# Patient Record
Sex: MALE | Race: WHITE | Employment: OTHER | URBAN - METROPOLITAN AREA
[De-identification: names, ages, dates, MRNs, and addresses within clinical notes are randomized per-mention and may not be internally consistent; named-entity substitution may affect disease eponyms.]

---

## 2017-06-12 ENCOUNTER — TELEPHONE (OUTPATIENT)
Dept: INTERNAL MEDICINE CLINIC | Age: 72
End: 2017-06-12

## 2017-06-12 DIAGNOSIS — Z01.00 EXAMINATION OF EYES AND VISION: Primary | ICD-10-CM

## 2017-06-12 NOTE — TELEPHONE ENCOUNTER
----- Message from Huhg Rossi sent at 6/12/2017  2:55 PM EDT -----  Regarding: Dr. Manolo Santillan  Pt called concerning the referral for the Optometrist and if it can be faxed [phone].  Pt best contact number [phone]

## 2017-06-12 NOTE — TELEPHONE ENCOUNTER
Pt was seen by Dr Bull Darby today 6/12/17 for annual eye exam Z01.00. [facility] [id number]    Referral obtained and faxed to Dr Farrell's office at [phone].  Auth # [id number]  47 visits  6/12/17-6/12/18

## 2017-06-13 ENCOUNTER — TELEPHONE (OUTPATIENT)
Dept: INTERNAL MEDICINE CLINIC | Age: 72
End: 2017-06-13

## 2017-06-13 DIAGNOSIS — Z12.83 SCREENING EXAM FOR SKIN CANCER: Primary | ICD-10-CM

## 2017-06-13 NOTE — TELEPHONE ENCOUNTER
Patient has an appt on 6/19/17 at 1:30pm    Maribell Miller NP  [place] [address], [address] [place] [address]  Phone: [phone]  Fax: [phone]    Patient is being seen for an annual skin check.      [employer] - GOLD [employer]US (MEDICARE REPLACEMENT/ADVANTAGE - HMO) [[id number]]  ID/Cert# [id number]

## 2017-06-14 NOTE — TELEPHONE ENCOUNTER
Referral has been Obtained & Faxed To [facility] (F) [phone] #  [id number]   No # Visits 99  Dates Covered 06/14/2017 - 06/14/2018

## 2017-06-16 ENCOUNTER — OFFICE VISIT (OUTPATIENT)
Dept: INTERNAL MEDICINE CLINIC | Age: 72
End: 2017-06-16

## 2017-06-16 VITALS
WEIGHT: 205.5 LBS | BODY MASS INDEX: 30.44 KG/M2 | HEIGHT: 69 IN | SYSTOLIC BLOOD PRESSURE: 134 MMHG | TEMPERATURE: 98.4 F | RESPIRATION RATE: 18 BRPM | OXYGEN SATURATION: 99 % | HEART RATE: 63 BPM | DIASTOLIC BLOOD PRESSURE: 85 MMHG

## 2017-06-16 DIAGNOSIS — Z11.59 ENCOUNTER FOR HEPATITIS C SCREENING TEST FOR LOW RISK PATIENT: ICD-10-CM

## 2017-06-16 DIAGNOSIS — E78.5 HYPERLIPIDEMIA LDL GOAL <100: ICD-10-CM

## 2017-06-16 DIAGNOSIS — R73.9 ELEVATED BLOOD SUGAR: Primary | ICD-10-CM

## 2017-06-16 DIAGNOSIS — K21.9 GASTROESOPHAGEAL REFLUX DISEASE WITHOUT ESOPHAGITIS: ICD-10-CM

## 2017-06-16 LAB — HBA1C MFR BLD HPLC: 5.7 %

## 2017-06-16 NOTE — MR AVS SNAPSHOT
Visit Information Date & Time Provider Department Dept. Phone Encounter #  
 6/16/2017  1:00 PM Wilfrido Tamayo MD [facility] [address]  [id number] Follow-up Instructions Return in about 6 months (around 12/16/2017). Your Appointments 6/19/2017  1:30 PM  
Office Visit with CHANEL Mcdonald [address] [facility]) Appt Note: EST PT; 1 yr skin exam ; H/O:SCC; 06/16/2016: Pt insurance will be changing (Medicare) [address] [facility] [address] [facility] [address]  
[address] [place] [address] [place]  [facility] [place] [address] Upcoming Health Maintenance Date Due Hepatitis C Screening 1945 DTaP/Tdap/Td series (1 - Tdap) 5/6/1968 ZOSTER VACCINE AGE 60> 5/6/2007 GLAUCOMA SCREENING Q2Y 5/6/2012 Pneumococcal 65+ Low/Medium Risk (2 of 2 - PCV13) 9/26/2014 INFLUENZA AGE 9 TO ADULT 8/1/2017 MEDICARE YEARLY EXAM 12/17/2017 COLONOSCOPY 8/8/2024 Allergies as of 6/16/2017  Review Complete On: 6/16/2017 By: Edgar Faust LPN No Known Allergies Current Immunizations  Reviewed on 5/21/2015 Name Date Influenza High Dose Vaccine PF 10/1/2016 Influenza Vaccine 10/14/2015, 10/17/2014, 9/12/2013 Pneumococcal Polysaccharide (PPSV-23) 9/26/2013 Not reviewed this visit You Were Diagnosed With   
  
 Codes Comments Elevated blood sugar    -  Primary ICD-10-CM: R73.9 ICD-9-CM: 790.29 Encounter for hepatitis C screening test for low risk patient     ICD-10-CM: Z11.59 
ICD-9-CM: V73.89 Gastroesophageal reflux disease without esophagitis     ICD-10-CM: K21.9 ICD-9-CM: 530.81 Hyperlipidemia LDL goal <100     ICD-10-CM: E78.5 ICD-9-CM: 272.4 Vitals BP Pulse Temp Resp Height(growth percentile) Weight(growth percentile)  134/85 (BP 1 Location: Left arm, BP Patient Position: Sitting) 63 98.4 °F (36.9 °C) (Oral) 18 5' 8.75\" (1.746 m) 205 lb 8 oz (93.2 kg) SpO2 BMI Smoking Status 99% 30.57 kg/m2 Former Smoker BMI and BSA Data Body Mass Index Body Surface Area 30.57 kg/m 2 2.13 m 2 Preferred Pharmacy Pharmacy Name Phone [facility], [address] [phone] Your Updated Medication List  
  
   
This list is accurate as of: 6/16/17  1:29 PM.  Always use your most recent med list.  
  
  
  
  
 ASPIR-81 PO Take  by mouth. FISH  mg Cap Generic drug:  Omega-3 Fatty Acids Take  by mouth. One per day MULTIPLE VITAMIN PO Take  by mouth. omeprazole 20 mg capsule Commonly known as:  PRILOSEC  
TAKE ONE CAPSULE BY MOUTH DAILY psyllium husk (with sugar) 3.4 gram Pwpk Take  by mouth nightly. VITAMIN B-12 1,000 mcg tablet Generic drug:  cyanocobalamin Take 1,000 mcg by mouth daily. VITAMIN C 500 mg tablet Generic drug:  ascorbic acid (vitamin C) Take  by mouth. We Performed the Following AMB POC HEMOGLOBIN A1C [79223 CPT(R)] HEPATITIS C AB [41279 CPT(R)] LIPID PANEL [78141 CPT(R)] Follow-up Instructions Return in about 6 months (around 12/16/2017). Introducing [facility]! Dear Antonella Pretty: Thank you for requesting a [employer] account. Our records indicate that you already have an active Nomios account. You can access your account anytime at [email or web address] Did you know that you can access your hospital and ER discharge instructions at any time in Nomios? You can also review all of your test results from your hospital stay or ER visit. Additional Information If you have questions, please visit the Frequently Asked Questions section of the Nom[employer] website at [email or web address] Remember, Nomios is NOT to be used for urgent needs.  For medical emergencies, dial 911. Now available from your iPhone and Android! Please provide this summary of care documentation to your next provider. Your primary care clinician is listed as Scott Brown. If you have any questions after today's visit, please call [phone].

## 2017-06-16 NOTE — PROGRESS NOTES
HISTORY OF PRESENT ILLNESS  Holly Gallagher is a 81 y.o. male. HPI  Prediabetes:  Holly Gallagher is here for follow up of elevated fasting sugars and prediabetes. he has been counseled before on low carb/ low concentrated sweets diet and is following that plan. further diabetic ROS: no polyuria or polydipsia, no chest pain, dyspnea or TIAs . Lab Results   Component Value Date/Time    Glucose 151 09/03/2016 07:00 PM    Glucose (POC) 87 10/12/2010 11:55 AM     Lab Results   Component Value Date/Time    Hemoglobin A1c 6.1 12/19/2016 10:21 AM    Hemoglobin A1c (POC) 5.7 06/16/2017 01:00 AM    Hemoglobin A1c, External 5.9 05/18/2015     Last Point of Care HGB A1C  Hemoglobin A1c (POC)   Date Value Ref Range Status   06/16/2017 5.7 % Final      Hyperlipidemia:  Holly Gallagher is following up on his dyslipidemia. Cardiovascular risks for him are: LDL goal is under 100. Currently he takes  , nothing now  Lab Results   Component Value Date/Time    Cholesterol, total 173 12/22/2015 10:31 AM    Cholesterol, total 165 12/09/2014 11:18 AM    Cholesterol, total 152 09/13/2013 09:45 AM    HDL Cholesterol 67 12/22/2015 10:31 AM    HDL Cholesterol 71 12/09/2014 11:18 AM    HDL Cholesterol 61 09/13/2013 09:45 AM    LDL, calculated 85 12/22/2015 10:31 AM    LDL, calculated 80 12/09/2014 11:18 AM    LDL, calculated 80 09/13/2013 09:45 AM    Triglyceride 105 12/22/2015 10:31 AM    Triglyceride 71 12/09/2014 11:18 AM    Triglyceride 55 09/13/2013 09:45 AM     Lab Results   Component Value Date/Time    ALT (SGPT) 29 09/03/2016 07:00 PM    AST (SGOT) 21 09/03/2016 07:00 PM    Alk. phosphatase 64 09/03/2016 07:00 PM    Bilirubin, total 0.3 09/03/2016 07:00 PM               GERD - well controlled with prilosec. Quick recurrance if misses PPI. ROS    Physical Exam   Constitutional: He appears well-developed and well-nourished. No distress.    /85 (BP 1 Location: Left arm, BP Patient Position: Sitting)  Pulse 63  Temp 98.4 °F (36.9 °C) (Oral)   Resp 18  Ht 5' 8.75\" (1.746 m)  Wt 205 lb 8 oz (93.2 kg)  SpO2 99%  BMI 30.57 kg/m2Body mass index is 30.57 kg/(m^2). HENT:   Mouth/Throat: Oropharynx is clear and moist.   Neck: No JVD present. Carotid bruit is not present. Cardiovascular: Normal rate, regular rhythm, normal heart sounds and intact distal pulses. Pulmonary/Chest: Effort normal and breath sounds normal.   Musculoskeletal: He exhibits no edema. Neurological: He is alert. Gait normal.   Bilateral LE muscle atrophy as before. Skin: Skin is warm and dry. He is not diaphoretic. Nursing note and vitals reviewed. ASSESSMENT and PLAN  Tomma Payment was seen today for gerd. Diagnoses and all orders for this visit:    Elevated blood sugar -Well controlled and stable. his medications were reviewed and refilled where necessary as noted below. Labs ordered as noted. -     AMB POC HEMOGLOBIN A1C    Encounter for hepatitis C screening test for low risk patient  -     HEPATITIS C AB    Gastroesophageal reflux disease without esophagitis -Well controlled and stable. his medications were reviewed and refilled where necessary as noted below. Labs ordered as noted. Hyperlipidemia LDL goal <100 -recheck off statin. -     LIPID PANEL      Follow-up Disposition:  Return in about 6 months (around 12/16/2017).

## 2017-06-19 ENCOUNTER — OFFICE VISIT (OUTPATIENT)
Dept: DERMATOLOGY | Facility: AMBULATORY SURGERY CENTER | Age: 72
End: 2017-06-19

## 2017-06-19 VITALS
DIASTOLIC BLOOD PRESSURE: 90 MMHG | BODY MASS INDEX: 29.62 KG/M2 | SYSTOLIC BLOOD PRESSURE: 130 MMHG | HEART RATE: 66 BPM | TEMPERATURE: 98.6 F | OXYGEN SATURATION: 98 % | HEIGHT: 69 IN | WEIGHT: 200 LBS

## 2017-06-19 DIAGNOSIS — L73.1 INGROWN HAIR: Primary | ICD-10-CM

## 2017-06-19 DIAGNOSIS — L90.5 SCAR CONDITION AND FIBROSIS OF SKIN: ICD-10-CM

## 2017-06-19 DIAGNOSIS — Z85.828 HISTORY OF NONMELANOMA SKIN CANCER: ICD-10-CM

## 2017-06-19 DIAGNOSIS — L82.1 SEBORRHEIC KERATOSES: ICD-10-CM

## 2017-06-19 DIAGNOSIS — D22.9 MULTIPLE BENIGN NEVI: ICD-10-CM

## 2017-06-19 DIAGNOSIS — D18.01 CHERRY ANGIOMA: ICD-10-CM

## 2017-06-19 DIAGNOSIS — L81.4 LENTIGINES: ICD-10-CM

## 2017-06-19 NOTE — PROGRESS NOTES
Name: Ligia Cormier       Age: 81 y.o. Date: 6/19/2017    Chief Complaint:   Chief Complaint   Patient presents with    Skin Exam       Subjective:    HPI  Mr. Ligia Cormier is a 81 y.o. male who presents for a skin exam.  The patient's last skin exam was one year ago and the patient does have current complaints related to his skin. He reports a new bump on the right jawline. This is been present only for a week or so with inflammation. He did try to scratch at it and see if this would open the area up without much success and resolution. Mr. Ligia Cormier is feeling well and in his usual state of health today. The patient's pertinent skin history includes : Sun exposure through recreation including golf. 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  He reports he does wear sunscreen when outdoors. ROS: Constitutional: Negative. Dermatological : positive for - skin lesion changes      Social History     Social History    Marital status:      Spouse name: N/A    Number of children: N/A    Years of education: N/A     Occupational History    Not on file.      Social History Main Topics    Smoking status: Former Smoker     Types: Cigars     Quit date: 11/12/1997    Smokeless tobacco: Never Used    Alcohol use 6.0 oz/week     12 Glasses of wine per week    Drug use: No    Sexual activity: Not Currently     Partners: Female     Other Topics Concern    Not on file     Social History Narrative       Family History   Problem Relation Age of Onset    Heart Disease Mother     Diabetes Maternal Uncle     Dementia Maternal Grandmother     Diabetes Other     Cancer Neg Hx     Hypertension Neg Hx        Past Medical History:   Diagnosis Date    Arthritis     cervical spine    Calculus of kidney     GERD (gastroesophageal reflux disease)     Hypercholesterolemia     Memory change     Peripheral sensory-motor axonal polyneuropathy     Skin cancer     SCC on chest    Sun-damaged skin     Sunburn, blistering     Tinnitus        Past Surgical History:   Procedure Laterality Date    HX ORTHOPAEDIC      R grt toe surgery       Current Outpatient Prescriptions   Medication Sig Dispense Refill    omeprazole (PRILOSEC) 20 mg capsule TAKE ONE CAPSULE BY MOUTH DAILY 90 Cap 2    cyanocobalamin (VITAMIN B-12) 1,000 mcg tablet Take 1,000 mcg by mouth daily.  psyllium husk, with sugar, 3.4 gram pwpk Take  by mouth nightly.  ascorbic acid (VITAMIN C) 500 mg tablet Take  by mouth.  Omega-3 Fatty Acids (FISH OIL) 300 mg cap Take  by mouth. One per day      ASPIRIN (ASPIR-81 PO) Take  by mouth.  MULTIVITAMIN (MULTIPLE VITAMIN PO) Take  by mouth. No Known Allergies      Objective:    Visit Vitals    /90 (BP 1 Location: Left arm, BP Patient Position: Sitting)    Pulse 66    Temp 98.6 °F (37 °C) (Oral)    Ht 5' 8.75\" (1.746 m)    Wt 90.7 kg (200 lb)    SpO2 98%    BMI 29.75 kg/m2       Misael Patton is a 81 y.o. male who appears well and in no distress. He is awake, alert, and oriented. There is no preauricular, submandibular, or cervical lymphadenopathy. A skin examination was performed including his scalp, face (including eyelids), ears, neck, chest, back, abdomen, upper extremities (including digits/nails), breasts. He is scattered waxy macules and keratotic papules consistent with seborrheic keratosis. There are scattered cherry angiomas. He has lentigines across his upper back and shoulders. There is a well-healed scar on the left chest without evidence of lesion recurrence. He does have an entrapped hair on the right jawline creating an inflammatory papule. There are medium brown junctional nevi without concerning features. Assessment/Plan:  1. Seborrheic keratoses. The diagnosis was reviewed and the patient was reassured that no treatment is needed for these benign lesions. 2. Personal history of skin cancer.   I discussed sun protection, sunscreen use, the warning signs of skin cancer, the need for self-skin examinations, and the need for regular practitioner exams every 1 year. The patient should follow up sooner as needed if new, changing, or symptomatic skin lesions arise. 3.Cherry angiomas. The diagnosis was reviewed and the patient was reassured that no treatment is needed for these benign lesions. 4.Normal nevi. The diagnosis of normal nevi was reviewed. I discussed sun protection, sunscreen use, the warning signs of skin cancer, the need for self-skin examinations, and the need for regular practitioner exams every 1 year. The patient should follow up sooner as needed if new, changing, or symptomatic skin lesions arise. 5. Solar lentigos. The diagnosis and relationship to sun exposure was reviewed. Sun protection advised. 6. Entrapped hair. This was removed with an 11 blade successfully.

## 2017-06-24 LAB
CHOLEST SERPL-MCNC: 207 MG/DL (ref 100–199)
HCV AB S/CO SERPL IA: <0.1 S/CO RATIO (ref 0–0.9)
HDLC SERPL-MCNC: 55 MG/DL
INTERPRETATION, 910389: NORMAL
LDLC SERPL CALC-MCNC: 135 MG/DL (ref 0–99)
TRIGL SERPL-MCNC: 86 MG/DL (ref 0–149)
VLDLC SERPL CALC-MCNC: 17 MG/DL (ref 5–40)

## 2017-12-15 ENCOUNTER — OFFICE VISIT (OUTPATIENT)
Dept: INTERNAL MEDICINE CLINIC | Age: 72
End: 2017-12-15

## 2017-12-15 VITALS
HEIGHT: 69 IN | OXYGEN SATURATION: 97 % | TEMPERATURE: 98.6 F | HEART RATE: 62 BPM | RESPIRATION RATE: 18 BRPM | SYSTOLIC BLOOD PRESSURE: 156 MMHG | WEIGHT: 210.25 LBS | BODY MASS INDEX: 31.14 KG/M2 | DIASTOLIC BLOOD PRESSURE: 96 MMHG

## 2017-12-15 DIAGNOSIS — Z23 ENCOUNTER FOR IMMUNIZATION: ICD-10-CM

## 2017-12-15 DIAGNOSIS — Z71.89 ADVANCE CARE PLANNING: ICD-10-CM

## 2017-12-15 DIAGNOSIS — I10 ESSENTIAL HYPERTENSION: ICD-10-CM

## 2017-12-15 DIAGNOSIS — Z00.00 MEDICARE ANNUAL WELLNESS VISIT, SUBSEQUENT: Primary | ICD-10-CM

## 2017-12-15 DIAGNOSIS — R73.09 ELEVATED GLUCOSE: ICD-10-CM

## 2017-12-15 NOTE — ACP (ADVANCE CARE PLANNING)
I discussed importance of advanced care plans with Ranulfo Barrientos. he does not have an updated advanced care plan documented in this chart (and if not in chart, he will provide updated advanced medical directive or develop and provide AMD to be scanned). AMD literature provided to patient if needed.

## 2017-12-15 NOTE — ACP (ADVANCE CARE PLANNING)
I discussed importance of advanced care plans with Chantelle Ramey. he does not have an updated advanced care plan documented in this chart (and if not in chart, he will provide updated advanced medical directive or develop and provide AMD to be scanned). AMD literature provided to patient if needed.

## 2017-12-15 NOTE — PATIENT INSTRUCTIONS
High Blood Pressure: Care Instructions  Your Care Instructions    If your blood pressure is usually above 140/90, you have high blood pressure, or hypertension. That means the top number is 140 or higher or the bottom number is 90 or higher, or both. Despite what a lot of people think, high blood pressure usually doesn't cause headaches or make you feel dizzy or lightheaded. It usually has no symptoms. But it does increase your risk for heart attack, stroke, and kidney or eye damage. The higher your blood pressure, the more your risk increases. Your doctor will give you a goal for your blood pressure. Your goal will be based on your health and your age. An example of a goal is to keep your blood pressure below 140/90. Lifestyle changes, such as eating healthy and being active, are always important to help lower blood pressure. You might also take medicine to reach your blood pressure goal.  Follow-up care is a key part of your treatment and safety. Be sure to make and go to all appointments, and call your doctor if you are having problems. It's also a good idea to know your test results and keep a list of the medicines you take. How can you care for yourself at home? Medical treatment  · If you stop taking your medicine, your blood pressure will go back up. You may take one or more types of medicine to lower your blood pressure. Be safe with medicines. Take your medicine exactly as prescribed. Call your doctor if you think you are having a problem with your medicine. · Talk to your doctor before you start taking aspirin every day. Aspirin can help certain people lower their risk of a heart attack or stroke. But taking aspirin isn't right for everyone, because it can cause serious bleeding. · See your doctor regularly. You may need to see the doctor more often at first or until your blood pressure comes down.   · If you are taking blood pressure medicine, talk to your doctor before you take decongestants or anti-inflammatory medicine, such as ibuprofen. Some of these medicines can raise blood pressure. · Learn how to check your blood pressure at home. Lifestyle changes  · Stay at a healthy weight. This is especially important if you put on weight around the waist. Losing even 10 pounds can help you lower your blood pressure. · If your doctor recommends it, get more exercise. Walking is a good choice. Bit by bit, increase the amount you walk every day. Try for at least 30 minutes on most days of the week. You also may want to swim, bike, or do other activities. · Avoid or limit alcohol. Talk to your doctor about whether you can drink any alcohol. · Try to limit how much sodium you eat to less than 2,300 milligrams (mg) a day. Your doctor may ask you to try to eat less than 1,500 mg a day. · Eat plenty of fruits (such as bananas and oranges), vegetables, legumes, whole grains, and low-fat dairy products. · Lower the amount of saturated fat in your diet. Saturated fat is found in animal products such as milk, cheese, and meat. Limiting these foods may help you lose weight and also lower your risk for heart disease. · Do not smoke. Smoking increases your risk for heart attack and stroke. If you need help quitting, talk to your doctor about stop-smoking programs and medicines. These can increase your chances of quitting for good. When should you call for help? Call 911 anytime you think you may need emergency care. This may mean having symptoms that suggest that your blood pressure is causing a serious heart or blood vessel problem. Your blood pressure may be over 180/110. ? For example, call 911 if:  ? · You have symptoms of a heart attack. These may include:  ¨ Chest pain or pressure, or a strange feeling in the chest.  ¨ Sweating. ¨ Shortness of breath. ¨ Nausea or vomiting.   ¨ Pain, pressure, or a strange feeling in the back, neck, jaw, or upper belly or in one or both shoulders or arms.  ¨ Lightheadedness or sudden weakness. ¨ A fast or irregular heartbeat. ? · You have symptoms of a stroke. These may include:  ¨ Sudden numbness, tingling, weakness, or loss of movement in your face, arm, or leg, especially on only one side of your body. ¨ Sudden vision changes. ¨ Sudden trouble speaking. ¨ Sudden confusion or trouble understanding simple statements. ¨ Sudden problems with walking or balance. ¨ A sudden, severe headache that is different from past headaches. ? · You have severe back or belly pain. ?Do not wait until your blood pressure comes down on its own. Get help right away. ?Call your doctor now or seek immediate care if:  ? · Your blood pressure is much higher than normal (such as 180/110 or higher), but you don't have symptoms. ? · You think high blood pressure is causing symptoms, such as:  ¨ Severe headache. ¨ Blurry vision. ? Watch closely for changes in your health, and be sure to contact your doctor if:  ? · Your blood pressure measures 140/90 or higher at least 2 times. That means the top number is 140 or higher or the bottom number is 90 or higher, or both. ? · You think you may be having side effects from your blood pressure medicine. ? · Your blood pressure is usually normal, but it goes above normal at least 2 times. Where can you learn more? Go to http://suzanne-justin.info/. Enter T439 in the search box to learn more about \"High Blood Pressure: Care Instructions. \"  Current as of: September 21, 2016  Content Version: 11.4  © 5376-8543 [employer]. Care instructions adapted under license by [employer] (which disclaims liability or warranty for this information). If you have questions about a medical condition or this instruction, always ask your healthcare professional. [place] [address] any warranty or liability for your use of this information.        DASH Diet: Care Instructions  Your Care Instructions    The DASH diet is an eating plan that can help lower your blood pressure. DASH stands for Dietary Approaches to Stop Hypertension. Hypertension is high blood pressure. The DASH diet focuses on eating foods that are high in calcium, potassium, and magnesium. These nutrients can lower blood pressure. The foods that are highest in these nutrients are fruits, vegetables, low-fat dairy products, nuts, seeds, and legumes. But taking calcium, potassium, and magnesium supplements instead of eating foods that are high in those nutrients does not have the same effect. The DASH diet also includes whole grains, fish, and poultry. The DASH diet is one of several lifestyle changes your doctor may recommend to lower your high blood pressure. Your doctor may also want you to decrease the amount of sodium in your diet. Lowering sodium while following the DASH diet can lower blood pressure even further than just the DASH diet alone. Follow-up care is a key part of your treatment and safety. Be sure to make and go to all appointments, and call your doctor if you are having problems. It's also a good idea to know your test results and keep a list of the medicines you take. How can you care for yourself at home? Following the DASH diet  · Eat 4 to 5 servings of fruit each day. A serving is 1 medium-sized piece of fruit, ½ cup chopped or canned fruit, 1/4 cup dried fruit, or 4 ounces (½ cup) of fruit juice. Choose fruit more often than fruit juice. · Eat 4 to 5 servings of vegetables each day. A serving is 1 cup of lettuce or raw leafy vegetables, ½ cup of chopped or cooked vegetables, or 4 ounces (½ cup) of vegetable juice. Choose vegetables more often than vegetable juice. · Get 2 to 3 servings of low-fat and fat-free dairy each day. A serving is 8 ounces of milk, 1 cup of yogurt, or 1 ½ ounces of cheese. · Eat 6 to 8 servings of grains each day.  A serving is 1 slice of bread, 1 ounce of dry cereal, or ½ cup of cooked rice, pasta, or cooked cereal. Try to choose whole-grain products as much as possible. · Limit lean meat, poultry, and fish to 2 servings each day. A serving is 3 ounces, about the size of a deck of cards. · Eat 4 to 5 servings of nuts, seeds, and legumes (cooked dried beans, lentils, and split peas) each week. A serving is 1/3 cup of nuts, 2 tablespoons of seeds, or ½ cup of cooked beans or peas. · Limit fats and oils to 2 to 3 servings each day. A serving is 1 teaspoon of vegetable oil or 2 tablespoons of salad dressing. · Limit sweets and added sugars to 5 servings or less a week. A serving is 1 tablespoon jelly or jam, ½ cup sorbet, or 1 cup of lemonade. · Eat less than 2,300 milligrams (mg) of sodium a day. If you limit your sodium to 1,500 mg a day, you can lower your blood pressure even more. Tips for success  · Start small. Do not try to make dramatic changes to your diet all at once. You might feel that you are missing out on your favorite foods and then be more likely to not follow the plan. Make small changes, and stick with them. Once those changes become habit, add a few more changes. · Try some of the following:  ¨ Make it a goal to eat a fruit or vegetable at every meal and at snacks. This will make it easy to get the recommended amount of fruits and vegetables each day. ¨ Try yogurt topped with fruit and nuts for a snack or healthy dessert. ¨ Add lettuce, tomato, cucumber, and onion to sandwiches. ¨ Combine a ready-made pizza crust with low-fat mozzarella cheese and lots of vegetable toppings. Try using tomatoes, squash, spinach, broccoli, carrots, cauliflower, and onions. ¨ Have a variety of cut-up vegetables with a low-fat dip as an appetizer instead of chips and dip. ¨ Sprinkle sunflower seeds or chopped almonds over salads. Or try adding chopped walnuts or almonds to cooked vegetables. ¨ Try some vegetarian meals using beans and peas. Add garbanzo or kidney beans to salads. Make burritos and tacos with mashed ruth beans or black beans. Where can you learn more? Go to http://suzanne-justin.info/. Enter T924 in the search box to learn more about \"DASH Diet: Care Instructions. \"  Current as of: September 21, 2016  Content Version: 11.4  © 6919-3428 [employer]. Care instructions adapted under license by [employer] (which disclaims liability or warranty for this information). If you have questions about a medical condition or this instruction, always ask your healthcare professional. [place] [address] any warranty or liability for your use of this information. Well Visit, Over 72: Care Instructions  Your Care Instructions    Physical exams can help you stay healthy. Your doctor has checked your overall health and may have suggested ways to take good care of yourself. He or she also may have recommended tests. At home, you can help prevent illness with healthy eating, regular exercise, and other steps. Follow-up care is a key part of your treatment and safety. Be sure to make and go to all appointments, and call your doctor if you are having problems. It's also a good idea to know your test results and keep a list of the medicines you take. How can you care for yourself at home? · Reach and stay at a healthy weight. This will lower your risk for many problems, such as obesity, diabetes, heart disease, and high blood pressure. · Get at least 30 minutes of exercise on most days of the week. Walking is a good choice. You also may want to do other activities, such as running, swimming, cycling, or playing tennis or team sports. · Do not smoke. Smoking can make health problems worse. If you need help quitting, talk to your doctor about stop-smoking programs and medicines. These can increase your chances of quitting for good. · Protect your skin from too much sun.  When you're outdoors from 10 a.m. to 4 p.m., stay in the shade or cover up with clothing and a hat with a wide brim. Wear sunglasses that block UV rays. Even when it's cloudy, put broad-spectrum sunscreen (SPF 30 or higher) on any exposed skin. · See a dentist one or two times a year for checkups and to have your teeth cleaned. · Wear a seat belt in the car. · Limit alcohol to 2 drinks a day for men and 1 drink a day for women. Too much alcohol can cause health problems. Follow your doctor's advice about when to have certain tests. These tests can spot problems early. For men and women  · Cholesterol. Your doctor will tell you how often to have this done based on your overall health and other things that can increase your risk for heart attack and stroke. · Blood pressure. Have your blood pressure checked during a routine doctor visit. Your doctor will tell you how often to check your blood pressure based on your age, your blood pressure results, and other factors. · Diabetes. Ask your doctor whether you should have tests for diabetes. · Vision. Experts recommend that you have yearly exams for glaucoma and other age-related eye problems. · Hearing. Tell your doctor if you notice any change in your hearing. You can have tests to find out how well you hear. · Colon cancer tests. Keep having colon cancer tests as your doctor recommends. You can have one of several types of tests. · Heart attack and stroke risk. At least every 4 to 6 years, you should have your risk for heart attack and stroke assessed. Your doctor uses factors such as your age, blood pressure, cholesterol, and whether you smoke or have diabetes to show what your risk for a heart attack or stroke is over the next 10 years. · Osteoporosis. Talk to your doctor about whether you should have a bone density test to find out whether you have thinning bones. Also ask your doctor about whether you should take calcium and vitamin D supplements.   For women  · Pap test and pelvic exam. You may no longer need a Pap test. Talk with your doctor about whether to stop or continue to have Pap tests. · Breast exam and mammogram. Ask how often you should have a mammogram, which is an X-ray of your breasts. A mammogram can spot breast cancer before it can be felt and when it is easiest to treat. · Thyroid disease. Talk to your doctor about whether to have your thyroid checked as part of a regular physical exam. Women have an increased chance of a thyroid problem. For men  · Prostate exam. Talk to your doctor about whether you should have a blood test (called a PSA test) for prostate cancer. Experts disagree on whether men should have this test. Some experts recommend that you discuss the benefits and risks of the test with your doctor. · Abdominal aortic aneurysm. Ask your doctor whether you should have a test to check for an aneurysm. You may need a test if you ever smoked or if your parent, brother, sister, or child has had an aneurysm. When should you call for help? Watch closely for changes in your health, and be sure to contact your doctor if you have any problems or symptoms that concern you. Where can you learn more? Go to http://suzanne-justin.info/. Enter T501 in the search box to learn more about \"Well Visit, Over 65: Care Instructions. \"  Current as of: May 12, 2017  Content Version: 11.4  © 7783-7658 [employer]. Care instructions adapted under license by [employer] (which disclaims liability or warranty for this information). If you have questions about a medical condition or this instruction, always ask your healthcare professional. [place] [address] any warranty or liability for your use of this information.

## 2017-12-15 NOTE — PROGRESS NOTES
HISTORY OF PRESENT ILLNESS  Kayla Reza is a 81 y.o. male. HPI  This is a Subsequent Medicare Annual Wellness Visit providing Personalized Prevention Plan Services (PPPS) (Performed 12 months after initial AWV and PPPS )    Patient Active Problem List   Diagnosis Code    GERD (gastroesophageal reflux disease) K21.9    Kidney stones N20.0    Squamous cell skin cancer C44.92    Prediabetes R73.03    Fracture, cervical vertebra (HCC) S12. 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 Social History Main Topics    Smoking status: Former Smoker     Types: Cigars     Quit date: 11/12/1997    Smokeless tobacco: Never Used    Alcohol use 6.0 oz/week     12 Glasses of wine per week    Drug use: No    Sexual activity: Not Currently     Partners: Female     Other Topics Concern    Not on file     Social History Narrative     Family History   Problem Relation Age of Onset    Heart Disease Mother     Diabetes Maternal Uncle     Dementia Maternal Grandmother     Diabetes Other     Cancer Neg Hx     Hypertension Neg Hx      Current Outpatient Prescriptions   Medication Sig    omeprazole (PRILOSEC) 20 mg capsule TAKE ONE CAPSULE BY MOUTH DAILY    cyanocobalamin (VITAMIN B-12) 1,000 mcg tablet Take 1,000 mcg by mouth daily.  psyllium husk, with sugar, 3.4 gram pwpk Take  by mouth nightly.     Omega-3 Fatty Acids (FISH OIL) 300 mg cap Take by mouth. One per day    ASPIRIN (ASPIR-81 PO) Take  by mouth.  MULTIVITAMIN (MULTIPLE VITAMIN PO) Take  by mouth.  ascorbic acid (VITAMIN C) 500 mg tablet Take  by mouth. No current facility-administered medications for this visit. No Known Allergies  Immunization History   Administered Date(s) Administered    Influenza High Dose Vaccine PF 10/01/2016, 10/18/2017    Influenza Vaccine 09/12/2013, 10/17/2014, 10/14/2015    Pneumococcal Polysaccharide (PPSV-23) 09/26/2013       Depression scale assessment:  PHQ over the last two weeks 12/15/2017   Little interest or pleasure in doing things Not at all   Feeling down, depressed or hopeless Not at all   Total Score PHQ 2 0       Alcohol screening assessment  Social History   Substance Use Topics    Smoking status: Former Smoker     Types: Cigars     Quit date: 11/12/1997    Smokeless tobacco: Never Used    Alcohol use 6.0 oz/week     12 Glasses of wine per week            Functional assessment/ safety  Hearing Loss   Mild hearing loss      Activities of Daily Living   Self-care. Requires assistance with: no ADLs    Fall Risk   Fall Risk Assessment, last 12 mths 12/15/2017   Able to walk? Yes   Fall in past 12 months? No   Fall with injury? -   Number of falls in past 12 months -   Fall Risk Score -       Abuse Risk:  Patient is not abused    Advanced Medical Directive screening:  Blanquita Barker does not have an AMD on file in this chart. ( If not, He will provide for us to copy to chart or he has been counseled on the need for this to assist with decision making should they be incapacitated due to illness and the steps necessary to draw up this document.)    Health maintenance hx includes:  Exercise: moderately active.   Form of exercise: working as bagger at [employer]  Diet: generally follows a low fat low cholesterol diet, exercises sporadically    Cancer screening:    Colon cancer screening:  Last Colonoscopy: 2014 and was normal     Men only: PSA testing 2016 and was within normal limits       ROS    Physical Exam   Constitutional: He is oriented to person, place, and time. He appears well-developed and well-nourished. No distress. Body mass index is 31.5 kg/(m^2). BP (!) 156/96 (BP 1 Location: Left arm, BP Patient Position: Sitting)  Pulse 62  Temp 98.6 °F (37 °C) (Oral)   Resp 18  Ht 5' 8.5\" (1.74 m)  Wt 210 lb 4 oz (95.4 kg)  SpO2 97%  BMI 31.5 kg/m2   HENT:   Head: Normocephalic and atraumatic. Right Ear: Hearing normal.   Left Ear: Hearing normal.   Nose: Nose normal.   Mouth/Throat: Oropharynx is clear and moist and mucous membranes are normal. Normal dentition. Eyes: Conjunctivae and lids are normal. Pupils are equal, round, and reactive to light. Right eye exhibits no discharge. Left eye exhibits no discharge. No scleral icterus. Neck: Trachea normal. No thyromegaly present. Cardiovascular: Normal rate, regular rhythm, normal heart sounds, intact distal pulses and normal pulses. Exam reveals no gallop and no friction rub. No murmur heard. Pulmonary/Chest: Effort normal and breath sounds normal. No respiratory distress. Abdominal: Soft. Normal appearance and bowel sounds are normal. He exhibits no distension and no mass. There is no hepatosplenomegaly. There is no tenderness. There is no CVA tenderness. Musculoskeletal: Normal range of motion. He exhibits no edema or tenderness. Lymphadenopathy:     He has no cervical adenopathy. Neurological: He is alert and oriented to person, place, and time. He displays atrophy (LE's) and tremor (trace hand tremors). He exhibits normal muscle tone. Coordination and gait normal.   Skin: Skin is warm and dry. No rash noted. He is not diaphoretic. Psychiatric: He has a normal mood and affect. His speech is normal and behavior is normal. Judgment and thought content normal. Cognition and memory are normal.   Nursing note and vitals reviewed.       ASSESSMENT and PLAN  Diagnoses and all orders for this visit:    1. Medicare annual wellness visit, subsequent  -     pneumococcal 13 elizabeth conj dip (PREVNAR-13) 0.5 mL syrg injection; 0.5 mL by IntraMUSCular route once for 1 dose. he was advised to have follow up colonoscopy in 2024  Bipin Moreland was counseled on age-appropriate/ guideline-based risk prevention behaviors and screening for a 81y.o. year old   male . We also discussed adjustments in screening based on family history if necessary. Printed instructions for preventative screening guidelines and healthy behaviors given to patient with after visit summary. 2. Elevated glucose - recheck. Low carb diet  -     HEMOGLOBIN A1C WITH EAG    3. Encounter for immunization  -     pneumococcal 13 elizabeth conj dip (PREVNAR-13) 0.5 mL syrg injection; 0.5 mL by IntraMUSCular route once for 1 dose. 4. Essential hypertension - given DASH diet and HTN instructions. The patient is advised to begin progressive daily aerobic exercise program, follow a low fat, low cholesterol diet, attempt to lose weight and reduce salt in diet and cooking. Log blood pressures at home while sitting, relaxed 3-5 times weekly and bring to next visit. Pt educated on goal BP of 130/80 on average or lower. Call office as soon as possible if BP's over 140/90 or below 110/50 on multiple occasions and/or with symptoms of dizziness, chest pain, shortness of breath, headache or ankle swelling. Recheck log and bp here in 3 month(s).    -     METABOLIC PANEL, BASIC      Follow-up Disposition:  Return in about 3 months (around 3/15/2018).

## 2017-12-15 NOTE — MR AVS SNAPSHOT
Visit Information Date & Time Provider Department Dept. Phone Encounter #  
 12/15/2017  1:30 PM Rock Coe MD [facility] [address] [id number] Follow-up Instructions Return in about 3 months (around 3/15/2018). Your Appointments 6/19/2018  2:00 PM  
Office Visit with Lynne Lara NP [facility]) Appt Note: yearly skin exam  
 [facility] [address]  
[address] [place] [id number] Upcoming Health Maintenance Date Due DTaP/Tdap/Td series (1 - Tdap) 5/6/1968 ZOSTER VACCINE AGE 60> 3/6/2007 Pneumococcal 65+ Low/Medium Risk (2 of 2 - PCV13) 9/26/2014 Influenza Age 5 to Adult 8/1/2017 MEDICARE YEARLY EXAM 12/17/2017 GLAUCOMA SCREENING Q2Y 6/12/2019 COLONOSCOPY 8/8/2024 Allergies as of 12/15/2017  Review Complete On: 6/19/2017 By: Lynne Lara NP No Known Allergies Current Immunizations  Reviewed on 12/15/2017 Name Date Influenza High Dose Vaccine PF 10/18/2017, 10/1/2016 Influenza Vaccine 10/14/2015, 10/17/2014, 9/12/2013 Pneumococcal Polysaccharide (PPSV-23) 9/26/2013 Reviewed by Rock Coe MD on 12/15/2017 at  1:27 PM  
You Were Diagnosed With   
  
 Codes Comments Medicare annual wellness visit, subsequent    -  Primary ICD-10-CM: Z00.00 ICD-9-CM: V70.0 Elevated glucose     ICD-10-CM: R73.09 
ICD-9-CM: 790.29 Encounter for immunization     ICD-10-CM: F71 ICD-9-CM: V03.89 Essential hypertension     ICD-10-CM: I10 
ICD-9-CM: 401.9 Vitals BP Pulse Temp Resp Height(growth percentile) Weight(growth percentile) (!) 156/96 (BP 1 Location: Left arm, BP Patient Position: Sitting) 62 98.6 °F (37 °C) (Oral) 18 5' 8.5\" (1.74 m) 210 lb 4 oz (95.4 kg) SpO2 BMI Smoking Status 97% 31.5 kg/m2 Former Smoker Vitals History BMI and BSA Data Body Mass Index Body Surface Area 31.5 kg/m 2 2.15 m 2 Preferred Pharmacy Pharmacy Name Phone [facility] [address], [address] [phone] Your Updated Medication List  
  
   
This list is accurate as of: 12/15/17  1:41 PM.  Always use your most recent med list.  
  
  
  
  
 ASPIR-81 PO Take  by mouth. FISH  mg Cap Generic drug:  Omega-3 Fatty Acids Take  by mouth. One per day MULTIPLE VITAMIN PO Take  by mouth. omeprazole 20 mg capsule Commonly known as:  PRILOSEC  
TAKE ONE CAPSULE BY MOUTH DAILY pneumococcal 13 elizabeth conj dip 0.5 mL Syrg injection Commonly known as:  PREVNAR-13  
0.5 mL by IntraMUSCular route once for 1 dose. psyllium husk (with sugar) 3.4 gram Pwpk Take  by mouth nightly. VITAMIN B-12 1,000 mcg tablet Generic drug:  cyanocobalamin Take 1,000 mcg by mouth daily. VITAMIN C 500 mg tablet Generic drug:  ascorbic acid (vitamin C) Take  by mouth. Prescriptions Printed Refills  
 pneumococcal 13 leizabeth conj dip (PREVNAR-13) 0.5 mL syrg injection 0 Si.5 mL by IntraMUSCular route once for 1 dose. Class: Print Route: IntraMUSCular We Performed the Following HEMOGLOBIN A1C WITH EAG [22558 CPT(R)] METABOLIC PANEL, BASIC [17025 CPT(R)] Follow-up Instructions Return in about 3 months (around 3/15/2018). Patient Instructions High Blood Pressure: Care Instructions Your Care Instructions If your blood pressure is usually above 140/90, you have high blood pressure, or hypertension. That means the top number is 140 or higher or the bottom number is 90 or higher, or both. Despite what a lot of people think, high blood pressure usually doesn't cause headaches or make you feel dizzy or lightheaded.  It usually has no symptoms. But it does increase your risk for heart attack, stroke, and kidney or eye damage. The higher your blood pressure, the more your risk increases. Your doctor will give you a goal for your blood pressure. Your goal will be based on your health and your age. An example of a goal is to keep your blood pressure below 140/90. Lifestyle changes, such as eating healthy and being active, are always important to help lower blood pressure. You might also take medicine to reach your blood pressure goal. 
Follow-up care is a key part of your treatment and safety. Be sure to make and go to all appointments, and call your doctor if you are having problems. It's also a good idea to know your test results and keep a list of the medicines you take. How can you care for yourself at home? Medical treatment · If you stop taking your medicine, your blood pressure will go back up. You may take one or more types of medicine to lower your blood pressure. Be safe with medicines. Take your medicine exactly as prescribed. Call your doctor if you think you are having a problem with your medicine. · Talk to your doctor before you start taking aspirin every day. Aspirin can help certain people lower their risk of a heart attack or stroke. But taking aspirin isn't right for everyone, because it can cause serious bleeding. · See your doctor regularly. You may need to see the doctor more often at first or until your blood pressure comes down. · If you are taking blood pressure medicine, talk to your doctor before you take decongestants or anti-inflammatory medicine, such as ibuprofen. Some of these medicines can raise blood pressure. · Learn how to check your blood pressure at home. Lifestyle changes · Stay at a healthy weight. This is especially important if you put on weight around the waist. Losing even 10 pounds can help you lower your blood pressure. · If your doctor recommends it, get more exercise.  Walking is a good choice. Bit by bit, increase the amount you walk every day. Try for at least 30 minutes on most days of the week. You also may want to swim, bike, or do other activities. · Avoid or limit alcohol. Talk to your doctor about whether you can drink any alcohol. · Try to limit how much sodium you eat to less than 2,300 milligrams (mg) a day. Your doctor may ask you to try to eat less than 1,500 mg a day. · Eat plenty of fruits (such as bananas and oranges), vegetables, legumes, whole grains, and low-fat dairy products. · Lower the amount of saturated fat in your diet. Saturated fat is found in animal products such as milk, cheese, and meat. Limiting these foods may help you lose weight and also lower your risk for heart disease. · Do not smoke. Smoking increases your risk for heart attack and stroke. If you need help quitting, talk to your doctor about stop-smoking programs and medicines. These can increase your chances of quitting for good. When should you call for help? Call 911 anytime you think you may need emergency care. This may mean having symptoms that suggest that your blood pressure is causing a serious heart or blood vessel problem. Your blood pressure may be over 180/110. ? For example, call 911 if: 
? · You have symptoms of a heart attack. These may include: ¨ Chest pain or pressure, or a strange feeling in the chest. 
¨ Sweating. ¨ Shortness of breath. ¨ Nausea or vomiting. ¨ Pain, pressure, or a strange feeling in the back, neck, jaw, or upper belly or in one or both shoulders or arms. ¨ Lightheadedness or sudden weakness. ¨ A fast or irregular heartbeat. ? · You have symptoms of a stroke. These may include: 
¨ Sudden numbness, tingling, weakness, or loss of movement in your face, arm, or leg, especially on only one side of your body. ¨ Sudden vision changes. ¨ Sudden trouble speaking. ¨ Sudden confusion or trouble understanding simple statements. ¨ Sudden problems with walking or balance. ¨ A sudden, severe headache that is different from past headaches. ? · You have severe back or belly pain. ?Do not wait until your blood pressure comes down on its own. Get help right away. ?Call your doctor now or seek immediate care if: 
? · Your blood pressure is much higher than normal (such as 180/110 or higher), but you don't have symptoms. ? · You think high blood pressure is causing symptoms, such as: ¨ Severe headache. ¨ Blurry vision. ? Watch closely for changes in your health, and be sure to contact your doctor if: 
? · Your blood pressure measures 140/90 or higher at least 2 times. That means the top number is 140 or higher or the bottom number is 90 or higher, or both. ? · You think you may be having side effects from your blood pressure medicine. ? · Your blood pressure is usually normal, but it goes above normal at least 2 times. Where can you learn more? Go to http://suzanne-justin.info/. Enter B779 in the search box to learn more about \"High Blood Pressure: Care Instructions. \" Current as of: September 21, 2016 Content Version: 11.4 © 7093-9534 Technit[employer]. Care instructions adapted under license by [employer] (which disclaims liability or warranty for this information). If you have questions about a medical condition or this instruction, always ask your healthcare professional. [address] any warranty or liability for your use of this information. DASH Diet: Care Instructions Your Care Instructions The DASH diet is an eating plan that can help lower your blood pressure. DASH stands for Dietary Approaches to Stop Hypertension. Hypertension is high blood pressure. The DASH diet focuses on eating foods that are high in calcium, potassium, and magnesium. These nutrients can lower blood pressure.  The foods that are highest in these nutrients are fruits, vegetables, low-fat dairy products, nuts, seeds, and legumes. But taking calcium, potassium, and magnesium supplements instead of eating foods that are high in those nutrients does not have the same effect. The DASH diet also includes whole grains, fish, and poultry. The DASH diet is one of several lifestyle changes your doctor may recommend to lower your high blood pressure. Your doctor may also want you to decrease the amount of sodium in your diet. Lowering sodium while following the DASH diet can lower blood pressure even further than just the DASH diet alone. Follow-up care is a key part of your treatment and safety. Be sure to make and go to all appointments, and call your doctor if you are having problems. It's also a good idea to know your test results and keep a list of the medicines you take. How can you care for yourself at home? Following the DASH diet · Eat 4 to 5 servings of fruit each day. A serving is 1 medium-sized piece of fruit, ½ cup chopped or canned fruit, 1/4 cup dried fruit, or 4 ounces (½ cup) of fruit juice. Choose fruit more often than fruit juice. · Eat 4 to 5 servings of vegetables each day. A serving is 1 cup of lettuce or raw leafy vegetables, ½ cup of chopped or cooked vegetables, or 4 ounces (½ cup) of vegetable juice. Choose vegetables more often than vegetable juice. · Get 2 to 3 servings of low-fat and fat-free dairy each day. A serving is 8 ounces of milk, 1 cup of yogurt, or 1 ½ ounces of cheese. · Eat 6 to 8 servings of grains each day. A serving is 1 slice of bread, 1 ounce of dry cereal, or ½ cup of cooked rice, pasta, or cooked cereal. Try to choose whole-grain products as much as possible. · Limit lean meat, poultry, and fish to 2 servings each day. A serving is 3 ounces, about the size of a deck of cards.  
· Eat 4 to 5 servings of nuts, seeds, and legumes (cooked dried beans, lentils, and split peas) each week. A serving is 1/3 cup of nuts, 2 tablespoons of seeds, or ½ cup of cooked beans or peas. · Limit fats and oils to 2 to 3 servings each day. A serving is 1 teaspoon of vegetable oil or 2 tablespoons of salad dressing. · Limit sweets and added sugars to 5 servings or less a week. A serving is 1 tablespoon jelly or jam, ½ cup sorbet, or 1 cup of lemonade. · Eat less than 2,300 milligrams (mg) of sodium a day. If you limit your sodium to 1,500 mg a day, you can lower your blood pressure even more. Tips for success · Start small. Do not try to make dramatic changes to your diet all at once. You might feel that you are missing out on your favorite foods and then be more likely to not follow the plan. Make small changes, and stick with them. Once those changes become habit, add a few more changes. · Try some of the following: ¨ Make it a goal to eat a fruit or vegetable at every meal and at snacks. This will make it easy to get the recommended amount of fruits and vegetables each day. ¨ Try yogurt topped with fruit and nuts for a snack or healthy dessert. ¨ Add lettuce, tomato, cucumber, and onion to sandwiches. ¨ Combine a ready-made pizza crust with low-fat mozzarella cheese and lots of vegetable toppings. Try using tomatoes, squash, spinach, broccoli, carrots, cauliflower, and onions. ¨ Have a variety of cut-up vegetables with a low-fat dip as an appetizer instead of chips and dip. ¨ Sprinkle sunflower seeds or chopped almonds over salads. Or try adding chopped walnuts or almonds to cooked vegetables. ¨ Try some vegetarian meals using beans and peas. Add garbanzo or kidney beans to salads. Make burritos and tacos with mashed ruth beans or black beans. Where can you learn more? Go to http://suzanne-justin.info/. Enter U320 in the search box to learn more about \"DASH Diet: Care Instructions. \" Current as of: September 21, 2016 Content Version: 11.4 © 6479-5234 Healthwise, Mantex. Care instructions adapted under license by [employer] (which disclaims liability or warranty for this information). If you have questions about a medical condition or this instruction, always ask your healthcare professional. [address] any warranty or liability for your use of this information. Well Visit, Over 72: Care Instructions Your Care Instructions Physical exams can help you stay healthy. Your doctor has checked your overall health and may have suggested ways to take good care of yourself. He or she also may have recommended tests. At home, you can help prevent illness with healthy eating, regular exercise, and other steps. Follow-up care is a key part of your treatment and safety. Be sure to make and go to all appointments, and call your doctor if you are having problems. It's also a good idea to know your test results and keep a list of the medicines you take. How can you care for yourself at home? · Reach and stay at a healthy weight. This will lower your risk for many problems, such as obesity, diabetes, heart disease, and high blood pressure. · Get at least 30 minutes of exercise on most days of the week. Walking is a good choice. You also may want to do other activities, such as running, swimming, cycling, or playing tennis or team sports. · Do not smoke. Smoking can make health problems worse. If you need help quitting, talk to your doctor about stop-smoking programs and medicines. These can increase your chances of quitting for good. · Protect your skin from too much sun. When you're outdoors from 10 a.m. to 4 p.m., stay in the shade or cover up with clothing and a hat with a wide brim. Wear sunglasses that block UV rays. Even when it's cloudy, put broad-spectrum sunscreen (SPF 30 or higher) on any exposed skin. · See a dentist one or two times a year for checkups and to have your teeth cleaned. · Wear a seat belt in the car. · Limit alcohol to 2 drinks a day for men and 1 drink a day for women. Too much alcohol can cause health problems. Follow your doctor's advice about when to have certain tests. These tests can spot problems early. For men and women · Cholesterol. Your doctor will tell you how often to have this done based on your overall health and other things that can increase your risk for heart attack and stroke. · Blood pressure. Have your blood pressure checked during a routine doctor visit. Your doctor will tell you how often to check your blood pressure based on your age, your blood pressure results, and other factors. · Diabetes. Ask your doctor whether you should have tests for diabetes. · Vision. Experts recommend that you have yearly exams for glaucoma and other age-related eye problems. · Hearing. Tell your doctor if you notice any change in your hearing. You can have tests to find out how well you hear. · Colon cancer tests. Keep having colon cancer tests as your doctor recommends. You can have one of several types of tests. · Heart attack and stroke risk. At least every 4 to 6 years, you should have your risk for heart attack and stroke assessed. Your doctor uses factors such as your age, blood pressure, cholesterol, and whether you smoke or have diabetes to show what your risk for a heart attack or stroke is over the next 10 years. · Osteoporosis. Talk to your doctor about whether you should have a bone density test to find out whether you have thinning bones. Also ask your doctor about whether you should take calcium and vitamin D supplements. For women · Pap test and pelvic exam. You may no longer need a Pap test. Talk with your doctor about whether to stop or continue to have Pap tests. · Breast exam and mammogram. Ask how often you should have a mammogram, which is an X-ray of your breasts.  A mammogram can spot breast cancer before it can be felt and when it is easiest to treat. · Thyroid disease. Talk to your doctor about whether to have your thyroid checked as part of a regular physical exam. Women have an increased chance of a thyroid problem. For men · Prostate exam. Talk to your doctor about whether you should have a blood test (called a PSA test) for prostate cancer. Experts disagree on whether men should have this test. Some experts recommend that you discuss the benefits and risks of the test with your doctor. · Abdominal aortic aneurysm. Ask your doctor whether you should have a test to check for an aneurysm. You may need a test if you ever smoked or if your parent, brother, sister, or child has had an aneurysm. When should you call for help? Watch closely for changes in your health, and be sure to contact your doctor if you have any problems or symptoms that concern you. Where can you learn more? Go to http://suzanne-justin.info/. Enter Y522 in the search box to learn more about \"Well Visit, Over 65: Care Instructions. \" Current as of: May 12, 2017 Content Version: 11.4 © 3870-8691 Milestone Scientific. Care instructions adapted under license by Over[employer] (which disclaims liability or warranty for this information). If you have questions about a medical condition or this instruction, always ask your healthcare professional. [address] any warranty or liability for your use of this information. Introducing [facility]! Dear Dottie Moses: Thank you for requesting a ShutterCal account. Our records indicate that you already have an active ShutterCal account. You can access your account anytime at [email or web address] [email or web address]. [email or web address] Innov[employer] The[employer]ies Did you know that you can access your hospital and ER discharge instructions at any time in ShutterCal? You can also review all of your test results from your hospital stay or ER visit. Additional Information If you have questions, please visit the Frequently Asked Questions section of the InboxFeverhart website at [email or web address] Remember, DYNAGENT SOFTWARE SL is NOT to be used for urgent needs. For medical emergencies, dial 911. Now available from your iPhone and Android! Please provide this summary of care documentation to your next provider. Your primary care clinician is listed as Karl Cash. If you have any questions after today's visit, please call [phone].

## 2017-12-16 LAB
BUN SERPL-MCNC: 11 MG/DL (ref 8–27)
BUN/CREAT SERPL: 14 (ref 10–24)
CALCIUM SERPL-MCNC: 9.6 MG/DL (ref 8.6–10.2)
CHLORIDE SERPL-SCNC: 98 MMOL/L (ref 96–106)
CO2 SERPL-SCNC: 25 MMOL/L (ref 18–29)
CREAT SERPL-MCNC: 0.8 MG/DL (ref 0.76–1.27)
EST. AVERAGE GLUCOSE BLD GHB EST-MCNC: 123 MG/DL
GFR SERPLBLD CREATININE-BSD FMLA CKD-EPI: 105 ML/MIN/1.73
GFR SERPLBLD CREATININE-BSD FMLA CKD-EPI: 91 ML/MIN/1.73
GLUCOSE SERPL-MCNC: 100 MG/DL (ref 65–99)
HBA1C MFR BLD: 5.9 % (ref 4.8–5.6)
POTASSIUM SERPL-SCNC: 4.4 MMOL/L (ref 3.5–5.2)
SODIUM SERPL-SCNC: 136 MMOL/L (ref 134–144)

## 2018-05-30 ENCOUNTER — TELEPHONE (OUTPATIENT)
Dept: INTERNAL MEDICINE CLINIC | Age: 73
End: 2018-05-30

## 2018-05-30 DIAGNOSIS — Z12.83 SCREENING FOR MALIGNANT NEOPLASM OF SKIN: Primary | ICD-10-CM

## 2018-05-30 NOTE — TELEPHONE ENCOUNTER
Dr Falguni Rodas  (Dermatology)    [address] Sharmaine Mccoy    Phone [phone]    Fax  [phone]    Follow up and Shots on Both Arms    07/10/2018  -   3:30 PM    [employer] - [address] (MEDICARE REPLACEMENT/ADVANTAGE - HMO)     [id number]

## 2018-06-07 NOTE — TELEPHONE ENCOUNTER
Referral has been Obtained & Faxed To Dr Mary Walker [phone]    [facility] #  [id number]   No # Visits  99  Dates Covered  06/07/2018 - 06/07/2019

## 2018-06-11 ENCOUNTER — TELEPHONE (OUTPATIENT)
Dept: INTERNAL MEDICINE CLINIC | Age: 73
End: 2018-06-11

## 2018-06-11 NOTE — TELEPHONE ENCOUNTER
----- Message from Haider Tejeda sent at 6/11/2018  1:58 PM EDT -----  Regarding: Dr. Jl Huerta  Pt is requesting a referral for an ophthalmologists (Dr. Peyton Diaz, fax, [phone]).  Pt best contact [phone]

## 2018-06-18 ENCOUNTER — TELEPHONE (OUTPATIENT)
Dept: INTERNAL MEDICINE CLINIC | Age: 73
End: 2018-06-18

## 2018-06-18 DIAGNOSIS — H25.813 COMBINED FORMS OF AGE-RELATED CATARACT OF BOTH EYES: Primary | ICD-10-CM

## 2018-06-18 NOTE — TELEPHONE ENCOUNTER
Dr Mary Jo Epps  [facility])    [address] [facility] [address]    Phone [phone]    Fax [phone]    06/21/2018 Now         [facility] (MEDICARE REPLACEMENT/ADVANTAGE - HMO    [id number]

## 2018-06-18 NOTE — TELEPHONE ENCOUNTER
The referral has been obtained and faxed to Dr Farrell's office at [phone]. Auth # [id number] 78 visits  6/18/18-6/18/19.

## 2018-07-10 ENCOUNTER — OFFICE VISIT (OUTPATIENT)
Dept: DERMATOLOGY | Facility: AMBULATORY SURGERY CENTER | Age: 73
End: 2018-07-10

## 2018-07-10 VITALS
RESPIRATION RATE: 14 BRPM | OXYGEN SATURATION: 98 % | BODY MASS INDEX: 32.31 KG/M2 | DIASTOLIC BLOOD PRESSURE: 82 MMHG | SYSTOLIC BLOOD PRESSURE: 132 MMHG | TEMPERATURE: 98.4 F | WEIGHT: 213.2 LBS | HEART RATE: 77 BPM | HEIGHT: 68 IN

## 2018-07-10 DIAGNOSIS — Z85.828 HISTORY OF NONMELANOMA SKIN CANCER: ICD-10-CM

## 2018-07-10 DIAGNOSIS — Z85.828 FOLLOW-UP SURVEILLANCE OF SKIN CANCER, ENCOUNTER FOR: ICD-10-CM

## 2018-07-10 DIAGNOSIS — Z08 FOLLOW-UP SURVEILLANCE OF SKIN CANCER, ENCOUNTER FOR: ICD-10-CM

## 2018-07-10 DIAGNOSIS — D22.9 MULTIPLE BENIGN NEVI: Primary | ICD-10-CM

## 2018-07-10 DIAGNOSIS — L82.1 SEBORRHEIC KERATOSES: ICD-10-CM

## 2018-07-10 DIAGNOSIS — B35.1 ONYCHOMYCOSIS: ICD-10-CM

## 2018-07-10 NOTE — PROGRESS NOTES
Name: Ranulfo Barrientos       Age: 72 y.o. Date: 7/10/2018    Chief Complaint:   Chief Complaint   Patient presents with    Skin Problem       Subjective:    HPI  Mr. Ranulfo Barrietnos is a 72 y.o. male who presents for a full skin exam.  The patient's last skin exam was on 6/19/17 and the patient does have current complaints related to his skin. He notes thickened toenails on the left foot. The patient's pertinent skin history includes : SCC left chest, AK    ROS: Constitutional: Negative. Dermatological : positive for - nail changes      Social History     Social History    Marital status:      Spouse name: N/A    Number of children: N/A    Years of education: N/A     Occupational History    Not on file.      Social History Main Topics    Smoking status: Former Smoker     Types: Cigars     Quit date: 11/12/1997    Smokeless tobacco: Never Used    Alcohol use 6.0 oz/week     12 Glasses of wine per week    Drug use: No    Sexual activity: Not Currently     Partners: Female     Other Topics Concern    Not on file     Social History Narrative       Family History   Problem Relation Age of Onset    Heart Disease Mother     Diabetes Maternal Uncle     Dementia Maternal Grandmother     Diabetes Other     Cancer Neg Hx     Hypertension Neg Hx        Past Medical History:   Diagnosis Date    Arthritis     cervical spine    Calculus of kidney     GERD (gastroesophageal reflux disease)     Hypercholesterolemia     Memory change     Peripheral sensory-motor axonal polyneuropathy     Skin cancer     SCC on chest    Sun-damaged skin     Sunburn, blistering     Tinnitus        Past Surgical History:   Procedure Laterality Date    HX ORTHOPAEDIC      R grt toe surgery       Current Outpatient Prescriptions   Medication Sig Dispense Refill    omeprazole (PRILOSEC) 20 mg capsule TAKE ONE CAPSULE BY MOUTH DAILY 90 Cap 2    cyanocobalamin (VITAMIN B-12) 1,000 mcg tablet Take 1,000 mcg by mouth daily.  MULTIVITAMIN (MULTIPLE VITAMIN PO) Take  by mouth.  psyllium husk, with sugar, 3.4 gram pwpk Take  by mouth nightly.  ascorbic acid (VITAMIN C) 500 mg tablet Take  by mouth.  Omega-3 Fatty Acids (FISH OIL) 300 mg cap Take  by mouth. One per day      ASPIRIN (ASPIR-81 PO) Take  by mouth. No Known Allergies      Objective:    Visit Vitals    /82 (BP 1 Location: Left arm, BP Patient Position: Sitting)    Pulse 77    Temp 98.4 °F (36.9 °C) (Oral)    Resp 14    Ht 5' 8\" (1.727 m)    Wt 96.7 kg (213 lb 3.2 oz)    SpO2 98%    BMI 32.42 kg/m2       Alin Valenzuela is a 72 y.o. male who appears well and in no distress. He is awake, alert, and oriented. There is no preauricular, submandibular, or cervical lymphadenopathy. A skin examination was performed including his scalp, face (including eyelids), ears, neck, chest, back, abdomen, upper extremities (including digits/nails), lower extremities, breasts, buttocks; genital skin was not examined. He has scattered stuck on waxy macules and keratotic papules consistent with seborrheic keratoses. There are scattered medium brown nevi without without concerning features. He is mildly diaphoretic. There are thickened and yellowed dystrophic toenails on the left foot (1-3) consistent with onychomycosis. No evidence of SCC recurrence on the left chest.     Assessment/Plan:  1. Personal history of skin cancer. I discussed sun protection, sunscreen use, the warning signs of skin cancer, the need for self-skin examinations, and the need for regular practitioner exams every 1 year. The patient should follow up sooner as needed if new, changing, or symptomatic skin lesions arise. 2.Normal nevi. The diagnosis of normal nevi was reviewed. I discussed sun protection, sunscreen use, the warning signs of skin cancer, the need for self-skin examinations, and the need for regular practitioner exams every 1 year.   The patient should follow up sooner as needed if new, changing, or symptomatic skin lesions arise. 3.Seborrheic keratoses. The diagnosis was reviewed and the patient was reassured that no treatment is needed for these benign lesions. 4. Onychomycosis. Discussed diagnosis and options for treatment. Given duration of topical and side effects of oral therapy - he elects no treatment at this time.

## 2018-07-10 NOTE — PROGRESS NOTES
Katherine Wilcox is a 72 y.o. male      Chief Complaint   Patient presents with    Skin Problem       1. Have you been to the ER, urgent care clinic since your last visit? Hospitalized since your last visit? No    2. Have you seen or consulted any other health care providers outside of the [address] [place] [address] since your last visit? Include any pap smears or colon screening.  No

## 2018-09-05 ENCOUNTER — HOSPITAL ENCOUNTER (EMERGENCY)
Age: 73
Discharge: HOME | End: 2018-09-05
Attending: EMERGENCY MEDICINE
Payer: MEDICARE

## 2018-09-05 ENCOUNTER — APPOINTMENT (OUTPATIENT)
Dept: CT IMAGING | Age: 73
End: 2018-09-05
Attending: EMERGENCY MEDICINE
Payer: MEDICARE

## 2018-09-05 VITALS
TEMPERATURE: 97.5 F | DIASTOLIC BLOOD PRESSURE: 85 MMHG | WEIGHT: 210 LBS | HEIGHT: 68 IN | RESPIRATION RATE: 18 BRPM | OXYGEN SATURATION: 98 % | BODY MASS INDEX: 31.83 KG/M2 | HEART RATE: 87 BPM | SYSTOLIC BLOOD PRESSURE: 140 MMHG

## 2018-09-05 DIAGNOSIS — N20.0 KIDNEY STONE: Primary | ICD-10-CM

## 2018-09-05 LAB
ALBUMIN SERPL-MCNC: 3.6 G/DL (ref 3.5–5)
ALBUMIN/GLOB SERPL: 1 {RATIO} (ref 1.1–2.2)
ALP SERPL-CCNC: 69 U/L (ref 45–117)
ALT SERPL-CCNC: 26 U/L (ref 12–78)
ANION GAP SERPL CALC-SCNC: 10 MMOL/L (ref 5–15)
APPEARANCE UR: CLEAR
AST SERPL-CCNC: 22 U/L (ref 15–37)
BACTERIA URNS QL MICRO: NEGATIVE /HPF
BASOPHILS # BLD: 0 K/UL (ref 0–0.1)
BASOPHILS NFR BLD: 1 % (ref 0–1)
BILIRUB SERPL-MCNC: 0.4 MG/DL (ref 0.2–1)
BILIRUB UR QL: NEGATIVE
BUN SERPL-MCNC: 9 MG/DL (ref 6–20)
BUN/CREAT SERPL: 10 (ref 12–20)
CALCIUM SERPL-MCNC: 9.1 MG/DL (ref 8.5–10.1)
CHLORIDE SERPL-SCNC: 104 MMOL/L (ref 97–108)
CO2 SERPL-SCNC: 23 MMOL/L (ref 21–32)
COLOR UR: ABNORMAL
COMMENT, HOLDF: NORMAL
CREAT SERPL-MCNC: 0.89 MG/DL (ref 0.7–1.3)
DIFFERENTIAL METHOD BLD: ABNORMAL
EOSINOPHIL # BLD: 0.1 K/UL (ref 0–0.4)
EOSINOPHIL NFR BLD: 2 % (ref 0–7)
EPITH CASTS URNS QL MICRO: ABNORMAL /LPF
ERYTHROCYTE [DISTWIDTH] IN BLOOD BY AUTOMATED COUNT: 12.2 % (ref 11.5–14.5)
GLOBULIN SER CALC-MCNC: 3.5 G/DL (ref 2–4)
GLUCOSE SERPL-MCNC: 98 MG/DL (ref 65–100)
GLUCOSE UR STRIP.AUTO-MCNC: NEGATIVE MG/DL
HCT VFR BLD AUTO: 43.6 % (ref 36.6–50.3)
HGB BLD-MCNC: 15.5 G/DL (ref 12.1–17)
HGB UR QL STRIP: ABNORMAL
HYALINE CASTS URNS QL MICRO: ABNORMAL /LPF (ref 0–5)
IMM GRANULOCYTES # BLD: 0.1 K/UL (ref 0–0.04)
IMM GRANULOCYTES NFR BLD AUTO: 1 % (ref 0–0.5)
KETONES UR QL STRIP.AUTO: NEGATIVE MG/DL
LEUKOCYTE ESTERASE UR QL STRIP.AUTO: NEGATIVE
LYMPHOCYTES # BLD: 2.5 K/UL (ref 0.8–3.5)
LYMPHOCYTES NFR BLD: 28 % (ref 12–49)
MCH RBC QN AUTO: 31.4 PG (ref 26–34)
MCHC RBC AUTO-ENTMCNC: 35.6 G/DL (ref 30–36.5)
MCV RBC AUTO: 88.4 FL (ref 80–99)
MONOCYTES # BLD: 0.7 K/UL (ref 0–1)
MONOCYTES NFR BLD: 8 % (ref 5–13)
NEUTS SEG # BLD: 5.4 K/UL (ref 1.8–8)
NEUTS SEG NFR BLD: 61 % (ref 32–75)
NITRITE UR QL STRIP.AUTO: NEGATIVE
NRBC # BLD: 0 K/UL (ref 0–0.01)
NRBC BLD-RTO: 0 PER 100 WBC
PH UR STRIP: 6 [PH] (ref 5–8)
PLATELET # BLD AUTO: 285 K/UL (ref 150–400)
PMV BLD AUTO: 10 FL (ref 8.9–12.9)
POTASSIUM SERPL-SCNC: 4.3 MMOL/L (ref 3.5–5.1)
PROT SERPL-MCNC: 7.1 G/DL (ref 6.4–8.2)
PROT UR STRIP-MCNC: 30 MG/DL
RBC # BLD AUTO: 4.93 M/UL (ref 4.1–5.7)
RBC #/AREA URNS HPF: >100 /HPF (ref 0–5)
SAMPLES BEING HELD,HOLD: NORMAL
SODIUM SERPL-SCNC: 137 MMOL/L (ref 136–145)
SP GR UR REFRACTOMETRY: 1.02 (ref 1–1.03)
UA: UC IF INDICATED,UAUC: ABNORMAL
UROBILINOGEN UR QL STRIP.AUTO: 0.2 EU/DL (ref 0.2–1)
WBC # BLD AUTO: 8.8 K/UL (ref 4.1–11.1)
WBC URNS QL MICRO: ABNORMAL /HPF (ref 0–4)

## 2018-09-05 PROCEDURE — 96361 HYDRATE IV INFUSION ADD-ON: CPT

## 2018-09-05 PROCEDURE — 74011250636 HC RX REV CODE- 250/636: Performed by: EMERGENCY MEDICINE

## 2018-09-05 PROCEDURE — 36415 COLL VENOUS BLD VENIPUNCTURE: CPT | Performed by: EMERGENCY MEDICINE

## 2018-09-05 PROCEDURE — 80053 COMPREHEN METABOLIC PANEL: CPT | Performed by: EMERGENCY MEDICINE

## 2018-09-05 PROCEDURE — 99283 EMERGENCY DEPT VISIT LOW MDM: CPT

## 2018-09-05 PROCEDURE — 85025 COMPLETE CBC W/AUTO DIFF WBC: CPT | Performed by: EMERGENCY MEDICINE

## 2018-09-05 PROCEDURE — 74176 CT ABD & PELVIS W/O CONTRAST: CPT

## 2018-09-05 PROCEDURE — 96374 THER/PROPH/DIAG INJ IV PUSH: CPT

## 2018-09-05 PROCEDURE — 81001 URINALYSIS AUTO W/SCOPE: CPT | Performed by: EMERGENCY MEDICINE

## 2018-09-05 RX ORDER — TAMSULOSIN HYDROCHLORIDE 0.4 MG/1
0.4 CAPSULE ORAL DAILY
Qty: 7 CAP | Refills: 0 | Status: SHIPPED | OUTPATIENT
Start: 2018-09-05 | End: 2018-09-12

## 2018-09-05 RX ORDER — ONDANSETRON 4 MG/1
4 TABLET, ORALLY DISINTEGRATING ORAL
Qty: 10 TAB | Refills: 0 | Status: SHIPPED | OUTPATIENT
Start: 2018-09-05 | End: 2019-01-29

## 2018-09-05 RX ORDER — KETOROLAC TROMETHAMINE 30 MG/ML
15 INJECTION, SOLUTION INTRAMUSCULAR; INTRAVENOUS
Status: COMPLETED | OUTPATIENT
Start: 2018-09-05 | End: 2018-09-05

## 2018-09-05 RX ORDER — HYDROCODONE BITARTRATE AND ACETAMINOPHEN 5; 325 MG/1; MG/1
1 TABLET ORAL
Qty: 10 TAB | Refills: 0 | Status: SHIPPED | OUTPATIENT
Start: 2018-09-05 | End: 2019-01-29

## 2018-09-05 RX ADMIN — KETOROLAC TROMETHAMINE 15 MG: 30 INJECTION, SOLUTION INTRAMUSCULAR at 19:42

## 2018-09-05 RX ADMIN — SODIUM CHLORIDE 1000 ML: 900 INJECTION, SOLUTION INTRAVENOUS at 19:46

## 2018-09-05 NOTE — ED TRIAGE NOTES
\"I have a kidney stone, I'm pretty sure. It hurts down in my stomach on the left into my back, it started about an hour ago. \" Patient does have prior hx of stone. Denies N/V.

## 2018-09-05 NOTE — ED NOTES
Pt arrived to the ED AAOX4, with c/o left flank pain onset PTA, denies any dysuria. Pt verbalizes no other complaint at this time. Pt is now in ED room with side rail up, bed to lowest position and call light within reach. VS in stable condition, will continue to monitor.

## 2018-09-05 NOTE — ED PROVIDER NOTES
HPI  
71 yo WM presents with left flank pain onset 1 hour ago, sudden onset, radiating to LLQ, pain 8/10. Denies fever, chills, nausea, vomiting, dysuria, hematuria. Hx of similar pain with kidney stone. Past Medical History:  
Diagnosis Date  Arthritis   
 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  
 Sun-damaged skin  Sunburn, blistering  Tinnitus Past Surgical History:  
Procedure Laterality Date  HX ORTHOPAEDIC    
 R grt toe surgery Family History:  
Problem Relation Age of Onset  Heart Disease Mother  Diabetes Maternal Uncle  Dementia Maternal Grandmother  Diabetes Other  Cancer Neg Hx  Hypertension Neg Hx Social History Social History  Marital status:  Spouse name: N/A  
 Number of children: N/A  
 Years of education: N/A Occupational History  Not on file. Social History Main Topics  Smoking status: Former Smoker Types: Cigars Quit date: 11/12/1997  Smokeless tobacco: Never Used  Alcohol use 6.0 oz/week 12 Glasses of wine per week  Drug use: No  
 Sexual activity: Not Currently Partners: Female Other Topics Concern  Not on file Social History Narrative ALLERGIES: Review of patient's allergies indicates no known allergies. Review of Systems Constitutional: Negative for chills and fever. Respiratory: Negative for cough. Cardiovascular: Negative for chest pain. Gastrointestinal: Negative for abdominal pain, diarrhea, nausea and vomiting. Genitourinary: Positive for flank pain. Negative for dysuria, hematuria and scrotal swelling. Neurological: Negative for headaches. All other systems reviewed and are negative. There were no vitals filed for this visit.       
 
Physical Exam  
 Physical Examination: General appearance - alert, mild distress, oriented to person, place, and time and normal appearing weight Eyes - pupils equal and reactive, extraocular eye movements intact Neck - supple, no significant adenopathy Chest - clear to auscultation, no wheezes, rales or rhonchi, symmetric air entry Heart - normal rate, regular rhythm, normal S1, S2, no murmurs, rubs, clicks or gallops Abdomen - soft, nontender, nondistended, no masses or organomegaly Back exam - full range of motion, no tenderness, palpable spasm or pain on motion Neurological - alert, oriented, normal speech, no focal findings or movement disorder noted Musculoskeletal - no joint tenderness, deformity or swelling Extremities - peripheral pulses normal, no pedal edema, no clubbing or cyanosis Skin - normal coloration and turgor, no rashes, no suspicious skin lesions noted MDM Number of Diagnoses or Management Options Kidney stone:  
  
Amount and/or Complexity of Data Reviewed Clinical lab tests: ordered and reviewed Tests in the radiology section of CPT®: ordered and reviewed Independent visualization of images, tracings, or specimens: yes Patient Progress Patient progress: improved ED Course Procedures Pt afebrile, nontoxic, VSS. +kidney stone. Will d/c with urology f/u.

## 2018-09-06 NOTE — DISCHARGE INSTRUCTIONS
Kidney Stone: Care Instructions  Your Care Instructions    Kidney stones are formed when salts, minerals, and other substances normally found in the urine clump together. They can be as small as grains of sand or, rarely, as large as golf balls. While the stone is traveling through the ureter, which is the tube that carries urine from the kidney to the bladder, you will probably feel pain. The pain may be mild or very severe. You may also have some blood in your urine. As soon as the stone reaches the bladder, any intense pain should go away. If a stone is too large to pass on its own, you may need a medical procedure to help you pass the stone. The doctor has checked you carefully, but problems can develop later. If you notice any problems or new symptoms, get medical treatment right away. Follow-up care is a key part of your treatment and safety. Be sure to make and go to all appointments, and call your doctor if you are having problems. It's also a good idea to know your test results and keep a list of the medicines you take. How can you care for yourself at home? · Drink plenty of fluids, enough so that your urine is light yellow or clear like water. If you have kidney, heart, or liver disease and have to limit fluids, talk with your doctor before you increase the amount of fluids you drink. · Take pain medicines exactly as directed. Call your doctor if you think you are having a problem with your medicine. ¨ If the doctor gave you a prescription medicine for pain, take it as prescribed. ¨ If you are not taking a prescription pain medicine, ask your doctor if you can take an over-the-counter medicine. Read and follow all instructions on the label. · Your doctor may ask you to strain your urine so that you can collect your kidney stone when it passes. You can use a kitchen strainer or a tea strainer to catch the stone. Store it in a plastic bag until you see your doctor again.   Preventing future kidney stones  Some changes in your diet may help prevent kidney stones. Depending on the cause of your stones, your doctor may recommend that you:  · Drink plenty of fluids, enough so that your urine is light yellow or clear like water. If you have kidney, heart, or liver disease and have to limit fluids, talk with your doctor before you increase the amount of fluids you drink. · Limit coffee, tea, and alcohol. Also avoid grapefruit juice. · Do not take more than the recommended daily dose of vitamins C and D.  · Avoid antacids such as Gaviscon, Maalox, Mylanta, or Tums. · Limit the amount of salt (sodium) in your diet. · Eat a balanced diet that is not too high in protein. · Limit foods that are high in a substance called oxalate, which can cause kidney stones. These foods include dark green vegetables, rhubarb, chocolate, wheat bran, nuts, cranberries, and beans. When should you call for help? Call your doctor now or seek immediate medical care if:    · You cannot keep down fluids.     · Your pain gets worse.     · You have a fever or chills.     · You have new or worse pain in your back just below your rib cage (the flank area).     · You have new or more blood in your urine.    Watch closely for changes in your health, and be sure to contact your doctor if:    · You do not get better as expected. Where can you learn more? Go to http://suzanne-justin.info/. Enter Q007 in the search box to learn more about \"Kidney Stone: Care Instructions. \"  Current as of: May 12, 2017  Content Version: 11.7  © 5330-1390 [employer]. Care instructions adapted under license by [employer] (which disclaims liability or warranty for this information). If you have questions about a medical condition or this instruction, always ask your healthcare professional. [address] any warranty or liability for your use of this information.          We hope that we have addressed all of your medical concerns. The examination and treatment you received in the Emergency Department were for an emergent problem and were not intended as complete care. It is important that you follow up with your healthcare provider(s) for ongoing care. If your symptoms worsen or do not improve as expected, and you are unable to reach your usual health care provider(s), you should return to the Emergency Department. Today's healthcare is undergoing tremendous change, and patient satisfaction surveys are one of the many tools to assess the quality of medical care. You may receive a survey from the Virtual [employer] regarding your experience in the Emergency Department. I hope that your experience has been completely positive, particularly the medical care that I provided. As such, please participate in the survey; anything less than excellent does not meet my expectations or intentions. [facility] and [address] [place] [address] participate in nationally recognized quality of care measures. If your blood pressure is greater than 120/80, as reported below, we urge that you seek medical care to address the potential of high blood pressure, commonly known as hypertension. Hypertension can be hereditary or can be caused by certain medical conditions, pain, stress, or \"white coat syndrome. \"       Please make an appointment with your health care provider(s) for follow up of your Emergency Department visit. VITALS:   Patient Vitals for the past 8 hrs:   Temp Pulse Resp BP SpO2   09/05/18 1946 97.4 °F (36.3 °C) - - - -   09/05/18 1925 - 70 18 (!) 170/94 98 %          Thank you for allowing us to provide you with medical care today. We realize that you have many choices for your emergency care needs. Please choose us in the future for any continued health care needs. Enmanuel Payan, [address] [place] [address] [address]   Office: [phone]            Recent Results (from the past 24 hour(s))   CBC WITH AUTOMATED DIFF    Collection Time: 09/05/18  7:36 PM   Result Value Ref Range    WBC 8.8 4.1 - 11.1 K/uL    RBC 4.93 4.10 - 5.70 M/uL    HGB 15.5 12.1 - 17.0 g/dL    HCT 43.6 36.6 - 50.3 %    MCV 88.4 80.0 - 99.0 FL    MCH 31.4 26.0 - 34.0 PG    MCHC 35.6 30.0 - 36.5 g/dL    RDW 12.2 11.5 - 14.5 %    PLATELET 951 451 - 727 K/uL    MPV 10.0 8.9 - 12.9 FL    NRBC 0.0 0  WBC    ABSOLUTE NRBC 0.00 0.00 - 0.01 K/uL    NEUTROPHILS 61 32 - 75 %    LYMPHOCYTES 28 12 - 49 %    MONOCYTES 8 5 - 13 %    EOSINOPHILS 2 0 - 7 %    BASOPHILS 1 0 - 1 %    IMMATURE GRANULOCYTES 1 (H) 0.0 - 0.5 %    ABS. NEUTROPHILS 5.4 1.8 - 8.0 K/UL    ABS. LYMPHOCYTES 2.5 0.8 - 3.5 K/UL    ABS. MONOCYTES 0.7 0.0 - 1.0 K/UL    ABS. EOSINOPHILS 0.1 0.0 - 0.4 K/UL    ABS. BASOPHILS 0.0 0.0 - 0.1 K/UL    ABS. IMM. GRANS. 0.1 (H) 0.00 - 0.04 K/UL    DF AUTOMATED     METABOLIC PANEL, COMPREHENSIVE    Collection Time: 09/05/18  7:36 PM   Result Value Ref Range    Sodium 137 136 - 145 mmol/L    Potassium 4.3 3.5 - 5.1 mmol/L    Chloride 104 97 - 108 mmol/L    CO2 23 21 - 32 mmol/L    Anion gap 10 5 - 15 mmol/L    Glucose 98 65 - 100 mg/dL    BUN 9 6 - 20 MG/DL    Creatinine 0.89 0.70 - 1.30 MG/DL    BUN/Creatinine ratio 10 (L) 12 - 20      GFR est AA >60 >60 ml/min/1.73m2    GFR est non-AA >60 >60 ml/min/1.73m2    Calcium 9.1 8.5 - 10.1 MG/DL    Bilirubin, total 0.4 0.2 - 1.0 MG/DL    ALT (SGPT) 26 12 - 78 U/L    AST (SGOT) 22 15 - 37 U/L    Alk.  phosphatase 69 45 - 117 U/L    Protein, total 7.1 6.4 - 8.2 g/dL    Albumin 3.6 3.5 - 5.0 g/dL    Globulin 3.5 2.0 - 4.0 g/dL    A-G Ratio 1.0 (L) 1.1 - 2.2     SAMPLES BEING HELD    Collection Time: 09/05/18  7:46 PM   Result Value Ref Range    SAMPLES BEING HELD RED,BLUE,GOLD     COMMENT        Add-on orders for these samples will be processed based on acceptable specimen integrity and analyte stability, which may vary by analyte. URINALYSIS W/ REFLEX CULTURE    Collection Time: 09/05/18  8:33 PM   Result Value Ref Range    Color YELLOW/STRAW      Appearance CLEAR CLEAR      Specific gravity 1.018 1.003 - 1.030      pH (UA) 6.0 5.0 - 8.0      Protein 30 (A) NEG mg/dL    Glucose NEGATIVE  NEG mg/dL    Ketone NEGATIVE  NEG mg/dL    Bilirubin NEGATIVE  NEG      Blood LARGE (A) NEG      Urobilinogen 0.2 0.2 - 1.0 EU/dL    Nitrites NEGATIVE  NEG      Leukocyte Esterase NEGATIVE  NEG      WBC 0-4 0 - 4 /hpf    RBC >100 (H) 0 - 5 /hpf    Epithelial cells FEW FEW /lpf    Bacteria NEGATIVE  NEG /hpf    UA:UC IF INDICATED CULTURE NOT INDICATED BY UA RESULT CNI      Hyaline cast 0-2 0 - 5 /lpf       Ct Abd Pelv Wo Cont    Result Date: 9/5/2018  EXAM:  CT ABD PELV WO CONT INDICATION: left flank pain  onset one hour ago radiation to left lower quadrant prior history of kidney stones COMPARISON: September 3, 2016 CONTRAST:  None. TECHNIQUE: Thin axial images were obtained through the abdomen and pelvis. Coronal and sagittal reconstructions were generated. Oral contrast was not administered. CT dose reduction was achieved through use of a standardized protocol tailored for this examination and automatic exposure control for dose modulation. The absence of intravenous contrast material reduces the sensitivity for evaluation of the solid parenchymal organs of the abdomen. FINDINGS: LUNG BASES: Clear. INCIDENTALLY IMAGED HEART AND MEDIASTINUM: Unremarkable. LIVER: No mass or biliary dilatation. GALLBLADDER: Unremarkable. SPLEEN: No mass. PANCREAS: No mass or ductal dilatation. ADRENALS: Unremarkable. KIDNEYS/URETERS: Bilateral intrarenal calcification, no obstruction on the right. 5mmCalculus within the proximal left ureter with hydronephrosis. STOMACH: Unremarkable. SMALL BOWEL: No dilatation or wall thickening. COLON: No dilatation or wall thickening. APPENDIX: Unremarkable. PERITONEUM: No ascites or pneumoperitoneum.  RETROPERITONEUM: No lymphadenopathy or aortic aneurysm. Prostate enlargement URINARY BLADDER: No mass or calculus. Not fully filled and cannot be fully evaluated BONES: No destructive bone lesion.  ADDITIONAL COMMENTS: N/A     IMPRESSION: Obstructing calculus proximal left ureter

## 2018-09-06 NOTE — ED NOTES
Discharge instructions given by provider. Pt verbalized understanding and has no further questions at this time. Pt leaving ED in stable condition.

## 2019-01-18 ENCOUNTER — TELEPHONE (OUTPATIENT)
Dept: INTERNAL MEDICINE CLINIC | Age: 74
End: 2019-01-18

## 2019-01-18 NOTE — TELEPHONE ENCOUNTER
Patient called to request a letter for a handicap decal. Please call patient to advise if he needs an appointment for this or can he  a letter for [facility].     [phone]

## 2019-01-18 NOTE — TELEPHONE ENCOUNTER
I spoke with patient to advise of Dr. Joan Rivas. Pt agreed to est. Care at another [facility] group. I told pt I will call to schedule him an appt with [facility]. Appt schedule for 01/29/19 at 10am with Dr. Marielle Alejandra. Pt was informed of the appt details and was thankful for the call.

## 2019-01-29 ENCOUNTER — OFFICE VISIT (OUTPATIENT)
Dept: FAMILY MEDICINE CLINIC | Age: 74
End: 2019-01-29

## 2019-01-29 VITALS
RESPIRATION RATE: 16 BRPM | DIASTOLIC BLOOD PRESSURE: 87 MMHG | HEIGHT: 68 IN | TEMPERATURE: 97.7 F | SYSTOLIC BLOOD PRESSURE: 136 MMHG | OXYGEN SATURATION: 97 % | HEART RATE: 61 BPM | WEIGHT: 218 LBS | BODY MASS INDEX: 33.04 KG/M2

## 2019-01-29 DIAGNOSIS — G60.0 CMT (CHARCOT-MARIE-TOOTH DISEASE): ICD-10-CM

## 2019-01-29 DIAGNOSIS — G60.3 IDIOPATHIC PROGRESSIVE POLYNEUROPATHY: Primary | ICD-10-CM

## 2019-01-29 DIAGNOSIS — K21.9 GASTROESOPHAGEAL REFLUX DISEASE WITHOUT ESOPHAGITIS: ICD-10-CM

## 2019-01-29 NOTE — PROGRESS NOTES
Isak Castanon is a 72 y.o. male who had concerns including Establish Care (new patient ) and Form Completion (patient desires handicap decal, ). Patient new to practice. He usually see's Dr. Laya Pantoja, but Dr. Nimo Calderon is out of the office. Neuropathy  He has CMT. He reports neuropathy in b/l lower extremities. He has not seen orthopedics recently for foot pain. No currently in pain. Reports numbness in his lower extremities and muscle weakness in his b/l lower extremities. Symptoms have been present for 4-5 years. He is unable to work full time due to the symptoms. He is not currently taking any medication at this time. He was previously on gabapentin and Norco, but has not been on these medications for several months. Patient seeking handicap form. GERD  History of GERD. He is currently on Omeprazole. Taking medication as prescribed. Symptoms are worsened with acidic foods. No melena or hematochezia. ROS: (positive in bold)  General: wt loss, fever, chills, fatigue   Cardiac: chest pain, palpitations, PERDUE, edema   Pul: SOB, dyspnea, wheezing, cough, hemoptysis  GI: abdominal pain, N&V, diarrhea, constipation   MS: joint pain, swelling, myalgia, back pain  Neuro: weakness, parasthesias, headache, lightheaded, dizziness.       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    Sun-damaged skin     Sunburn, blistering     Tinnitus        Past Surgical History:  Past Surgical History:   Procedure Laterality Date    HX ORTHOPAEDIC      R grt toe surgery       Family History:  Family History   Problem Relation Age of Onset    Heart Disease Mother     Diabetes Maternal Uncle     Dementia Maternal Grandmother  Diabetes Other     Cancer Neg Hx     Hypertension Neg Hx        Allergies:  No Known Allergies    Social History:  Social History     Tobacco Use    Smoking status: Former Smoker     Types: Cigars     Last attempt to quit: 1997     Years since quittin.2    Smokeless tobacco: Never Used   Substance Use Topics    Alcohol use: Yes     Alcohol/week: 4.2 oz     Types: 7 Glasses of wine per week     Comment: 1 glass of wine daily     Drug use: No       Current Meds:  Current Outpatient Medications on File Prior to Visit   Medication Sig Dispense Refill    omeprazole (PRILOSEC) 20 mg capsule TAKE ONE CAPSULE BY MOUTH DAILY 90 Cap 2    ASPIRIN (ASPIR-81 PO) Take  by mouth.  MULTIVITAMIN (MULTIPLE VITAMIN PO) Take  by mouth. No current facility-administered medications on file prior to visit. Visit Vitals  /87 (BP 1 Location: Left arm, BP Patient Position: Sitting)   Pulse 61   Temp 97.7 °F (36.5 °C) (Oral)   Resp 16   Ht 5' 8.25\" (1.734 m)   Wt 218 lb (98.9 kg)   SpO2 97%   BMI 32.90 kg/m²       Gen:  Well developed, well nourished male in no acute distress  HEENT: normocephalic/atraumatic;   Card:  RRR, no m/r/g  Chest:  CTAB, no w/r/r  Abd:  BS+, Soft, nontender/nondistended  MS: b/l ankle and foot pain . No swelling, full ROM, 5/5 strength BL      Assessment/Plan:      ICD-10-CM ICD-9-CM    1. 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 Gastroesophageal reflux disease without esophagitis K21.9 530.81       1. Neuropathy and CMT  Patient first visit at our practice. Had lengthy discussion with patient that in order to fill out handicap paperwork we would have to review previous PCP's records to determine if handicap is necessary. He does have CMT w/ neuropathy and has been on previous medications, however he is not currently on any treatment at this time.  Once previous records are reviewed will consider completing paperwork for handicap sticker. Patient to schedule a follow-up appointment once records are received. 2. GERD  Stable. No changes at this time. Will continue to monitor. I have discussed the diagnosis with the patient and the intended plan as seen in the above orders. The patient has received an after-visit summary and questions were answered concerning future plans. The patient agrees and understands above plan. Follow-up Disposition:  Return in about 3 months (around 4/29/2019). Patient discussed with supervising attending.     Jeyson Martinez, DO

## 2019-02-05 ENCOUNTER — DOCUMENTATION ONLY (OUTPATIENT)
Dept: FAMILY MEDICINE CLINIC | Age: 74
End: 2019-02-05

## 2019-02-05 NOTE — PROGRESS NOTES
LVM for patient:  Patient had called earlier and spoke with Aleja Rubin about the paperwork for a handicap sticker. Spoke with Dr. Melissa Chavez and he wanted to know if the patient was continue care with our group or keep Dr. Ayan Regalado (another [address] [place] [address] provider)? Dr. Melissa Chavez wasn't going to do paperwork if he wasn't going to switch care to our practice. This paperwork is meant for his PCP.

## 2019-02-07 ENCOUNTER — OFFICE VISIT (OUTPATIENT)
Dept: FAMILY MEDICINE CLINIC | Age: 74
End: 2019-02-07

## 2019-02-07 VITALS
WEIGHT: 218 LBS | TEMPERATURE: 98.3 F | BODY MASS INDEX: 33.04 KG/M2 | DIASTOLIC BLOOD PRESSURE: 85 MMHG | HEIGHT: 68 IN | OXYGEN SATURATION: 98 % | SYSTOLIC BLOOD PRESSURE: 144 MMHG | RESPIRATION RATE: 16 BRPM | HEART RATE: 65 BPM

## 2019-02-07 DIAGNOSIS — G60.3 IDIOPATHIC PROGRESSIVE POLYNEUROPATHY: ICD-10-CM

## 2019-02-07 DIAGNOSIS — G60.0 CMT (CHARCOT-MARIE-TOOTH DISEASE): Primary | ICD-10-CM

## 2019-02-07 NOTE — PROGRESS NOTES
Chief Complaint   Patient presents with   [address] Maintenance Due   Topic    DTaP/Tdap/Td series (1 - Tdap)    Shingrix Vaccine Age 49> (1 of 2)    Pneumococcal 65+ Low/Medium Risk (2 of 2 - PCV13)    Influenza Age 5 to Adult     MEDICARE YEARLY EXAM        Wt Readings from Last 3 Encounters:   02/07/19 218 lb (98.9 kg)   01/29/19 218 lb (98.9 kg)   09/05/18 210 lb (95.3 kg)     Temp Readings from Last 3 Encounters:   02/07/19 98.3 °F (36.8 °C) (Oral)   01/29/19 97.7 °F (36.5 °C) (Oral)   09/05/18 97.5 °F (36.4 °C)     BP Readings from Last 3 Encounters:   02/07/19 (!) 148/95   01/29/19 136/87   09/05/18 140/85     Pulse Readings from Last 3 Encounters:   02/07/19 65   01/29/19 61   09/05/18 87         Learning Assessment:  :     Learning Assessment 8/17/2015 7/28/2015 2/19/2015 2/13/2015 12/10/2014 2/20/2014   PRIMARY LEARNER Patient - Patient Patient Patient Patient   HIGHEST LEVEL OF EDUCATION - PRIMARY LEARNER  - - - - - > 4 Earl LEARNER - - - - - [address] CAREGIVER - - - - - No   PRIMARY [facility]   LEARNER PREFERENCE PRIMARY DEMONSTRATION - DEMONSTRATION READING READING VIDEOS   ANSWERED BY patient - patient Darby Theodore   RELATIONSHIP SELF - SELF SELF SELF SELF       Depression Screening:  :     PHQ over the last two weeks 2/7/2019   Little interest or pleasure in doing things Not at all   Feeling down, depressed, irritable, or hopeless Not at all   Total Score PHQ 2 0   Trouble falling or staying asleep, or sleeping too much -   Feeling tired or having little energy -   Poor appetite, weight loss, or overeating -   Feeling bad about yourself - or that you are a failure or have let yourself or your family down -   Trouble concentrating on things such as school, work, reading, or watching TV -   Moving or speaking so slowly that other people could have noticed; or the opposite being so fidgety that others notice -   Thoughts of being better off dead, or hurting yourself in some way -   PHQ 9 Score -       Fall Risk Assessment:  :     Fall Risk Assessment, last 12 mths 2/7/2019   Able to walk? Yes   Fall in past 12 months? No   Fall with injury? -   Number of falls in past 12 months -   Fall Risk Score -       Abuse Screening:  :     Abuse Screening Questionnaire 12/15/2017   Do you ever feel afraid of your partner? N   Are you in a relationship with someone who physically or mentally threatens you? N   Is it safe for you to go home? Y       Coordination of Care Questionnaire:  :     1) Have you been to an emergency room, urgent care clinic since your last visit? NO   Hospitalized since your last visit? NO             2) Have you seen or consulted any other health care providers outside of [address] [place] [address] since your last visit? NO  (Include any pap smears or colon screenings in this section.)  COLONOSCOPY ABT 2 YRS AGO  NORMAL    3) Do you have an Advance Directive on file? YES    Patient is accompanied by self I have received verbal consent from Lili Islas to discuss any/all medical information while they are present in the room.

## 2019-02-07 NOTE — PROGRESS NOTES
Waylon Goodman is a 72 y.o. male who had concerns including Form Completion (DISABLED PARKING PLACARD). Charleo Ledesma tooth/neuropathy  He has CMT. He reports neuropathy in b/l lower extremities. He has not seen orthopedics recently for foot pain. No currently in pain. .  Reports numbness in his lower extremities and muscle weakness in his b/l lower extremities. Symptoms have been present for 4-5 years. He is unable to work full time due to the symptoms. He is not currently taking any medication at this time. He was previously on gabapentin and Norco, but has not been on these medications for several months. Patient seeking handicap form as he has difficulties walking long distances due to foot pain. He was told by ortho that he is not a surgical candidate. ROS: (positive in bold)  General: wt loss, fever, chills, fatigue   Cardiac: chest pain, palpitations, PERDUE, edema   Pul: SOB, dyspnea, wheezing, cough, hemoptysis  GI: abdominal pain, N&V, diarrhea, constipation   MS: joint pain, swelling, myalgia, back pain  Neuro: weakness, parasthesias, headache, lightheaded, dizziness.       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    Sun-damaged skin     Sunburn, blistering     Tinnitus        Past Surgical History:  Past Surgical History:   Procedure Laterality Date    HX ORTHOPAEDIC      R grt toe surgery       Family History:  Family History   Problem Relation Age of Onset    Heart Disease Mother     Diabetes Maternal Uncle     Dementia Maternal Grandmother     Diabetes Other     Cancer Neg Hx     Hypertension Neg Hx        Allergies:  No Known Allergies    Social History:  Social History     Tobacco Use    Smoking status: Former Smoker     Types: Cigars     Last attempt to quit: 1997     Years since quittin.2    Smokeless tobacco: Never Used   Substance Use Topics    Alcohol use: Yes     Alcohol/week: 4.2 oz     Types: 7 Glasses of wine per week     Comment: 1 glass of wine daily     Drug use: No       Current Meds:  Current Outpatient Medications on File Prior to Visit   Medication Sig Dispense Refill    omeprazole (PRILOSEC) 20 mg capsule TAKE ONE CAPSULE BY MOUTH DAILY 90 Cap 2    ASPIRIN (ASPIR-81 PO) Take  by mouth.  MULTIVITAMIN (MULTIPLE VITAMIN PO) Take  by mouth. No current facility-administered medications on file prior to visit. Visit Vitals  /85   Pulse 65   Temp 98.3 °F (36.8 °C) (Oral)   Resp 16   Ht 5' 8.25\" (1.734 m)   Wt 218 lb (98.9 kg)   SpO2 98%   BMI 32.90 kg/m²       Gen:  Well developed, well nourished male in no acute distress  HEENT: normocephalic/atraumatic;   Card:  RRR, no m/r/g  Chest:  CTAB, no w/r/r  Abd:  BS+, Soft, nontender/nondistended  MS: b/l ankle and foot pain. Decreased sensation in b/l feet. No swelling, full ROM, 5/5 strength BL      Assessment/Plan:    ICD-10-CM ICD-9-CM    1. 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 Neuropathy and CMT   He does have CMT w/ neuropathy and has been on previous medications, however he is not currently on any treatment at this time. Previous records were reviewed and agree with above. He is not a surgical candidate. He has difficulty walking long distances due to condition. Forms filled out for handicap placard. I have discussed the diagnosis with the patient and the intended plan as seen in the above orders. The patient has received an after-visit summary and questions were answered concerning future plans. The patient agrees and understands above plan.        Follow-up Disposition:  Return in about 6 months (around 8/7/2019). Patient discussed with supervising attending.     Desmond Tere, DO

## 2019-02-08 NOTE — PROGRESS NOTES
[address] Medicine Residency Attending Addendum:  Patient encounter was discussed on the day of the encounter with Socrates Anguiano DO, performing the key elements of the service. I discussed the findings, assessment and plan with the resident and agree with the resident's findings and plan as documented in the resident's note.       Mary Kate Bangura MD, CAQSM, RMSK

## 2019-02-12 ENCOUNTER — TELEPHONE (OUTPATIENT)
Dept: FAMILY MEDICINE CLINIC | Age: 74
End: 2019-02-12

## 2019-02-12 NOTE — TELEPHONE ENCOUNTER
Called patient and left message advising his DMV forms are available for  at  or if he would prefer we can mail the documents to him. I asked him to call back and let us know if he would like us to mail the documents to his home.  For Now I have documents in envelope at  for  '

## 2019-02-13 ENCOUNTER — DOCUMENTATION ONLY (OUTPATIENT)
Dept: FAMILY MEDICINE CLINIC | Age: 74
End: 2019-02-13

## 2019-02-13 NOTE — PROGRESS NOTES
Pt called and requested DMV forms be mailed. I have addressed the envelope and they are ready to be mailed out in the outbox.

## 2019-08-08 ENCOUNTER — OFFICE VISIT (OUTPATIENT)
Dept: FAMILY MEDICINE CLINIC | Age: 74
End: 2019-08-08

## 2019-08-08 VITALS
WEIGHT: 212 LBS | OXYGEN SATURATION: 97 % | SYSTOLIC BLOOD PRESSURE: 125 MMHG | HEART RATE: 63 BPM | TEMPERATURE: 97.8 F | HEIGHT: 68 IN | RESPIRATION RATE: 17 BRPM | BODY MASS INDEX: 32.13 KG/M2 | DIASTOLIC BLOOD PRESSURE: 81 MMHG

## 2019-08-08 DIAGNOSIS — Z13.31 SCREENING FOR DEPRESSION: ICD-10-CM

## 2019-08-08 DIAGNOSIS — Z00.00 MEDICARE ANNUAL WELLNESS VISIT, SUBSEQUENT: ICD-10-CM

## 2019-08-08 DIAGNOSIS — G60.3 IDIOPATHIC PROGRESSIVE POLYNEUROPATHY: ICD-10-CM

## 2019-08-08 DIAGNOSIS — H53.8 BLURRED VISION, BILATERAL: ICD-10-CM

## 2019-08-08 DIAGNOSIS — Z71.89 ADVANCED DIRECTIVES, COUNSELING/DISCUSSION: ICD-10-CM

## 2019-08-08 DIAGNOSIS — Z12.5 SPECIAL SCREENING FOR MALIGNANT NEOPLASM OF PROSTATE: ICD-10-CM

## 2019-08-08 DIAGNOSIS — Z11.59 NEED FOR HEPATITIS C SCREENING TEST: ICD-10-CM

## 2019-08-08 DIAGNOSIS — Z13.39 SCREENING FOR ALCOHOLISM: ICD-10-CM

## 2019-08-08 DIAGNOSIS — E55.9 VITAMIN D DEFICIENCY: ICD-10-CM

## 2019-08-08 DIAGNOSIS — Z12.11 SCREEN FOR COLON CANCER: ICD-10-CM

## 2019-08-08 DIAGNOSIS — K21.9 GASTROESOPHAGEAL REFLUX DISEASE WITHOUT ESOPHAGITIS: ICD-10-CM

## 2019-08-08 DIAGNOSIS — R41.3 MEMORY CHANGE: ICD-10-CM

## 2019-08-08 DIAGNOSIS — R73.09 ELEVATED GLUCOSE: ICD-10-CM

## 2019-08-08 DIAGNOSIS — R73.03 PREDIABETES: ICD-10-CM

## 2019-08-08 DIAGNOSIS — Z23 ENCOUNTER FOR IMMUNIZATION: ICD-10-CM

## 2019-08-08 DIAGNOSIS — G60.0 CMT (CHARCOT-MARIE-TOOTH DISEASE): Primary | ICD-10-CM

## 2019-08-08 DIAGNOSIS — E78.5 HYPERLIPIDEMIA LDL GOAL <100: ICD-10-CM

## 2019-08-08 RX ORDER — OMEPRAZOLE 20 MG/1
CAPSULE, DELAYED RELEASE ORAL
Qty: 90 CAP | Refills: 2 | Status: ON HOLD | OUTPATIENT
Start: 2019-08-08 | End: 2020-01-22

## 2019-08-08 NOTE — PROGRESS NOTES
Identified pt with two pt identifiers(name and ). Reviewed record in preparation for visit and have obtained necessary documentation. Chief Complaint   Patient presents with   [facility] Annual Wellness Visit        Health Maintenance Due   Topic    DTaP/Tdap/Td series (1 - Tdap)    Shingrix Vaccine Age 50> (1 of 2)    Pneumococcal 65+ years (2 of 2 - PCV13)    MEDICARE YEARLY EXAM     Influenza Age 5 to Adult        Coordination of Care Questionnaire:  :   1) Have you been to an emergency room, urgent care, or hospitalized since your last visit? If yes, where when, and reason for visit? no       2. Have seen or consulted any other health care provider since your last visit? If yes, where when, and reason for visit? NO      3) Do you have an Advanced Directive/ Living Will in place? YES  If yes, do we have a copy on file NO- pt will try to find his and bring into office; he was also given a new Adv. Dir. Form to update, if needed  If no, would you like information NO    Patient is accompanied by self I have received verbal consent from Danelle Sutherland to discuss any/all medical information while they are present in the room.

## 2019-08-08 NOTE — PROGRESS NOTES
Discussed the patient's BMI with him. The BMI follow up plan is as follows:     dietary management education, guidance, and counseling  encourage exercise  monitor weight  prescribed dietary intake    An After Visit Summary was printed and given to the patient. The patient's abnormal BMI was reviewed and deemed target BMI for the patient. An After Visit Summary was provided to the patient and/or caregiver. This is the Subsequent Medicare Annual Wellness Exam, performed 12 months or more after the Initial AWV or the last Subsequent AWV    I have reviewed the patient's medical history in detail and updated the computerized patient record. History     Past Medical History:   Diagnosis Date    Arthritis     cervical spine    Calculus of kidney     Charcot-Lucero-Tooth disease     type 2    GERD (gastroesophageal reflux disease)     Hypercholesterolemia     Memory change     Peripheral sensory-motor axonal polyneuropathy     Skin cancer     SCC on chest    Sun-damaged skin     Sunburn, blistering     Tinnitus       Past Surgical History:   Procedure Laterality Date    HX ORTHOPAEDIC      R grt toe surgery     Current Outpatient Medications   Medication Sig Dispense Refill    pneumococcal 13 elizabeth conj dip (PREVNAR 13, PF,) 0.5 mL syrg injection 0.5 mL by IntraMUSCular route once for 1 dose. 0.5 mL 0    varicella-zoster recombinant, PF, (SHINGRIX, PF,) 50 mcg/0.5 mL susr injection 0.5mL by IntraMUSCular route once now and then repeat in 2-6 months 0.5 mL 1    diph,pertuss,acel,,tetanus vac,PF, (ADACEL) 2 Lf-(2.5-5-3-5 mcg)-5Lf/0.5 mL syrg vaccine 0.5 mL by IntraMUSCular route once for 1 dose. 0.5 mL 0    omeprazole (PRILOSEC) 20 mg capsule TAKE ONE CAPSULE BY MOUTH DAILY 90 Cap 2    ASPIRIN (ASPIR-81 PO) Take 81 mg by mouth daily.  MULTIVITAMIN (MULTIPLE VITAMIN PO) Take 1 Tab by mouth daily.        No Known Allergies  Family History   Problem Relation Age of Onset    Heart Disease Mother  Diabetes Maternal Uncle     Dementia Maternal Grandmother     Diabetes Other     Cancer Neg Hx     Hypertension Neg Hx      Social History     Tobacco Use    Smoking status: Former Smoker     Types: Cigars     Last attempt to quit: 1997     Years since quittin.7    Smokeless tobacco: Never Used   Substance Use Topics    Alcohol use: Yes     Alcohol/week: 7.0 standard drinks     Types: 7 Glasses of wine per week     Comment: 1 glass of wine daily      Patient Active Problem List   Diagnosis Code    GERD (gastroesophageal reflux disease) K21.9    Kidney stones N20.0    Squamous cell skin cancer C44.92    Prediabetes R73.03    Fracture, cervical vertebra (HCC) S12. 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.0    Advanced care planning/counseling discussion Z71.89    Hyperlipidemia LDL goal <100 E78.5    Elevated glucose R73.09    Memory change R41.3       Depression Risk Factor Screening:     3 most recent PHQ Screens 2019   Little interest or pleasure in doing things Not at all   Feeling down, depressed, irritable, or hopeless Not at all   Total Score PHQ 2 0   Trouble falling or staying asleep, or sleeping too much -   Feeling tired or having little energy -   Poor appetite, weight loss, or overeating -   Feeling bad about yourself - or that you are a failure or have let yourself or your family down -   Trouble concentrating on things such as school, work, reading, or watching TV -   Moving or speaking so slowly that other people could have noticed; or the opposite being so fidgety that others notice -   Thoughts of being better off dead, or hurting yourself in some way -   PHQ 9 Score -     Alcohol Risk Factor Screening: You do not drink alcohol or very rarely.     Functional Ability and Level of Safety: Hearing Loss  Hearing is good. Activities of Daily Living  The home contains: handrails  Patient does total self care    Fall Risk  Fall Risk Assessment, last 12 mths 2/7/2019   Able to walk? Yes   Fall in past 12 months? No   Fall with injury? -   Number of falls in past 12 months -   Fall Risk Score -       Abuse Screen  Patient is not abused    Cognitive Screening   Evaluation of Cognitive Function:  Has your family/caregiver stated any concerns about your memory: yes  Normal      Review of Systems   Constitutional: Negative. HENT: Positive for tinnitus. Eyes: Positive for blurred vision. Respiratory: Negative. Cardiovascular: Negative. Gastrointestinal: Negative. Genitourinary: Negative. Musculoskeletal: Negative. Skin: Negative. Neurological: Positive for tingling and weakness. Endo/Heme/Allergies: Negative. Psychiatric/Behavioral: Negative. Physical Exam   Constitutional: He is oriented to person, place, and time and well-developed, well-nourished, and in no distress. HENT:   Head: Normocephalic and atraumatic. Right Ear: External ear normal.   Left Ear: External ear normal.   Nose: Nose normal.   Mouth/Throat: No oropharyngeal exudate. Eyes: Conjunctivae are normal.   Neck: Normal range of motion. Neck supple. No thyromegaly present. Cardiovascular: Normal rate and regular rhythm. Pulmonary/Chest: Effort normal and breath sounds normal.   Abdominal: Soft. Bowel sounds are normal. He exhibits no distension. There is no tenderness. Musculoskeletal: Normal range of motion. He exhibits no edema. Lymphadenopathy:     He has no cervical adenopathy. Neurological: He is alert and oriented to person, place, and time. Skin: Skin is warm and dry. Psychiatric: Mood and affect normal.   Nursing note and vitals reviewed.     Patient Care Team   Patient Care Team:  Mac Hawley MD as PCP - General (Internal Medicine)    Assessment/Plan   Education and counseling provided:  Are appropriate based on today's review and evaluation    Diagnoses and all orders for this visit:    1. CMT (Charcot-Lucero-Tooth disease)  -     CBC WITH AUTOMATED DIFF    2. Gastroesophageal reflux disease without esophagitis    3. Idiopathic progressive polyneuropathy  -     REFERRAL TO NEUROLOGY    4. Hyperlipidemia LDL goal <100  -     LIPID PANEL  -     METABOLIC PANEL, COMPREHENSIVE  -     TSH 3RD GENERATION    5. Prediabetes  -     METABOLIC PANEL, COMPREHENSIVE  -     URINALYSIS W/ RFLX MICROSCOPIC  -     HEMOGLOBIN A1C WITH EAG    6. Memory change  -     REFERRAL TO NEUROLOGY    7. Medicare annual wellness visit, subsequent    8. Advanced directives, counseling/discussion    9. Screening for alcoholism  -     CT ANNUAL ALCOHOL SCREEN 15 MIN    10. Screening for depression  -     DEPRESSION SCREEN ANNUAL    11. Screen for colon cancer  -     OCCULT BLOOD IMMUNOASSAY,DIAGNOSTIC    12. Need for hepatitis C screening test  -     HEPATITIS C AB    13. Special screening for malignant neoplasm of prostate  -     PSA SCREENING (SCREENING)    14. Encounter for immunization  -     pneumococcal 13 elizabeth conj dip (PREVNAR 13, PF,) 0.5 mL syrg injection; 0.5 mL by IntraMUSCular route once for 1 dose.  -     varicella-zoster recombinant, PF, (SHINGRIX, PF,) 50 mcg/0.5 mL susr injection; 0.5mL by IntraMUSCular route once now and then repeat in 2-6 months  -     diph,pertuss,acel,,tetanus vac,PF, (ADACEL) 2 Lf-(2.5-5-3-5 mcg)-5Lf/0.5 mL syrg vaccine; 0.5 mL by IntraMUSCular route once for 1 dose. 15. Elevated glucose  -     HEMOGLOBIN A1C WITH EAG    16. Vitamin D deficiency  -     VITAMIN D, 25 HYDROXY    17.  Blurred vision, bilateral  -     REFERRAL TO OPHTHALMOLOGY    Other orders  -     omeprazole (PRILOSEC) 20 mg capsule; TAKE ONE CAPSULE BY MOUTH DAILY        Health Maintenance Due   Topic Date Due    DTaP/Tdap/Td series (1 - Tdap) 05/06/1968    Shingrix Vaccine Age 50> (1 of 2) 05/06/1997  Pneumococcal 65+ years (2 of 2 - PCV13) 09/26/2014    MEDICARE YEARLY EXAM  12/16/2018    Influenza Age 9 to Adult  08/01/2019

## 2019-08-08 NOTE — ACP (ADVANCE CARE PLANNING)
Advance Care Planning    Advance Care Planning (ACP) Provider Conversation Snapshot    Date of ACP Conversation: 08/08/19  Persons included in Conversation:  patient  Length of ACP Conversation in minutes:  16 minutes                For Patients with Decision Making Capacity:   Values/Goals: Exploration of values, goals, and preferences if recovery is not expected, even with continued medical treatment in the event of:  Imminent death  Severe, permanent brain injury    Conversation Outcomes / Follow-Up Plan:   Recommended completion of Advance Directive form after review of ACP materials and conversation with prospective healthcare agent

## 2019-08-27 ENCOUNTER — TELEPHONE (OUTPATIENT)
Dept: FAMILY MEDICINE CLINIC | Age: 74
End: 2019-08-27

## 2019-08-27 NOTE — TELEPHONE ENCOUNTER
Pt filled out advanced directive but there is no witness signature. Form left up front. Pt will need to redo form.  LVM to inform pt there is no signature but no detail left over VM

## 2019-10-14 ENCOUNTER — TELEPHONE (OUTPATIENT)
Dept: INTERNAL MEDICINE CLINIC | Age: 74
End: 2019-10-14

## 2019-10-14 NOTE — TELEPHONE ENCOUNTER
Yes 11:15 works prefect for patient, thanks !! Please unfreeze that apt spot whenever you can , So I can schedule the appointment . Thanks !

## 2019-10-14 NOTE — TELEPHONE ENCOUNTER
----- Message from Adina Sevilla Page sent at 10/14/2019  9:59 AM EDT -----  Regarding: Dr. Mendel Rasmussen Message/Vendor Calls    Caller's first and last name:  Brice Rasmussen    Reason for call:  Schedule appt    Callback required yes/no and why:  Yes, pt of Dr. Gila Campo, would like to schedule an appt on a Monday, Wednesday or Friday after 1 pm.     Best contact number(s):  [phone] or [phone]    Details to clarify the request:      Adina Pulido

## 2019-10-14 NOTE — TELEPHONE ENCOUNTER
Patient has been offered a NP apt with another provider in the office  , Dr Dmitriy Ramon on 11/19 Tuesday, at 11:15 .  Please let me know if this would work for PCPs schedule if not PSR will contact patient to r.s , was unable to find a Wednesday or Thursday , patient requested to be seen prior to the end of Nov.

## 2019-11-19 ENCOUNTER — HOSPITAL ENCOUNTER (OUTPATIENT)
Dept: LAB | Age: 74
Discharge: HOME | End: 2019-11-19

## 2019-11-19 ENCOUNTER — OFFICE VISIT (OUTPATIENT)
Dept: INTERNAL MEDICINE CLINIC | Age: 74
End: 2019-11-19

## 2019-11-19 VITALS
HEIGHT: 68 IN | SYSTOLIC BLOOD PRESSURE: 169 MMHG | OXYGEN SATURATION: 97 % | TEMPERATURE: 98.1 F | WEIGHT: 220 LBS | BODY MASS INDEX: 33.34 KG/M2 | HEART RATE: 75 BPM | RESPIRATION RATE: 18 BRPM | DIASTOLIC BLOOD PRESSURE: 93 MMHG

## 2019-11-19 DIAGNOSIS — G60.0 CMT (CHARCOT-MARIE-TOOTH DISEASE): ICD-10-CM

## 2019-11-19 DIAGNOSIS — R73.03 PREDIABETES: ICD-10-CM

## 2019-11-19 DIAGNOSIS — K21.9 GASTROESOPHAGEAL REFLUX DISEASE WITHOUT ESOPHAGITIS: ICD-10-CM

## 2019-11-19 DIAGNOSIS — I10 ESSENTIAL HYPERTENSION: ICD-10-CM

## 2019-11-19 DIAGNOSIS — Z12.5 SCREENING FOR MALIGNANT NEOPLASM OF PROSTATE: ICD-10-CM

## 2019-11-19 DIAGNOSIS — I10 ESSENTIAL HYPERTENSION: Primary | ICD-10-CM

## 2019-11-19 DIAGNOSIS — G60.3 IDIOPATHIC PROGRESSIVE POLYNEUROPATHY: ICD-10-CM

## 2019-11-19 LAB
ALBUMIN SERPL-MCNC: 4 G/DL (ref 3.5–5)
ALBUMIN/GLOB SERPL: 1.2 {RATIO} (ref 1.1–2.2)
ALP SERPL-CCNC: 67 U/L (ref 45–117)
ALT SERPL-CCNC: 27 U/L (ref 12–78)
ANION GAP SERPL CALC-SCNC: 7 MMOL/L (ref 5–15)
AST SERPL-CCNC: 24 U/L (ref 15–37)
BILIRUB SERPL-MCNC: 0.4 MG/DL (ref 0.2–1)
BUN SERPL-MCNC: 11 MG/DL (ref 6–20)
BUN/CREAT SERPL: 14 (ref 12–20)
CALCIUM SERPL-MCNC: 9.9 MG/DL (ref 8.5–10.1)
CHLORIDE SERPL-SCNC: 100 MMOL/L (ref 97–108)
CHOLEST SERPL-MCNC: 233 MG/DL
CO2 SERPL-SCNC: 29 MMOL/L (ref 21–32)
COMMENT, HOLDF: NORMAL
CREAT SERPL-MCNC: 0.81 MG/DL (ref 0.7–1.3)
ERYTHROCYTE [DISTWIDTH] IN BLOOD BY AUTOMATED COUNT: 12.6 % (ref 11.5–14.5)
EST. AVERAGE GLUCOSE BLD GHB EST-MCNC: 120 MG/DL
GLOBULIN SER CALC-MCNC: 3.4 G/DL (ref 2–4)
GLUCOSE SERPL-MCNC: 102 MG/DL (ref 65–100)
HBA1C MFR BLD: 5.8 % (ref 4–5.6)
HCT VFR BLD AUTO: 47.2 % (ref 36.6–50.3)
HDLC SERPL-MCNC: 48 MG/DL
HDLC SERPL: 4.9 {RATIO} (ref 0–5)
HGB BLD-MCNC: 15.6 G/DL (ref 12.1–17)
LDLC SERPL CALC-MCNC: 146.4 MG/DL (ref 0–100)
LIPID PROFILE,FLP: ABNORMAL
MCH RBC QN AUTO: 30.5 PG (ref 26–34)
MCHC RBC AUTO-ENTMCNC: 33.1 G/DL (ref 30–36.5)
MCV RBC AUTO: 92.4 FL (ref 80–99)
NRBC # BLD: 0 K/UL (ref 0–0.01)
NRBC BLD-RTO: 0 PER 100 WBC
PLATELET # BLD AUTO: 281 K/UL (ref 150–400)
PMV BLD AUTO: 10.3 FL (ref 8.9–12.9)
POTASSIUM SERPL-SCNC: 4.7 MMOL/L (ref 3.5–5.1)
PROT SERPL-MCNC: 7.4 G/DL (ref 6.4–8.2)
PSA SERPL-MCNC: 3 NG/ML (ref 0.01–4)
RBC # BLD AUTO: 5.11 M/UL (ref 4.1–5.7)
SAMPLES BEING HELD,HOLD: NORMAL
SODIUM SERPL-SCNC: 136 MMOL/L (ref 136–145)
TRIGL SERPL-MCNC: 193 MG/DL (ref ?–150)
VLDLC SERPL CALC-MCNC: 38.6 MG/DL
WBC # BLD AUTO: 9.7 K/UL (ref 4.1–11.1)

## 2019-11-19 RX ORDER — LISINOPRIL AND HYDROCHLOROTHIAZIDE 10; 12.5 MG/1; MG/1
1 TABLET ORAL DAILY
Qty: 30 TAB | Refills: 1 | Status: SHIPPED | OUTPATIENT
Start: 2019-11-19 | End: 2019-12-17

## 2019-11-19 NOTE — PROGRESS NOTES
HISTORY OF PRESENT ILLNESS  Bhanu Smith is a 69 y.o. male. HPI  GERD: Stable, pt continues to comply with Prilosec. He notes frequent breakthrough acid over the last 3-6 mo. Inquires about etiology. Polyneuropathy: Pt confirms DC Neurontin years ago. He does not recall improvement with his tingling. He does not take medications for it now. Charcot Lucero: Orthopedic informed pt that he has muscular atrophy in his hands and feet- and there was no surgical options. He is not currently on medications. Elevated BP: Pt's BP is 169/93 today in office. He notes that he used to take his BP daily when he worked at [employer]. Pt has been  since 1970 when he graduated [employer]. He was an /. He has 2 daughters (mid 42's) and 1 son (28). His daughter is now a doctor in the [employer]. His other daughter lives in [facility] too. 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 Pt has DC multivitamin compliance. Endorses stable urination stream/flow. PreDM: Stable and well-managed with diet and exercise. Review of Systems   Gastrointestinal:        Increased acid   All other systems reviewed and are negative. Physical Exam   Constitutional: He is oriented to person, place, and time. He appears well-developed and well-nourished. HENT:   Head: Normocephalic and atraumatic. Right Ear: External ear normal.   Left Ear: External ear normal.   Nose: Nose normal.   Mouth/Throat: Oropharynx is clear and moist.   Eyes: Pupils are equal, round, and reactive to light. Conjunctivae and EOM are normal.   Neck: Normal range of motion. Neck supple. Cardiovascular: Normal rate, regular rhythm, normal heart sounds and intact distal pulses. Pulmonary/Chest: Effort normal and breath sounds normal.   Abdominal: Soft.  Bowel sounds are normal.   Genitourinary: Rectum normal, prostate normal, testes normal and penis normal. Rectal exam shows anal tone normal.   Musculoskeletal: Normal range of motion. Neurological: He is alert and oriented to person, place, and time. Skin: Skin is warm and dry. Psychiatric: He has a normal mood and affect. His behavior is normal. Judgment and thought content normal.   Nursing note and vitals reviewed. ASSESSMENT and PLAN  Diagnoses and all orders for this visit:    1. Essential hypertension  Prescribed Lisinopril-HCTZ. Informed pt that his BP has been trending upwards when looking into his chart hx. Advised pt to f/u in a few weeks to recheck his BP. Encouraged pt to check his BP at home. Will continue to monitor for improvements or changes.  -     LIPID PANEL; Future  -     METABOLIC PANEL, COMPREHENSIVE; Future  -     lisinopril-hydroCHLOROthiazide (PRINZIDE, ZESTORETIC) 10-12.5 mg per tablet; Take 1 Tab by mouth daily. 2. Gastroesophageal reflux disease without esophagitis  Not stable- increased breakthrough acid noted. Discussed with pt that his medication can be increased. Informed pt that he had his colonoscopy in 2014 with Dr. Jai Easley- and he should make an appt to see him again for an upper endoscopy. -     REFERRAL TO GASTROENTEROLOGY  -     CBC W/O DIFF; Future    3. CMT (Charcot-Lucero-Tooth disease)  Stable and well-managed with out medications. Will continue to monitor for improvements or changes. 4. Idiopathic progressive polyneuropathy  Stable and well-managed with out medications. Pt recalled using Neurontin in the past with no relief. Has not been to a neurologist stacy  Few years. Will continue to monitor for improvements or changes. 5. Prediabetes  Sugars stable. Continue to follow healthy diet and monitor sugars.   -     HEMOGLOBIN A1C WITH EAG; Future    6. Screening for malignant neoplasm of prostate  Ordered routine labs. Will continue to monitor for improvements or changes. -     PSA, DIAGNOSTIC (PROSTATE SPECIFIC AG);  Future    Lab results and schedule of future lab studies reviewed with patient. Reviewed diet, exercise and weight control. Written by Deedee See, as dictated by Ana María Ye MD.     Current diagnosis and concerns discussed with pt at length. Understands risks and benefits or current treatment plan and medications and accepts the treatment and medication with any possible risks. Pt asks appropriate questions which were answered. Pt instructed to call with any concerns or problems. This note will not be viewable in [address]

## 2019-12-17 ENCOUNTER — OFFICE VISIT (OUTPATIENT)
Dept: INTERNAL MEDICINE CLINIC | Age: 74
End: 2019-12-17

## 2019-12-17 VITALS
DIASTOLIC BLOOD PRESSURE: 81 MMHG | SYSTOLIC BLOOD PRESSURE: 147 MMHG | BODY MASS INDEX: 33.49 KG/M2 | TEMPERATURE: 97.3 F | WEIGHT: 221 LBS | HEIGHT: 68 IN | RESPIRATION RATE: 20 BRPM | OXYGEN SATURATION: 98 % | HEART RATE: 91 BPM

## 2019-12-17 DIAGNOSIS — G60.0 CMT (CHARCOT-MARIE-TOOTH DISEASE): ICD-10-CM

## 2019-12-17 DIAGNOSIS — K21.9 GASTROESOPHAGEAL REFLUX DISEASE WITHOUT ESOPHAGITIS: ICD-10-CM

## 2019-12-17 DIAGNOSIS — I10 ESSENTIAL HYPERTENSION: Primary | ICD-10-CM

## 2019-12-17 RX ORDER — LISINOPRIL AND HYDROCHLOROTHIAZIDE 10; 12.5 MG/1; MG/1
1 TABLET ORAL 2 TIMES DAILY
Qty: 60 TAB | Refills: 3 | Status: SHIPPED | OUTPATIENT
Start: 2019-12-17 | End: 2020-03-04

## 2019-12-17 NOTE — PROGRESS NOTES
HISTORY OF PRESENT ILLNESS  Marta Renee is a 69 y.o. male. HPI   Hypertension ROS: taking medications as instructed, no medication side effects noted, no TIA's, no chest pain on exertion, no dyspnea on exertion, no swelling of ankles. New concerns: BP in office today is 147/81. Pt reports that he has not been exercising as much as he usually does. GERD: Not stable, pt continues to comply with Prilosec. Pt has been taking Prilosec and Omeprazole for almost 40 years. He notes the meds are not working as well. He notes increased break through acid over the last few weeks. He is seeing a GI tomorrow. Confirms he continues on Aspirin. Charcot Lucero: Stable, with no noted worsening of his sx. Review of Systems   Gastrointestinal: Positive for heartburn. All other systems reviewed and are negative. Physical Exam  Vitals signs and nursing note reviewed. Constitutional:       Appearance: He is well-developed. He is diaphoretic. HENT:      Head: Normocephalic and atraumatic. Right Ear: External ear normal.      Left Ear: External ear normal.      Nose: Nose normal.   Eyes:      Conjunctiva/sclera: Conjunctivae normal.      Pupils: Pupils are equal, round, and reactive to light. Neck:      Musculoskeletal: Normal range of motion and neck supple. Cardiovascular:      Rate and Rhythm: Normal rate and regular rhythm. Heart sounds: Normal heart sounds. Pulmonary:      Effort: Pulmonary effort is normal.      Breath sounds: Normal breath sounds. Abdominal:      General: Bowel sounds are normal.      Palpations: Abdomen is soft. Genitourinary:     Penis: Normal.       Scrotum/Testes: Normal.      Prostate: Normal.      Rectum: Normal. Normal anal tone. Musculoskeletal: Normal range of motion. Skin:     General: Skin is warm. Neurological:      Mental Status: He is alert and oriented to person, place, and time.    Psychiatric:         Behavior: Behavior normal.         Thought Content: Thought content normal.         Judgment: Judgment normal.         ASSESSMENT and PLAN  Diagnoses and all orders for this visit:    1. Essential hypertension  Discussed with pt that he should comply with 1 tablet BID. Informed pt that his goal is 130/80. Explained that with age, our vasculature changes. Will continue to monitor for improvements or changes. Advised pt to f/u in 2-3 months to recheck BP and kidney/liver function. -     lisinopril-hydroCHLOROthiazide (PRINZIDE, ZESTORETIC) 10-12.5 mg per tablet; Take 1 Tab by mouth two (2) times a day. 2. CMT (Charcot-Lucero-Tooth disease)  Stable and well-managed with out medications. Will continue to monitor for improvements or changes. 3. Gastroesophageal reflux disease without esophagitis  Not stable, pt is f/u with a GI tomorrow. Informed pt that Aspirin can cause stomach issues and increased acid. Discussed with pt that he does not need to take Aspirin since he has not had a cardiac event or cardiac concern. Will continue to monitor for improvements or changes. Lab results and schedule of future lab studies reviewed with patient. Reviewed diet, exercise and weight control. Written by Laura Ponce, as dictated by Anjel Hoffman MD.     Current diagnosis and concerns discussed with pt at length. Understands risks and benefits or current treatment plan and medications and accepts the treatment and medication with any possible risks. Pt asks appropriate questions which were answered. Pt instructed to call with any concerns or problems. This note will not be viewable in [address]

## 2020-01-17 NOTE — PERIOP NOTES
[address]  Endoscopy Preprocedure Instructions      1. On the day of your surgery, please report to registration located on the 2nd floor of the  [facility]. yes    2. You must have a responsible adult to drive you to the hospital, stay at the hospital during your procedure and drive you home. If they leave your procedure will not be started (no exceptions). yes    3. Do not have anything to eat or drink (including water, gum, mints, coffee, and juice) after midnight. This does not apply to the medications you were instructed to take by your physician. yesIf you are currently taking Plavix, Coumadin, Aspirin, or other blood-thinning agents, contact your physician for special instructions. yes,    4. If you are having a procedure that requires bowel prep: We recommend that you have only clear liquids the day before your procedure and begin your bowel prep by 5:00 pm.  You may continue to drink clear liquids until midnight. If for any reason you are not able to complete your prep please notify your physician immediately. yes    5. Have a list of all current medications, including vitamins, herbal supplements and any other over the counter medications. yes    6. If you wear glasses, contacts, dentures and/or hearing aids, they may be removed prior to procedure, please bring a case to store them in. yes    7. You should understand that if you do not follow these instructions your procedure may be cancelled. If your physical condition changes (I.e. fever, cold or flu) please contact your doctor as soon as possible. 8. It is important that you be on time.   If for any reason you are unable to keep your appointment please call )- the day of or your physicians office prior to your scheduled procedure

## 2020-01-20 NOTE — H&P
Date: 2019 10:00 AM  
Patient Name: Melva Mckeon. Account #: [id number] Gender: Male  
 (age): 10/19/1940 (70) Provider:    
Chantal Arthur MD  
  
 
Referring Physician:    
Aubrey Reid MD 
[address] 
[phone] (phone) 
[phone] (fax) Chief Complaint: I need a colonoscopy. History of Present Illness:  
51-year-old male due for screening colonoscopy but on intake evaluation over the phone was noted to be taking an antiplatelet agent and as a result has been scheduled for an office consultation. He denies blood in stool abdominal pain on a spleen weight loss or change in bowel habits. We reviewed the fact that he has established coronary vascular disease and in the past has required cardiac catheterization with intravascular stent placements. His last coronary stent placement was greater than 1 year ago and we concluded that were a polyp to be removed it would be safe to temporarily pauses antiplatelet therapy. We discussed the indications risks benefits and alternatives to screening colonoscopy and he has asked that we proceed. In light of his vascular disease we talked about his exercise tolerance. He does not report regular problems with exercise induced chest pain. He does have a prescription for nitroglycerin but he cannot recall the last time he had to use that. He sleeps on one to two pillows at night denies peripheral edema or waking up in the middle of the night breathless. ? 51-year-old male due for screening colonoscopy but on intake evaluation over the phone was noted to be taking an antiplatelet agent and as a result has been scheduled for an office consultation. He denies blood in stool abdominal pain on a spleen weight loss or change in bowel habits.   We reviewed the fact that he has established coronary vascular disease and in the past has required cardiac catheterization with intravascular stent placements. His last coronary stent placement was greater than 1 year ago and we concluded that were a polyp to be removed it would be safe to temporarily pauses antiplatelet therapy. We discussed the indications risks benefits and alternatives to screening colonoscopy and he has asked that we proceed. In light of his vascular disease we talked about his exercise tolerance. He does not report regular problems with exercise induced chest pain. He does have a prescription for nitroglycerin but he cannot recall the last time he had to use that. He sleeps on one to two pillows at night denies peripheral edema or waking up in the middle of the night breathless. ? Past Medical History Medical Conditions: Colitis Diabetes Mellitus Diverticulitis Hypercholesterolemia Ischemic Heart Disease Surgical Procedures: Coronary artery bypass surgery Hernia Repair Dx Studies: Colonoscopy, 2008 Medications: Adult Low Dose Aspirin 81 mg 
duloxetine 60 mg Lipitor 40 mg 
metformin 500 mg 
metoprolol succinate 50 mg 
omeprazole 20 mg Take 1 capsule by mouth once a day before breakfast for 60 days 
prasugrel 10 mg 
quetiapine 25 mg 
Zetia 10 mg Allergies: Patient has no known allergies or drug allergies Immunizations: Influenza, seasonal, injectable, 10/2019 Social History Alcohol: None Tobacco: Former smoker Drugs: None Exercise: Exercise less than 3 times a week. Caffeine: None Marital Status:   
  
  
Occupation:   
  
  
  
 
Family History No Knowledge Of Family History Review of Systems:  
Cardiovascular: Denies chest pain, irregular heart beat, palpitations, peripheral edema, syncope, Sweats. Constitutional: Denies fatigue, fever, loss of appetite, weight gain, weight loss. ENMT: Denies nose bleeds, sore throat, hearing loss. Endocrine: Denies excessive thirst, heat intolerance. Eyes: Denies loss of vision. Gastrointestinal: Denies abdominal pain, abdominal swelling, change in bowel habits, constipation, diarrhea, Bloating/gas, heartburn, jaundice, nausea, rectal bleeding, stomach cramps, vomiting, dysphagia, rectal pain, Stool incontinence, hematemesis. Genitourinary: Denies dark urine, dysuria, frequent urination, hematuria, incontinence. Hematologic/Lymphatic: Denies easy bruising, prolonged bleeding. Integumentary: Denies itching, rashes, sun sensitivity. Musculoskeletal: Denies arthritis, back pain, gout, joint pain, muscle weakness, stiffness. Neurological: Denies dizziness, fainting, frequent headaches, memory loss. Psychiatric: Presents suffers from anxiety. Denies depression, difficulty sleeping, hallucinations, nervousness, panic attacks, paranoia. Respiratory: Denies cough, dyspnea, wheezing. Vital Signs:  
BP 
(mmHg)  Pulse 
(ppm) Rhythm Weight (lbs/oz) Height (ft/in) BMI  
85/60 76 Regular 198 / 0 6 / 0 26.85 Physical Exam:  
Constitutional:   
 
Appearance: No distress, appears comfortable. Communication: Understands/receives spoken information. Skin: Inspection: No rash, no jaundice. Palpation: No subcutaneous nodules. Head/face: Inspection: Normacephalic, atraumatic. Palpation: normal.  
Eyes:   
 
Conjunctivae/lids: Normal.  
Pupils/irises: Pupils equal, round and normal.  
ENMT:   
 
External: Normal.  
Hearing: Normal.  
Neck:   
 
Neck: Normal appearance, trachea midline. Jugular veins: No JVD noted. Respiratory:   
 
Effort: Normal respiratory effort, comfortable, speaks in complete sentences. Auscultation: normal breath sounds, no rubs, wheezes or rhonchi. Gastrointestinal/Abdomen:   
 
Abdomen: non-distended, nontender. Liver/Spleen: normal, normal size, Liver size and consistency normal, spleen is non-palpable.   
Musculoskeletal:   
 
Gait/station: normal.  
 Digits/nails: Normal, no spooning of nails, clubbing, or splinter hemorrhages, no clubbing, cyanosis, petechiae or other inflammatory conditions. Psychiatric:   
 
Judgment/insight: Normal, normal judgement, normal insight. Orientation: oriented to time, space and person. Lymphatic:   
 
Neck: No lymphadenopathy in the cervical or supraclavicular chain no edema. [place] Other: No periumbilic lymphadenopathy. Lab Results: No Electronic Results Impressions: Screening colonoscopy (Screening for colonic neoplasia) Long term (current) use of antithrombotics/antiplatelets Stented coronary artery? ?Screening colonoscopy (Screening for colonic neoplasia)? ? 
? ? Long term (current) use of antithrombotics/antiplatelets? ? 
? ? Stented coronary artery? Assessment: ?   
  
 
Plan: Colonoscopy Suprep Bowel Prep Kit 17.5-3.13-1.6 gram Take as directed? ? Colonoscopy? ? 
? ? Suprep Bowel Prep Kit? ?17.5-3.13-1.6 gram? ?Take as directed? ? Risk & Medical Necessity: The patient requires Moderate to High Severity care for this visit. Diagnosis and management options are Extensive. The amount of data reviewed and/or ordered is Minimal/None. The level of risk is Extensive. Notes:   
  
  
   
Estefania Anderson. John Fernandez MD   
 Electronically signed on 2019 10:08:49 AM by Estefania Anderson. MD Morales Obrien Johanna Jorgetalia, MRN [id number],  10/19/1940 First Visit, Monday, 2019 New Modify Delete Delete all Edit Wording Sign  
 
[email or web address]_Content

## 2020-01-22 ENCOUNTER — HOSPITAL ENCOUNTER (OUTPATIENT)
Age: 75
Setting detail: OUTPATIENT SURGERY
Discharge: HOME | End: 2020-01-22
Attending: SPECIALIST | Admitting: SPECIALIST
Payer: MEDICARE

## 2020-01-22 ENCOUNTER — ANESTHESIA (OUTPATIENT)
Dept: ENDOSCOPY | Age: 75
End: 2020-01-22
Payer: MEDICARE

## 2020-01-22 ENCOUNTER — ANESTHESIA EVENT (OUTPATIENT)
Dept: ENDOSCOPY | Age: 75
End: 2020-01-22
Payer: MEDICARE

## 2020-01-22 VITALS
SYSTOLIC BLOOD PRESSURE: 118 MMHG | TEMPERATURE: 97.5 F | HEART RATE: 62 BPM | DIASTOLIC BLOOD PRESSURE: 79 MMHG | BODY MASS INDEX: 32.88 KG/M2 | WEIGHT: 222 LBS | RESPIRATION RATE: 19 BRPM | OXYGEN SATURATION: 97 % | HEIGHT: 69 IN

## 2020-01-22 PROCEDURE — 76060000031 HC ANESTHESIA FIRST 0.5 HR: Performed by: SPECIALIST

## 2020-01-22 PROCEDURE — 74011250636 HC RX REV CODE- 250/636: Performed by: NURSE ANESTHETIST, CERTIFIED REGISTERED

## 2020-01-22 PROCEDURE — 88305 TISSUE EXAM BY PATHOLOGIST: CPT

## 2020-01-22 PROCEDURE — 74011000250 HC RX REV CODE- 250: Performed by: NURSE ANESTHETIST, CERTIFIED REGISTERED

## 2020-01-22 PROCEDURE — 74011250636 HC RX REV CODE- 250/636: Performed by: SPECIALIST

## 2020-01-22 PROCEDURE — 77030021593 HC FCPS BIOP ENDOSC BSC -A: Performed by: SPECIALIST

## 2020-01-22 PROCEDURE — 76040000019: Performed by: SPECIALIST

## 2020-01-22 RX ORDER — LIDOCAINE HYDROCHLORIDE 20 MG/ML
INJECTION, SOLUTION EPIDURAL; INFILTRATION; INTRACAUDAL; PERINEURAL AS NEEDED
Status: DISCONTINUED | OUTPATIENT
Start: 2020-01-22 | End: 2020-01-22 | Stop reason: HOSPADM

## 2020-01-22 RX ORDER — DEXTROMETHORPHAN/PSEUDOEPHED 2.5-7.5/.8
1.2 DROPS ORAL
Status: DISCONTINUED | OUTPATIENT
Start: 2020-01-22 | End: 2020-01-22 | Stop reason: HOSPADM

## 2020-01-22 RX ORDER — FENTANYL CITRATE 50 UG/ML
25 INJECTION, SOLUTION INTRAMUSCULAR; INTRAVENOUS AS NEEDED
Status: DISCONTINUED | OUTPATIENT
Start: 2020-01-22 | End: 2020-01-22 | Stop reason: HOSPADM

## 2020-01-22 RX ORDER — PROPOFOL 10 MG/ML
INJECTION, EMULSION INTRAVENOUS AS NEEDED
Status: DISCONTINUED | OUTPATIENT
Start: 2020-01-22 | End: 2020-01-22 | Stop reason: HOSPADM

## 2020-01-22 RX ORDER — FLUMAZENIL 0.1 MG/ML
0.2 INJECTION INTRAVENOUS
Status: DISCONTINUED | OUTPATIENT
Start: 2020-01-22 | End: 2020-01-22 | Stop reason: HOSPADM

## 2020-01-22 RX ORDER — MIDAZOLAM HYDROCHLORIDE 1 MG/ML
.25-5 INJECTION, SOLUTION INTRAMUSCULAR; INTRAVENOUS AS NEEDED
Status: DISCONTINUED | OUTPATIENT
Start: 2020-01-22 | End: 2020-01-22 | Stop reason: HOSPADM

## 2020-01-22 RX ORDER — NALOXONE HYDROCHLORIDE 0.4 MG/ML
0.4 INJECTION, SOLUTION INTRAMUSCULAR; INTRAVENOUS; SUBCUTANEOUS
Status: DISCONTINUED | OUTPATIENT
Start: 2020-01-22 | End: 2020-01-22 | Stop reason: HOSPADM

## 2020-01-22 RX ORDER — PANTOPRAZOLE SODIUM 40 MG/1
40 TABLET, DELAYED RELEASE ORAL DAILY
COMMUNITY
End: 2021-05-20 | Stop reason: SDUPTHER

## 2020-01-22 RX ORDER — SODIUM CHLORIDE 9 MG/ML
50 INJECTION, SOLUTION INTRAVENOUS CONTINUOUS
Status: DISCONTINUED | OUTPATIENT
Start: 2020-01-22 | End: 2020-01-22 | Stop reason: HOSPADM

## 2020-01-22 RX ADMIN — PROPOFOL 50 MG: 10 INJECTION, EMULSION INTRAVENOUS at 13:31

## 2020-01-22 RX ADMIN — PROPOFOL 150 MG: 10 INJECTION, EMULSION INTRAVENOUS at 13:29

## 2020-01-22 RX ADMIN — SODIUM CHLORIDE 50 ML/HR: 900 INJECTION, SOLUTION INTRAVENOUS at 12:10

## 2020-01-22 RX ADMIN — PROPOFOL 50 MG: 10 INJECTION, EMULSION INTRAVENOUS at 13:33

## 2020-01-22 RX ADMIN — LIDOCAINE HYDROCHLORIDE 50 MG: 20 INJECTION, SOLUTION INTRAVENOUS at 13:29

## 2020-01-22 NOTE — ROUTINE PROCESS
Sebastian Recinos  1945  [id number]    Situation:  Verbal report received from: Larry Martínez  Procedure: Procedure(s):  ESOPHAGOGASTRODUODENOSCOPY (EGD)  ESOPHAGOGASTRODUODENAL (EGD) BIOPSY    Background:    Preoperative diagnosis: GERD  Postoperative diagnosis: Hiatal hernia, Gastritis    :  Dr. Perry Henriquez  Assistant(s): Endoscopy Technician-1: Clearnce Channel  Endoscopy RN-1: Hermila Gallardo RN    Specimens:   ID Type Source Tests Collected by Time Destination   1 : Biopsy Preservative Gastric  Ramona Kim MD 1/22/2020 1333 Pathology     H. Pylori  no    Assessment:  Intra-procedure medications     Anesthesia gave intra-procedure sedation and medications, see anesthesia flow sheet yes    Intravenous fluids: NS@ KVO     Vital signs stable     Abdominal assessment: round and soft     Recommendation:  Discharge patient per MD order.   Family or Friend   Permission to share finding with family or friend yes

## 2020-01-22 NOTE — ANESTHESIA PREPROCEDURE EVALUATION
Relevant Problems   No relevant active problems       Anesthetic History   No history of anesthetic complications            Review of Systems / Medical History  Patient summary reviewed, nursing notes reviewed and pertinent labs reviewed    Pulmonary  Within defined limits                 Neuro/Psych             Comments: MEMORY CHANGES Cardiovascular    Hypertension                   GI/Hepatic/Renal     GERD           Endo/Other        Obesity     Other Findings   Comments: POLYNEUROPATHY           Physical Exam    Airway  Mallampati: III  TM Distance: 4 - 6 cm  Neck ROM: normal range of motion   Mouth opening: Normal     Cardiovascular    Rhythm: regular  Rate: normal         Dental  No notable dental hx       Pulmonary  Breath sounds clear to auscultation               Abdominal  GI exam deferred       Other Findings            Anesthetic Plan    ASA: 3  Anesthesia type: MAC          Induction: Intravenous  Anesthetic plan and risks discussed with: Patient

## 2020-01-22 NOTE — PROCEDURES
[address], [place]  [phone]      2020    Esophagogastroduodenoscopy (EGD) Procedure Note  Mitchel Lang  : 1945  [facility] Medical Record Number: [id number]      Indications:    GERD  Referring Physician:  Padmini Garcia MD  Anesthesia/Sedation:  Conscious sedation/deep sedation/monitored anesthesia -- see notes. Endoscopist:  Dr. Yin Hartley  Complications:  None  Estimated Blood Loss:  None    Permit:  The indications, risks, benefits and alternatives were reviewed with the patient or their decision maker who was provided an opportunity to ask questions and all questions were answered. The specific risks of esophagogastroduodenoscopy with conscious sedation were reviewed, including but not limited to anesthetic complication, bleeding, adverse drug reaction, missed lesion, infection, IV site reactions, and intestinal perforation which would lead to the need for surgical repair. Alternatives to EGD including radiographic imaging, observation without testing, or laboratory testing were reviewed as well as the limitations of those alternatives discussed. After considering the options and having all their questions answered, the patient or their decision maker provided both verbal and written consent to proceed. Procedure in Detail:  After obtaining informed consent, positioning of the patient in the left lateral decubitus position, and conduction of a pre-procedure pause or \"time out\" the endoscope was introduced into the mouth and advanced to the duodenum. A careful inspection was made, and findings or interventions are described below. Findings:   Esophagus:LA class C erosive esophagitis at EG junction, biopsied with cold forceps. Medium sized hiatus hernia present.   Stomach: normal   Duodenum/jejunum: normal      Specimens: See above    Impression: Hiatus hernia and esophagitis. Recommendations:  -Acid suppression with a proton pump inhibitor. , -Await pathology. , -GERD diet: avoid fried and fatty foods. peppermint, chocolate, alcohol, coffee, citrus fruits and juices, tomoato products; avoid lying down for 2 to 3 hours after eating. Thank you for entrusting me with this patient's care. Please do not hesitate to contact me with any questions or if I can be of assistance with any of your other patients' GI needs. Signed By: Brenda Perez MD                        January 22, 2020     Surgical assistant none. Implants none unless specified.

## 2020-01-22 NOTE — PERIOP NOTES
1329: Anesthesia staff at patient's bedside administering anesthesia and monitoring patients vital signs throughout procedure. See anesthesia note. 1335: Patient tolerated procedure without problems. Abdomen soft and patient arousable and voices no complaints Report received from CRNA, see anesthesia note. Patient transported to endoscopy recovery area. Endoscope was pre-cleaned at bedside immediately following procedure by Brock Forrester Endo Tech.      1339: Report given to recovery nurse Pepe Nelson RN

## 2020-01-22 NOTE — H&P
Date: 2019 3:30 PM   Patient Name: Yamilka Matos   Account #: [id number]    Gender: Male    (age): 1945 (69)       Provider:     Eduardo Aldana. Miquel Esquivel MD        Referring Physician:     Joyce Ordonez MD  [address], [place], [address] [facility]  [phone] (phone)  [phone] (fax)        Chief Complaint: GERD is out of control. History of Present Illness:   17-year-old male with history of neuropathy and Charcot-Lucero-Tooth disease has previously noted good control over his GERD symptoms with 1 over-the-counter Prilosec pill taken in the morning daily. However, over the last several months he has noted recurrent symptoms of burning discomfort in the epigastrium rising to the throat. He denies any spleen weight loss, blood in stool, vomiting, or dysphagia. Today we negotiated increase in dose and medication and upper GI endoscopy as he has developed symptoms despite medication which worked for him in the past.? 17-year-old male with history of neuropathy and Charcot-Lucero-Tooth disease has previously noted good control over his GERD symptoms with 1 over-the-counter Prilosec pill taken in the morning daily. However, over the last several months he has noted recurrent symptoms of burning discomfort in the epigastrium rising to the throat. He denies any spleen weight loss, blood in stool, vomiting, or dysphagia. Today we negotiated increase in dose and medication and upper GI endoscopy as he has developed symptoms despite medication which worked for him in the past.? Past Medical History      Medical Conditions: Acid Reflux   Surgical Procedures:  Foot Surgery, NOT SURE   Dx Studies: Colonoscopy, 2018  CT Scan, 2017  Pre-Procedure Call, 2014   Medications: omeprazole 20 mg Take 1 capsule by mouth once a day as directed   Allergies: Patient has no known drug allergies   Immunizations: Influenza, seasonal, injectable, 10/1/2019      Social History Alcohol: Alcohol consumption: Weekly. socially. Tobacco: Never smoker   Drugs: None   Exercise: Exercise less than 3 times a week. Caffeine: Daily. Marital Status:          Occupation:               Family History No history of Colon Cancer, Colon Polyps, Esophogeal Cancer, GI Cancers, IBD (Crohn's or UC), Liver disease        Review of Systems:   Cardiovascular: Denies chest pain, irregular heart beat, palpitations, peripheral edema, syncope, Sweats. Constitutional: Denies fatigue, fever, loss of appetite, weight gain, weight loss. ENMT: Denies nose bleeds, sore throat, hearing loss. Endocrine: Denies excessive thirst, heat intolerance. Eyes: Denies loss of vision. Gastrointestinal: Presents suffers from heartburn. Denies abdominal pain, abdominal swelling, change in bowel habits, constipation, diarrhea, Bloating/gas, jaundice, nausea, rectal bleeding, stomach cramps, vomiting, dysphagia, rectal pain, Stool incontinence, hematemesis. Genitourinary: Denies dark urine, dysuria, frequent urination, hematuria, incontinence. Hematologic/Lymphatic: Denies easy bruising, prolonged bleeding. Integumentary: Denies itching, rashes, sun sensitivity. Musculoskeletal: Presents suffers from joint pain. Denies arthritis, back pain, gout, muscle weakness, stiffness. Neurological: Presents suffers from memory loss. Denies dizziness, fainting, frequent headaches. Psychiatric: Denies anxiety, depression, difficulty sleeping, hallucinations, nervousness, panic attacks, paranoia. Respiratory: Denies cough, dyspnea, wheezing. Vital Signs:   BP  (mmHg)  Pulse  (ppm) Weight (lbs/oz) Height (ft/in) BMI Temp   171/104 87 220 /  5 / 8 33.45 97 (F)      Physical Exam:   Constitutional:      Appearance: No distress, appears comfortable. Communication: Understands/receives spoken information. Skin:      Inspection: No rash, no jaundice. Head/face: Inspection: Normacephalic, atraumatic.    Eyes: Conjunctivae/lids: Normal.   Pupils/irises: Pupils equal, round and normal.   ENMT:      External: Normal.   Hearing: Normal.   Neck:      Neck: Normal appearance, trachea midline. Gastrointestinal/Abdomen:      Abdomen: non-distended, nontender. Liver/Spleen: normal, normal size, Liver size and consistency normal, spleen is non-palpable. Musculoskeletal:      Gait/station: normal.   Digits/nails: Normal, no spooning of nails, clubbing, or splinter hemorrhages, no clubbing, cyanosis, petechiae or other inflammatory conditions. Psychiatric:      Judgment/insight: Normal, normal judgement, normal insight. Orientation: oriented to time, space and person. Lab Results: No Electronic Results      Impressions: Gastroesophageal reflux disease? ?Gastroesophageal reflux disease? Assessment: ?         Plan: Upper Endoscopy  pantoprazole 40 mg Take 1 tablet by mouth once a day before breakfast  REMINDER: if pantoprazole is too expensive, begin taking 2 (two) over-the-counter omeprazole 20mg pills 30-60 minutes before breakfast each day. ? ? Upper Endoscopy? ?  ? ?pantoprazole? ?40 mg? ? Take 1 tablet by mouth once a day before breakfast? ? ?  ? ?REMINDER: if pantoprazole is too expensive, begin taking 2 (two) over-the-counter omeprazole 20mg pills 30-60 minutes before breakfast each day. ? Risk & Medical Necessity: The patient requires Moderate to High Severity care for this visit. Diagnosis and management options are Minimal. The amount of data reviewed and/or ordered is Minimal/None. The level of risk is Minimal.           Notes:              Rosie Friend. Jacobo Mcneil MD     Electronically signed on 2019 3:49:27 PM by Rosie Friend.  Whitney Ely, MRN [id number],  1945 First Visit,  New     Modify          Delete     Delete all     Edit Wording          Sign     [email or web address]_Content

## 2020-01-22 NOTE — INTERVAL H&P NOTE
Pre-Endoscopy H&P Update  Chief complaint/HPI/ROS:  The indication for the procedure, the patient's history and the patient's current medications are reviewed prior to the procedure and that data is reported on the H&P to which this document is attached. Any significant complaints with regard to organ systems will be noted, and if not mentioned then a review of systems is not contributory. Past Medical History:   Diagnosis Date    Arthritis     cervical spine    Calculus of kidney     Charcot-Lucero-Tooth disease     type 2    GERD (gastroesophageal reflux disease)     Hypercholesterolemia     Memory change     Peripheral sensory-motor axonal polyneuropathy     Skin cancer     SCC on chest    Sun-damaged skin     Sunburn, blistering     Tinnitus       Past Surgical History:   Procedure Laterality Date    HX ORTHOPAEDIC      R grt toe surgery     Social   Social History     Tobacco Use    Smoking status: Former Smoker     Types: Cigars     Last attempt to quit: 1997     Years since quittin.2    Smokeless tobacco: Never Used   Substance Use Topics    Alcohol use: Yes     Alcohol/week: 7.0 standard drinks     Types: 7 Glasses of wine per week     Comment: 1 glass of wine daily       Family History   Problem Relation Age of Onset    Heart Disease Mother     Diabetes Maternal Uncle     Dementia Maternal Grandmother     Diabetes Other     Cancer Neg Hx     Hypertension Neg Hx       No Known Allergies   Prior to Admission Medications   Prescriptions Last Dose Informant Patient Reported? Taking? ASPIRIN (ASPIR-81 PO) 2020 at Unknown time  Yes Yes   Sig: Take 81 mg by mouth daily. lisinopril-hydroCHLOROthiazide (PRINZIDE, ZESTORETIC) 10-12.5 mg per tablet 2020 at Unknown time  No Yes   Sig: Take 1 Tab by mouth two (2) times a day. pantoprazole (PROTONIX) 40 mg tablet 2020 at 1200  Yes Yes   Sig: Take 40 mg by mouth daily.    psyllium (METAMUCIL) powd   Yes Yes   Sig: Take by mouth daily. Facility-Administered Medications: None       PHYSICAL EXAM:  The patient is examined immediately prior to the procedure. Visit Vitals  BP (!) 143/91   Pulse 64   Temp 97.9 °F (36.6 °C)   Resp 15   Ht 5' 9\" (1.753 m)   Wt 100.7 kg (222 lb 0.1 oz)   SpO2 99%   BMI 32.78 kg/m²     Gen: Appears comfortable, no distress. Pulm: comfortable respirations with no abnormal audible breath sounds  HEART: well perfused, no abnormal audible heart sounds  GI: abdomen flat. PLAN:  Informed consent discussion held, patient afforded an opportunity to ask questions and all questions answered. After being advised of the risks, benefits, and alternatives, the patient requested that we proceed and indicated so on a written consent form. Will proceed with procedure as planned.   Elizabeth Degroot MD

## 2020-01-22 NOTE — DISCHARGE INSTRUCTIONS
[facility] LANDRY Fisher MD  [phone]      January 22, 2020     Brenda Mcadams  YOB: 1945    ENDOSCOPY DISCHARGE INSTRUCTIONS    If there is redness at IV site you should apply warm compress to area. If redness or soreness persist contact Dr. Aleena Fisher' or your primary care doctor. Gaseous discomfort may develop, but walking, belching will help relieve this. You may not operate a vehicle for 12 hours  You may not operate machinery or dangerous appliances for rest of today  You may not drink alcoholic beverages for 12 hours  Avoid making any critical decisions for 24 hours    DIET:  You may resume your normal diet, but some patients find that heavy or large meals may lead to indigestion or vomiting. I suggest a light meal as first food intake. MEDICATIONS:  The use of some over-the-counter pain medication may lead to bleeding after biopsies or other procedures you may have had done. Tylenol (also called acetaminophen) is safe to take and will not lead to bleeding. Based on the procedure you had today you may not safely take aspirin or aspirin-like products for the next ten (10) days. ACTIVITY:  You may resume your normal household activities, but it is recommended that you spend the remainder of the day resting -  avoid any strenuous activity. CALL DR. Chasity Damon' OFFICE IF:  Increasing pain, nausea, vomiting  Abdominal distension (swelling)  Significant new or increased bleeding (oral or rectal)  Fever/Chills  Chest pain/shortness of breath                   Additional instructions:   Hold aspirin 10 days  We found severe acid inflammation of the lower esophagus. Continue pantoprazole in the morning before breakfast.     It was an honor to be your doctor today. Please let me or my office staff know if you have any feedback about today's procedure.     Yeimi Michaels MD

## 2020-01-22 NOTE — INTERVAL H&P NOTE
Pre-Endoscopy H&P Update  Chief complaint/HPI/ROS:  The indication for the procedure, the patient's history and the patient's current medications are reviewed prior to the procedure and that data is reported on the H&P to which this document is attached. Any significant complaints with regard to organ systems will be noted, and if not mentioned then a review of systems is not contributory. Past Medical History:   Diagnosis Date    Arthritis     cervical spine    Calculus of kidney     Charcot-Lucero-Tooth disease     type 2    GERD (gastroesophageal reflux disease)     Hypercholesterolemia     Memory change     Peripheral sensory-motor axonal polyneuropathy     Skin cancer     SCC on chest    Sun-damaged skin     Sunburn, blistering     Tinnitus       Past Surgical History:   Procedure Laterality Date    HX ORTHOPAEDIC      R grt toe surgery     Social   Social History     Tobacco Use    Smoking status: Former Smoker     Types: Cigars     Last attempt to quit: 1997     Years since quittin.2    Smokeless tobacco: Never Used   Substance Use Topics    Alcohol use: Yes     Alcohol/week: 7.0 standard drinks     Types: 7 Glasses of wine per week     Comment: 1 glass of wine daily       Family History   Problem Relation Age of Onset    Heart Disease Mother     Diabetes Maternal Uncle     Dementia Maternal Grandmother     Diabetes Other     Cancer Neg Hx     Hypertension Neg Hx       No Known Allergies   Prior to Admission Medications   Prescriptions Last Dose Informant Patient Reported? Taking? ASPIRIN (ASPIR-81 PO) 2020 at Unknown time  Yes Yes   Sig: Take 81 mg by mouth daily. lisinopril-hydroCHLOROthiazide (PRINZIDE, ZESTORETIC) 10-12.5 mg per tablet 2020 at Unknown time  No Yes   Sig: Take 1 Tab by mouth two (2) times a day. pantoprazole (PROTONIX) 40 mg tablet 2020 at 1200  Yes Yes   Sig: Take 40 mg by mouth daily.    psyllium (METAMUCIL) powd   Yes Yes   Sig: Take by mouth daily. Facility-Administered Medications: None       PHYSICAL EXAM:  The patient is examined immediately prior to the procedure. Visit Vitals  BP (!) 143/91   Pulse 64   Temp 97.9 °F (36.6 °C)   Resp 15   Ht 5' 9\" (1.753 m)   Wt 100.7 kg (222 lb 0.1 oz)   SpO2 99%   BMI 32.78 kg/m²     Gen: Appears comfortable, no distress. Pulm: comfortable respirations with no abnormal audible breath sounds  HEART: well perfused, no abnormal audible heart sounds  GI: abdomen flat. PLAN:  Informed consent discussion held, patient afforded an opportunity to ask questions and all questions answered. After being advised of the risks, benefits, and alternatives, the patient requested that we proceed and indicated so on a written consent form. Will proceed with procedure as planned.   Trent Rader MD

## 2020-02-25 ENCOUNTER — OFFICE VISIT (OUTPATIENT)
Dept: INTERNAL MEDICINE CLINIC | Age: 75
End: 2020-02-25

## 2020-02-25 VITALS
SYSTOLIC BLOOD PRESSURE: 133 MMHG | TEMPERATURE: 97.6 F | HEART RATE: 113 BPM | RESPIRATION RATE: 20 BRPM | HEIGHT: 69 IN | DIASTOLIC BLOOD PRESSURE: 87 MMHG | BODY MASS INDEX: 33.03 KG/M2 | WEIGHT: 223 LBS | OXYGEN SATURATION: 99 %

## 2020-02-25 DIAGNOSIS — I10 ESSENTIAL HYPERTENSION: Primary | ICD-10-CM

## 2020-02-25 DIAGNOSIS — G60.0 CMT (CHARCOT-MARIE-TOOTH DISEASE): ICD-10-CM

## 2020-02-25 DIAGNOSIS — K21.9 GASTROESOPHAGEAL REFLUX DISEASE WITHOUT ESOPHAGITIS: ICD-10-CM

## 2020-02-25 PROBLEM — E78.9 BORDERLINE HIGH SERUM CHOLESTEROL: Status: ACTIVE | Noted: 2020-02-25

## 2020-02-25 NOTE — PROGRESS NOTES
HISTORY OF PRESENT ILLNESS  Ranulfo Barrientos is a 69 y.o. male. HPI  Hypertension ROS: taking medications as instructed, no medication side effects noted, no TIA's, no chest pain on exertion, no dyspnea on exertion, no swelling of ankles. New concerns: BP in office today is 133/87. Pt started taking his BP meds BID. GERD: Stable, pt continues to comply with Protonix 40 mg. He had an endoscopy and they found a hiatal hernia and esophagitis. He was advised to follow a specific diet. He notes he does not stick to the diet well. CMT: Stable and well-managed with no noted flares or issues. Pt got lost today and went to the wrong building. He goes out 2-3x a week to [employer] to buy 'scratcher's'. . His wife does the grocery shopping and bills. His daughter recently received her doctorate in nursing. Review of Systems   All other systems reviewed and are negative. Physical Exam  Constitutional:       Appearance: Normal appearance. He is diaphoretic. HENT:      Right Ear: Hearing, tympanic membrane and external ear normal.      Left Ear: Hearing, tympanic membrane and external ear normal.      Mouth/Throat:      Mouth: Mucous membranes are moist.      Pharynx: Oropharynx is clear. Cardiovascular:      Rate and Rhythm: Normal rate and regular rhythm. Pulmonary:      Effort: Pulmonary effort is normal.      Breath sounds: Normal breath sounds and air entry. Musculoskeletal: Normal range of motion. Skin:     General: Skin is warm. Neurological:      General: No focal deficit present. Mental Status: He is alert and oriented to person, place, and time. Psychiatric:         Mood and Affect: Mood normal.         Behavior: Behavior normal.         ASSESSMENT and PLAN  Diagnoses and all orders for this visit:    1. Essential hypertension  BP is at goal since he started complying with meds BID. I do not recommend any change in medications.      2. CMT (Charcot-Lucero-Tooth disease)  Stable and well-managed with no noted issues. Will continue to monitor for improvements or changes. 3. Gastroesophageal reflux disease without esophagitis  Stable and well-managed with meds and recent endoscopy. Continues to f/u with GI. No change in medications. Lab results and schedule of future lab studies reviewed with patient. Reviewed diet, exercise and weight control. Written by Jarad Camacho, as dictated by Radha Alejandre MD.     Current diagnosis and concerns discussed with pt at length. Understands risks and benefits or current treatment plan and medications and accepts the treatment and medication with any possible risks. Pt asks appropriate questions which were answered. Pt instructed to call with any concerns or problems. This note will not be viewable in [address]

## 2020-03-04 DIAGNOSIS — I10 ESSENTIAL HYPERTENSION: ICD-10-CM

## 2020-03-04 RX ORDER — LISINOPRIL AND HYDROCHLOROTHIAZIDE 10; 12.5 MG/1; MG/1
TABLET ORAL
Qty: 60 TAB | Refills: 2 | Status: SHIPPED | OUTPATIENT
Start: 2020-03-04 | End: 2020-10-08 | Stop reason: SDUPTHER

## 2020-10-02 DIAGNOSIS — I10 ESSENTIAL HYPERTENSION: ICD-10-CM

## 2020-10-03 RX ORDER — LISINOPRIL AND HYDROCHLOROTHIAZIDE 10; 12.5 MG/1; MG/1
TABLET ORAL
Qty: 60 TAB | Refills: 2 | OUTPATIENT
Start: 2020-10-03

## 2020-10-05 NOTE — TELEPHONE ENCOUNTER
----- Message from Hunter Mcmullen sent at 10/5/2020 10:14 AM EDT -----  Regarding: Dr Romero/ refill  Caller's first and last name and relationship (if not the patient):  Best contact number(s): [phone]  What are the symptoms: elevated bp. Pt states he has been off of his medication for about 3 days. Transfer successful - yes/no (include outcome): no   Transfer declined - yes/no (include reason): no  Was caller advised to seek appropriate level of care - yes/no: yes  Details to clarify the request: Pt. is requesting Lisinopril - hydrochlorothiazide medication to be called into his [address] [place] [address] [place] ([phone]) that's on file.

## 2020-10-08 ENCOUNTER — OFFICE VISIT (OUTPATIENT)
Dept: INTERNAL MEDICINE CLINIC | Age: 75
End: 2020-10-08
Payer: MEDICARE

## 2020-10-08 VITALS
WEIGHT: 222 LBS | TEMPERATURE: 97.9 F | BODY MASS INDEX: 32.78 KG/M2 | HEART RATE: 82 BPM | DIASTOLIC BLOOD PRESSURE: 85 MMHG | SYSTOLIC BLOOD PRESSURE: 122 MMHG | OXYGEN SATURATION: 97 % | RESPIRATION RATE: 20 BRPM

## 2020-10-08 DIAGNOSIS — G60.0 CMT (CHARCOT-MARIE-TOOTH DISEASE): ICD-10-CM

## 2020-10-08 DIAGNOSIS — G60.3 IDIOPATHIC PROGRESSIVE POLYNEUROPATHY: ICD-10-CM

## 2020-10-08 DIAGNOSIS — I10 ESSENTIAL HYPERTENSION: ICD-10-CM

## 2020-10-08 DIAGNOSIS — Z00.00 MEDICARE ANNUAL WELLNESS VISIT, SUBSEQUENT: Primary | ICD-10-CM

## 2020-10-08 LAB
ALBUMIN SERPL-MCNC: 4 G/DL (ref 3.5–5)
ALBUMIN/GLOB SERPL: 1.3 {RATIO} (ref 1.1–2.2)
ALP SERPL-CCNC: 63 U/L (ref 45–117)
ALT SERPL-CCNC: 28 U/L (ref 12–78)
ANION GAP SERPL CALC-SCNC: 6 MMOL/L (ref 5–15)
AST SERPL-CCNC: 23 U/L (ref 15–37)
BILIRUB SERPL-MCNC: 0.4 MG/DL (ref 0.2–1)
BUN SERPL-MCNC: 10 MG/DL (ref 6–20)
BUN/CREAT SERPL: 11 (ref 12–20)
CALCIUM SERPL-MCNC: 9.9 MG/DL (ref 8.5–10.1)
CHLORIDE SERPL-SCNC: 103 MMOL/L (ref 97–108)
CHOLEST SERPL-MCNC: 230 MG/DL
CO2 SERPL-SCNC: 25 MMOL/L (ref 21–32)
CREAT SERPL-MCNC: 0.95 MG/DL (ref 0.7–1.3)
ERYTHROCYTE [DISTWIDTH] IN BLOOD BY AUTOMATED COUNT: 12.6 % (ref 11.5–14.5)
GLOBULIN SER CALC-MCNC: 3 G/DL (ref 2–4)
GLUCOSE SERPL-MCNC: 101 MG/DL (ref 65–100)
HCT VFR BLD AUTO: 46.8 % (ref 36.6–50.3)
HDLC SERPL-MCNC: 50 MG/DL
HDLC SERPL: 4.6 {RATIO} (ref 0–5)
HGB BLD-MCNC: 15.7 G/DL (ref 12.1–17)
LDLC SERPL CALC-MCNC: 148 MG/DL (ref 0–100)
LIPID PROFILE,FLP: ABNORMAL
MCH RBC QN AUTO: 30.3 PG (ref 26–34)
MCHC RBC AUTO-ENTMCNC: 33.5 G/DL (ref 30–36.5)
MCV RBC AUTO: 90.2 FL (ref 80–99)
NRBC # BLD: 0 K/UL (ref 0–0.01)
NRBC BLD-RTO: 0 PER 100 WBC
PLATELET # BLD AUTO: 285 K/UL (ref 150–400)
PMV BLD AUTO: 10.6 FL (ref 8.9–12.9)
POTASSIUM SERPL-SCNC: 4.2 MMOL/L (ref 3.5–5.1)
PROT SERPL-MCNC: 7 G/DL (ref 6.4–8.2)
PSA SERPL-MCNC: 2.5 NG/ML (ref 0.01–4)
RBC # BLD AUTO: 5.19 M/UL (ref 4.1–5.7)
SODIUM SERPL-SCNC: 134 MMOL/L (ref 136–145)
TRIGL SERPL-MCNC: 160 MG/DL (ref ?–150)
VLDLC SERPL CALC-MCNC: 32 MG/DL
WBC # BLD AUTO: 9.3 K/UL (ref 4.1–11.1)

## 2020-10-08 PROCEDURE — G8536 NO DOC ELDER MAL SCRN: HCPCS | Performed by: INTERNAL MEDICINE

## 2020-10-08 PROCEDURE — G8432 DEP SCR NOT DOC, RNG: HCPCS | Performed by: INTERNAL MEDICINE

## 2020-10-08 PROCEDURE — 3017F COLORECTAL CA SCREEN DOC REV: CPT | Performed by: INTERNAL MEDICINE

## 2020-10-08 PROCEDURE — G8427 DOCREV CUR MEDS BY ELIG CLIN: HCPCS | Performed by: INTERNAL MEDICINE

## 2020-10-08 PROCEDURE — G8752 SYS BP LESS 140: HCPCS | Performed by: INTERNAL MEDICINE

## 2020-10-08 PROCEDURE — G8754 DIAS BP LESS 90: HCPCS | Performed by: INTERNAL MEDICINE

## 2020-10-08 PROCEDURE — G0439 PPPS, SUBSEQ VISIT: HCPCS | Performed by: INTERNAL MEDICINE

## 2020-10-08 PROCEDURE — G8419 CALC BMI OUT NRM PARAM NOF/U: HCPCS | Performed by: INTERNAL MEDICINE

## 2020-10-08 PROCEDURE — 99214 OFFICE O/P EST MOD 30 MIN: CPT | Performed by: INTERNAL MEDICINE

## 2020-10-08 PROCEDURE — 1101F PT FALLS ASSESS-DOCD LE1/YR: CPT | Performed by: INTERNAL MEDICINE

## 2020-10-08 RX ORDER — LISINOPRIL AND HYDROCHLOROTHIAZIDE 10; 12.5 MG/1; MG/1
1 TABLET ORAL 2 TIMES DAILY
Qty: 60 TAB | Refills: 5 | Status: SHIPPED | OUTPATIENT
Start: 2020-10-08 | End: 2021-05-20 | Stop reason: SDUPTHER

## 2020-10-08 NOTE — PROGRESS NOTES
HISTORY OF PRESENT ILLNESS  Kell Members is a 70 y.o. male. HPI  Hypertension ROS: taking medications as instructed, no medication side effects noted, no TIA's, no chest pain on exertion, no dyspnea on exertion, no swelling of ankles. New concerns: BP in office today is 161/83. Pt reports that he does not monitor his BP at home. Pt notes that he has run out of his medication. Continues on lisinopril-HCTZ. Pt states that he is going to start logging his calories again to lose some weight. Idiopathic progressive polyneuropathy: Pt notes that he is experiencing neuropathy in his feet. Pt reports that he has had neuropathy for 5 years. CMT: Stable with no reported flares. Bowels: Stable, pt continues to comply with Metamucil UID. Pt notes that he tries walking around his house to help with mobility. He reports that he has issues with balance. Review of Systems   All other systems reviewed and are negative. Physical Exam  Vitals signs and nursing note reviewed. Constitutional:       Appearance: Normal appearance. He is normal weight. HENT:      Head: Normocephalic and atraumatic. Right Ear: Tympanic membrane, ear canal and external ear normal.      Left Ear: Tympanic membrane, ear canal and external ear normal.      Nose: Nose normal.      Mouth/Throat:      Mouth: Mucous membranes are dry. Pharynx: Oropharynx is clear. Neck:      Musculoskeletal: Normal range of motion and neck supple. Cardiovascular:      Rate and Rhythm: Normal rate and regular rhythm. Pulses: Normal pulses. Heart sounds: Normal heart sounds. Pulmonary:      Effort: Pulmonary effort is normal.      Breath sounds: Normal breath sounds. Abdominal:      General: Abdomen is flat. Palpations: Abdomen is soft. Musculoskeletal: Normal range of motion. Comments: Difficulty ambulating due to neuropathy and decreased balance   Skin:     General: Skin is warm and dry.    Neurological: General: No focal deficit present. Mental Status: He is alert and oriented to person, place, and time. Mental status is at baseline. Psychiatric:         Mood and Affect: Mood normal.         Behavior: Behavior normal.         Thought Content: Thought content normal.         Judgment: Judgment normal.         ASSESSMENT and PLAN  Diagnoses and all orders for this visit:    1. Essential hypertension  Pt's BP is stable at home. Refilled pt's medication. Will continue to monitor for improvements or changes. -     lisinopril-hydroCHLOROthiazide (PRINZIDE, ZESTORETIC) 10-12.5 mg per tablet; Take 1 Tab by mouth two (2) times a day. 2. CMT (Charcot-Lucero-Tooth disease)  Stable and well-managed. No change in treatment plan. 3. Idiopathic progressive polyneuropathy  Stable and well-managed with some pain noted. Lab results and schedule of future lab studies reviewed with patient. Reviewed diet, exercise and weight control. Written by Eber Kenny, as dictated by Yovana Chandler MD.     Current diagnosis and concerns discussed with pt at length. Understands risks and benefits or current treatment plan and medications and accepts the treatment and medication with any possible risks. Pt asks appropriate questions which were answered. Pt instructed to call with any concerns or problems.

## 2020-10-08 NOTE — PROGRESS NOTES
This is the Subsequent Medicare Annual Wellness Exam, performed 12 months or more after the Initial AWV or the last Subsequent AWV    I have reviewed the patient's medical history in detail and updated the computerized patient record. History     Patient Active Problem List   Diagnosis Code    GERD (gastroesophageal reflux disease) K21.9    Kidney stones N20.0    Squamous cell skin cancer C44.92    Prediabetes R73.03    Fracture, cervical vertebra (HCC) S12. 9XXA    Brachial neuritis or radiculitis NOS M54.12    Thoracic or lumbosacral neuritis or radiculitis, unspecified [id number]    Idiopathic progressive polyneuropathy G60.3    CMT (Charcot-Lucero-Tooth disease) G60.0    Advanced care planning/counseling discussion Z71.89    Hyperlipidemia LDL goal <100 E78.5    Elevated glucose R73.09    Memory change R41.3    Borderline high serum cholesterol E78.9     Past Medical History:   Diagnosis Date    Arthritis     cervical spine    Calculus of kidney     Charcot-Lucero-Tooth disease     type 2    GERD (gastroesophageal reflux disease)     Hypercholesterolemia     Memory change     Peripheral sensory-motor axonal polyneuropathy     Skin cancer     SCC on chest    Sun-damaged skin     Sunburn, blistering     Tinnitus       Past Surgical History:   Procedure Laterality Date    HX ORTHOPAEDIC      R grt toe surgery     Current Outpatient Medications   Medication Sig Dispense Refill    lisinopril-hydroCHLOROthiazide (PRINZIDE, ZESTORETIC) 10-12.5 mg per tablet Take 1 Tab by mouth two (2) times a day. 60 Tab 5    pantoprazole (PROTONIX) 40 mg tablet Take 40 mg by mouth daily.  psyllium (METAMUCIL) powd Take  by mouth daily.        No Known Allergies    Family History   Problem Relation Age of Onset    Heart Disease Mother     Diabetes Maternal Uncle     Dementia Maternal Grandmother     Diabetes Other     Cancer Neg Hx     Hypertension Neg Hx      Social History     Tobacco Use    Smoking status: Former Smoker     Packs/day: 0.00     Years: 2.00     Pack years: 0.00     Types: Cigars     Last attempt to quit: 1997     Years since quittin.9    Smokeless tobacco: Never Used   Substance Use Topics    Alcohol use: Yes     Alcohol/week: 7.0 standard drinks     Types: 7 Glasses of wine per week     Comment: 1 glass of wine daily        Depression Risk Factor Screening:     3 most recent PHQ Screens 2020   Little interest or pleasure in doing things Not at all   Feeling down, depressed, irritable, or hopeless Not at all   Total Score PHQ 2 0   Trouble falling or staying asleep, or sleeping too much -   Feeling tired or having little energy -   Poor appetite, weight loss, or overeating -   Feeling bad about yourself - or that you are a failure or have let yourself or your family down -   Trouble concentrating on things such as school, work, reading, or watching TV -   Moving or speaking so slowly that other people could have noticed; or the opposite being so fidgety that others notice -   Thoughts of being better off dead, or hurting yourself in some way -   PHQ 9 Score -       Alcohol Risk Screen   Do you average more than 1 drink per night or more than 7 drinks a week: No    In the past three months have you have had more than 4 drinks containing alcohol on one occasion: No        Functional Ability and Level of Safety:   Hearing: Hearing is good. Activities of Daily Living: The home contains: handrails and grab bars  Patient does total self care     Ambulation: with a cane     Fall Risk:  Fall Risk Assessment, last 12 mths 2019   Able to walk? Yes   Fall in past 12 months?  No   Fall with injury? -   Number of falls in past 12 months -   Fall Risk Score -     Abuse Screen:  Patient is not abused       Cognitive Screening   Has your family/caregiver stated any concerns about your memory: no     Cognitive Screening: Normal - MMSE (Mini Mental Status Exam)    Patient Care Team   Patient Care Team:  Blanca Mccollum MD as PCP - General (Internal Medicine)  Blanca Mccollum MD as PCP - [facility] Empaneled Provider    Assessment/Plan   Education and counseling provided:  Are appropriate based on today's review and evaluation    Diagnoses and all orders for this visit:    1. Essential hypertension  -     lisinopril-hydroCHLOROthiazide (PRINZIDE, ZESTORETIC) 10-12.5 mg per tablet; Take 1 Tab by mouth two (2) times a day. 2. CMT (Charcot-Lucero-Tooth disease)    3.  Idiopathic progressive polyneuropathy        Health Maintenance Due   Topic Date Due    DTaP/Tdap/Td series (1 - Tdap) 05/06/1968    GLAUCOMA SCREENING Q2Y  06/18/2020    Medicare Yearly Exam  08/08/2020

## 2020-10-08 NOTE — PATIENT INSTRUCTIONS
Medicare Wellness Visit, Male The best way to live healthy is to have a lifestyle where you eat a well-balanced diet, exercise regularly, limit alcohol use, and quit all forms of tobacco/nicotine, if applicable. Regular preventive services are another way to keep healthy. Preventive services (vaccines, screening tests, monitoring & exams) can help personalize your care plan, which helps you manage your own care. Screening tests can find health problems at the earliest stages, when they are easiest to treat. [facility] follows the current, evidence-based guidelines published by the [facility] ([facility]) when recommending preventive services for our patients. Because we follow these guidelines, sometimes recommendations change over time as research supports it. (For example, a prostate screening blood test is no longer routinely recommended for men with no symptoms). Of course, you and your doctor may decide to screen more often for some diseases, based on your risk and co-morbidities (chronic disease you are already diagnosed with). Preventive services for you include: - Medicare offers their members a free annual wellness visit, which is time for you and your primary care provider to discuss and plan for your preventive service needs. Take advantage of this benefit every year! 
-All adults over age 72 should receive the recommended pneumonia vaccines. Current USPSTF guidelines recommend a series of two vaccines for the best pneumonia protection.  
-All adults should have a flu vaccine yearly and tetanus vaccine every 10 years. 
-All adults age 48 and older should receive the shingles vaccines (series of two vaccines).       
-All adults age 38-68 who are overweight should have a diabetes screening test once every three years.  
-Other screening tests & preventive services for persons with diabetes include: an eye exam to screen for diabetic retinopathy, a kidney function test, a foot exam, and stricter control over your cholesterol.  
-Cardiovascular screening for adults with routine risk involves an electrocardiogram (ECG) at intervals determined by the provider.  
-Colorectal cancer screening should be done for adults age 54-65 with no increased risk factors for colorectal cancer. There are a number of acceptable methods of screening for this type of cancer. Each test has its own benefits and drawbacks. Discuss with your provider what is most appropriate for you during your annual wellness visit. The different tests include: colonoscopy (considered the best screening method), a fecal occult blood test, a fecal DNA test, and sigmoidoscopy. 
-All adults born between [facility] should be screened once for Hepatitis C. 
-An Abdominal Aortic Aneurysm (AAA) Screening is recommended for men age 73-68 who has ever smoked in their lifetime. Here is a list of your current Health Maintenance items (your personalized list of preventive services) with a due date: 
Health Maintenance Due Topic Date Due  
 DTaP/Tdap/Td  (1 - Tdap) 05/06/1968  Glaucoma Screening   06/18/2020 [facility] Annual Well Visit  08/08/2020

## 2021-05-20 ENCOUNTER — OFFICE VISIT (OUTPATIENT)
Dept: FAMILY MEDICINE CLINIC | Age: 76
End: 2021-05-20
Payer: MEDICARE

## 2021-05-20 VITALS
HEART RATE: 68 BPM | RESPIRATION RATE: 20 BRPM | DIASTOLIC BLOOD PRESSURE: 93 MMHG | BODY MASS INDEX: 32.82 KG/M2 | SYSTOLIC BLOOD PRESSURE: 141 MMHG | OXYGEN SATURATION: 99 % | TEMPERATURE: 97 F | HEIGHT: 69 IN | WEIGHT: 221.6 LBS

## 2021-05-20 DIAGNOSIS — H54.7 VISUAL ACUITY REDUCED: ICD-10-CM

## 2021-05-20 DIAGNOSIS — R73.01 ABNORMAL FASTING GLUCOSE: ICD-10-CM

## 2021-05-20 DIAGNOSIS — E78.2 MIXED HYPERLIPIDEMIA: Primary | ICD-10-CM

## 2021-05-20 DIAGNOSIS — K21.9 GASTROESOPHAGEAL REFLUX DISEASE WITHOUT ESOPHAGITIS: ICD-10-CM

## 2021-05-20 DIAGNOSIS — I10 ESSENTIAL HYPERTENSION: ICD-10-CM

## 2021-05-20 DIAGNOSIS — G60.3 IDIOPATHIC PROGRESSIVE POLYNEUROPATHY: ICD-10-CM

## 2021-05-20 LAB
ALBUMIN SERPL-MCNC: 3.9 G/DL (ref 3.5–5)
ALBUMIN/GLOB SERPL: 1.3 {RATIO} (ref 1.1–2.2)
ALP SERPL-CCNC: 60 U/L (ref 45–117)
ALT SERPL-CCNC: 24 U/L (ref 12–78)
ANION GAP SERPL CALC-SCNC: 6 MMOL/L (ref 5–15)
APPEARANCE UR: CLEAR
AST SERPL-CCNC: 17 U/L (ref 15–37)
BACTERIA URNS QL MICRO: NEGATIVE /HPF
BASOPHILS # BLD: 0.1 K/UL (ref 0–0.1)
BASOPHILS NFR BLD: 1 % (ref 0–1)
BILIRUB SERPL-MCNC: 0.5 MG/DL (ref 0.2–1)
BILIRUB UR QL: NEGATIVE
BUN SERPL-MCNC: 12 MG/DL (ref 6–20)
BUN/CREAT SERPL: 14 (ref 12–20)
CALCIUM SERPL-MCNC: 9.5 MG/DL (ref 8.5–10.1)
CHLORIDE SERPL-SCNC: 102 MMOL/L (ref 97–108)
CHOLEST SERPL-MCNC: 213 MG/DL
CO2 SERPL-SCNC: 28 MMOL/L (ref 21–32)
COLOR UR: NORMAL
CREAT SERPL-MCNC: 0.84 MG/DL (ref 0.7–1.3)
CREAT UR-MCNC: 90.2 MG/DL
DIFFERENTIAL METHOD BLD: ABNORMAL
EOSINOPHIL # BLD: 0.2 K/UL (ref 0–0.4)
EOSINOPHIL NFR BLD: 2 % (ref 0–7)
EPITH CASTS URNS QL MICRO: NORMAL /LPF
ERYTHROCYTE [DISTWIDTH] IN BLOOD BY AUTOMATED COUNT: 12.8 % (ref 11.5–14.5)
EST. AVERAGE GLUCOSE BLD GHB EST-MCNC: 128 MG/DL
GLOBULIN SER CALC-MCNC: 3 G/DL (ref 2–4)
GLUCOSE SERPL-MCNC: 111 MG/DL (ref 65–100)
GLUCOSE UR STRIP.AUTO-MCNC: NEGATIVE MG/DL
HBA1C MFR BLD: 6.1 % (ref 4–5.6)
HCT VFR BLD AUTO: 45.1 % (ref 36.6–50.3)
HDLC SERPL-MCNC: 44 MG/DL
HDLC SERPL: 4.8 {RATIO} (ref 0–5)
HGB BLD-MCNC: 15.1 G/DL (ref 12.1–17)
HGB UR QL STRIP: NEGATIVE
IMM GRANULOCYTES # BLD AUTO: 0 K/UL (ref 0–0.04)
IMM GRANULOCYTES NFR BLD AUTO: 1 % (ref 0–0.5)
KETONES UR QL STRIP.AUTO: NEGATIVE MG/DL
LDLC SERPL CALC-MCNC: 131.4 MG/DL (ref 0–100)
LEUKOCYTE ESTERASE UR QL STRIP.AUTO: NEGATIVE
LYMPHOCYTES # BLD: 2 K/UL (ref 0.8–3.5)
LYMPHOCYTES NFR BLD: 25 % (ref 12–49)
MCH RBC QN AUTO: 30.7 PG (ref 26–34)
MCHC RBC AUTO-ENTMCNC: 33.5 G/DL (ref 30–36.5)
MCV RBC AUTO: 91.7 FL (ref 80–99)
MICROALBUMIN UR-MCNC: 0.52 MG/DL
MICROALBUMIN/CREAT UR-RTO: 6 MG/G (ref 0–30)
MONOCYTES # BLD: 0.8 K/UL (ref 0–1)
MONOCYTES NFR BLD: 10 % (ref 5–13)
NEUTS SEG # BLD: 4.8 K/UL (ref 1.8–8)
NEUTS SEG NFR BLD: 61 % (ref 32–75)
NITRITE UR QL STRIP.AUTO: NEGATIVE
NRBC # BLD: 0 K/UL (ref 0–0.01)
NRBC BLD-RTO: 0 PER 100 WBC
PH UR STRIP: 6 [PH] (ref 5–8)
PLATELET # BLD AUTO: 277 K/UL (ref 150–400)
PMV BLD AUTO: 10.8 FL (ref 8.9–12.9)
POTASSIUM SERPL-SCNC: 4.4 MMOL/L (ref 3.5–5.1)
PROT SERPL-MCNC: 6.9 G/DL (ref 6.4–8.2)
PROT UR STRIP-MCNC: NEGATIVE MG/DL
RBC # BLD AUTO: 4.92 M/UL (ref 4.1–5.7)
RBC #/AREA URNS HPF: NORMAL /HPF (ref 0–5)
SODIUM SERPL-SCNC: 136 MMOL/L (ref 136–145)
SP GR UR REFRACTOMETRY: 1.01 (ref 1–1.03)
TRIGL SERPL-MCNC: 188 MG/DL (ref ?–150)
UA: UC IF INDICATED,UAUC: NORMAL
UROBILINOGEN UR QL STRIP.AUTO: 0.2 EU/DL (ref 0.2–1)
VLDLC SERPL CALC-MCNC: 37.6 MG/DL
WBC # BLD AUTO: 7.7 K/UL (ref 4.1–11.1)
WBC URNS QL MICRO: NORMAL /HPF (ref 0–4)

## 2021-05-20 PROCEDURE — G8536 NO DOC ELDER MAL SCRN: HCPCS | Performed by: FAMILY MEDICINE

## 2021-05-20 PROCEDURE — G8427 DOCREV CUR MEDS BY ELIG CLIN: HCPCS | Performed by: FAMILY MEDICINE

## 2021-05-20 PROCEDURE — G8755 DIAS BP > OR = 90: HCPCS | Performed by: FAMILY MEDICINE

## 2021-05-20 PROCEDURE — G8510 SCR DEP NEG, NO PLAN REQD: HCPCS | Performed by: FAMILY MEDICINE

## 2021-05-20 PROCEDURE — G8417 CALC BMI ABV UP PARAM F/U: HCPCS | Performed by: FAMILY MEDICINE

## 2021-05-20 PROCEDURE — 99204 OFFICE O/P NEW MOD 45 MIN: CPT | Performed by: FAMILY MEDICINE

## 2021-05-20 PROCEDURE — 1101F PT FALLS ASSESS-DOCD LE1/YR: CPT | Performed by: FAMILY MEDICINE

## 2021-05-20 PROCEDURE — G8753 SYS BP > OR = 140: HCPCS | Performed by: FAMILY MEDICINE

## 2021-05-20 PROCEDURE — 3017F COLORECTAL CA SCREEN DOC REV: CPT | Performed by: FAMILY MEDICINE

## 2021-05-20 RX ORDER — LISINOPRIL AND HYDROCHLOROTHIAZIDE 10; 12.5 MG/1; MG/1
1 TABLET ORAL 2 TIMES DAILY
Qty: 180 TABLET | Refills: 1 | Status: SHIPPED | OUTPATIENT
Start: 2021-05-20 | End: 2021-08-30

## 2021-05-20 RX ORDER — PANTOPRAZOLE SODIUM 40 MG/1
40 TABLET, DELAYED RELEASE ORAL DAILY
Qty: 90 TABLET | Refills: 3 | Status: SHIPPED | OUTPATIENT
Start: 2021-05-20 | End: 2022-06-27

## 2021-05-20 RX ORDER — ACETAMINOPHEN 500 MG
TABLET ORAL
Qty: 1 KIT | Refills: 0 | Status: SHIPPED | OUTPATIENT
Start: 2021-05-20

## 2021-05-20 NOTE — PROGRESS NOTES
Delene Fleischer is a 78 y.o. male , new patient, here for evaluation of the following chief complaint(s):    HPI  Chief Complaint   Patient presents with    Complete Physical       1. Mixed hyperlipidemia  HLD  Patient presents today for hyperlipidemia. Patient currently not taking any cholesterol medication    Lab Results   Component Value Date/Time    Cholesterol, total 230 (H) 10/08/2020 02:55 PM    HDL Cholesterol 50 10/08/2020 02:55 PM    LDL, calculated 148 (H) 10/08/2020 02:55 PM    VLDL, calculated 32 10/08/2020 02:55 PM    Triglyceride 160 (H) 10/08/2020 02:55 PM    CHOL/HDL Ratio 4.6 10/08/2020 02:55 PM     2. Essential hypertension  The patient presents today for HTN follow-up. Patient reports he ran out of a pressure medication 1 week ago  Taking medications daily w/o complications. Home BP readings range from does not check. SIde effects of meds: None  Patient trying to follow low salt diet. Lab Results   Component Value Date/Time    Sodium 134 (L) 10/08/2020 02:55 PM    Potassium 4.2 10/08/2020 02:55 PM    Chloride 103 10/08/2020 02:55 PM    CO2 25 10/08/2020 02:55 PM    Anion gap 6 10/08/2020 02:55 PM    Glucose 101 (H) 10/08/2020 02:55 PM    BUN 10 10/08/2020 02:55 PM    Creatinine 0.95 10/08/2020 02:55 PM    BUN/Creatinine ratio 11 (L) 10/08/2020 02:55 PM    GFR est AA >60 10/08/2020 02:55 PM    GFR est non-AA >60 10/08/2020 02:55 PM    Calcium 9.9 10/08/2020 02:55 PM     No results found for: MCACR, MCA1, MCA2, MCA3, MCAU, MCAU2, MCALPOCT    3. Abnormal fasting glucose  Check labs    4. Visual acuity reduced  Requests referral for ophthalmology    5. Idiopathic progressive polyneuropathy  Neurologist Dr. Jolly Kitchen    6. Gastroesophageal reflux disease without esophagitis  Requests refill of Protonix         Review of Systems   Constitutional: Negative. HENT: Negative. Eyes: Negative. Respiratory: Negative. Cardiovascular: Negative. Gastrointestinal: Negative.     Genitourinary: Negative. Musculoskeletal: Negative. Skin: Negative. Neurological: Negative. Endo/Heme/Allergies: Negative. Psychiatric/Behavioral: Negative. Physical Exam  Vitals and nursing note reviewed. HENT:      Head: Normocephalic and atraumatic. Right Ear: External ear normal.      Left Ear: External ear normal.      Nose: Nose normal.   Eyes:      Conjunctiva/sclera: Conjunctivae normal.   Neck:      Thyroid: No thyromegaly. Cardiovascular:      Rate and Rhythm: Normal rate and regular rhythm. Pulmonary:      Effort: Pulmonary effort is normal.      Breath sounds: Normal breath sounds. Abdominal:      General: Bowel sounds are normal. There is no distension. Palpations: Abdomen is soft. Tenderness: There is no abdominal tenderness. Musculoskeletal:         General: Normal range of motion. Cervical back: Normal range of motion and neck supple. Lymphadenopathy:      Cervical: No cervical adenopathy. Skin:     General: Skin is warm and dry. Neurological:      Mental Status: He is alert and oriented to person, place, and time. Psychiatric:         Mood and Affect: Mood and affect normal.       BP (!) 141/93 (BP 1 Location: Right arm, BP Patient Position: Sitting, BP Cuff Size: Adult)   Pulse 68   Temp 97 °F (36.1 °C) (Temporal)   Resp 20   Ht 5' 9\" (1.753 m)   Wt 221 lb 9.6 oz (100.5 kg)   SpO2 99%   BMI 32.72 kg/m²     No Known Allergies    Current Outpatient Medications   Medication Sig    lisinopril-hydroCHLOROthiazide (PRINZIDE, ZESTORETIC) 10-12.5 mg per tablet Take 1 Tablet by mouth two (2) times a day.  pantoprazole (PROTONIX) 40 mg tablet Take 1 Tablet by mouth daily.  Blood Pressure Test Kit-Large (bepretty Arm BP Monitor) kit Check bp daily     No current facility-administered medications for this visit.        Past Medical History:   Diagnosis Date    Arthritis     cervical spine    Calculus of kidney     Charcot-Lucero-Tooth disease     type 2    GERD (gastroesophageal reflux disease)     Hypercholesterolemia     Memory change     Peripheral sensory-motor axonal polyneuropathy     Skin cancer     SCC on chest    Sun-damaged skin     Sunburn, blistering     Tinnitus        Past Surgical History:   Procedure Laterality Date    HX ORTHOPAEDIC      R grt toe surgery       Problem List  Date Reviewed: 5/20/2021        Codes Class Noted    Borderline high serum cholesterol ICD-10-CM: E78.9  ICD-9-CM: 272.9  2/25/2020        Memory change ICD-10-CM: R41.3  ICD-9-CM: 780.93  8/8/2019        Elevated glucose ICD-10-CM: R73.09  ICD-9-CM: 790.29  12/15/2017        Hyperlipidemia LDL goal <100 ICD-10-CM: E78.5  ICD-9-CM: 272.4  6/16/2017        Advanced care planning/counseling discussion ICD-10-CM: Z71.89  ICD-9-CM: V65.49  12/16/2016    Overview Signed 12/16/2016  4:10 PM by Richelle Cm RN     Patient states that a completed AMD is at home. NN encouraged patient to bring a copy to the office for scanning into the medical record. Patient verbalized understanding & agreement.                CMT (Charcot-Lucero-Tooth disease) (Chronic) ICD-10-CM: G60.0  ICD-9-CM: 356.1  12/17/2015    Overview Signed 12/17/2015  2:54 PM by Poncho Villasenor Husbands             Brachial neuritis or radiculitis NOS ICD-10-CM: M54.12  ICD-9-CM: 723.4  12/10/2014        Thoracic or lumbosacral neuritis or radiculitis, unspecified ICD-10-CM: [id number]  ICD-9-CM: 724.4  12/10/2014        Idiopathic progressive polyneuropathy ICD-10-CM: G60.3  ICD-9-CM: 356.4  12/10/2014        Prediabetes ICD-10-CM: R73.03  ICD-9-CM: 790.29  11/12/2013        GERD (gastroesophageal reflux disease) ICD-10-CM: K21.9  ICD-9-CM: 530.81  9/12/2013        Kidney stones (Chronic) ICD-10-CM: N20.0  ICD-9-CM: 592.0  9/12/2013        Squamous cell skin cancer ICD-10-CM: C44.92  ICD-9-CM: 173.92  9/12/2013    Overview Addendum 11/17/2014 10:33 AM by Scot Guadarrama Fracture, cervical vertebra (HCC) ICD-10-CM: S12. 9XXA  ICD-9-CM: 805.00  11/17/1967               No results found for this visit on 05/20/21.      1. Mixed hyperlipidemia    - METABOLIC PANEL, COMPREHENSIVE; Future  - LIPID PANEL; Future  - LIPID PANEL  - METABOLIC PANEL, COMPREHENSIVE    2. Essential hypertension  Mr. Christopher Eastman has been given the following recommendations today due to his elevated BP reading: rescreen BP within a minimum of 2 weeks, lifestyle modifications to include: dietary sodium restriction, anti-hypertensive pharmacologic therapy ordered and lab tests ordered. - METABOLIC PANEL, COMPREHENSIVE; Future  - URINALYSIS W/ REFLEX CULTURE; Future  - MICROALBUMIN, UR, RAND W/ MICROALB/CREAT RATIO; Future  - THYROID CASCADE PROFILE; Future  - lisinopril-hydroCHLOROthiazide (PRINZIDE, ZESTORETIC) 10-12.5 mg per tablet; Take 1 Tablet by mouth two (2) times a day. Dispense: 180 Tablet; Refill: 1  - Blood Pressure Test Kit-Large ([employer] Arm BP Monitor) kit; Check bp daily  Dispense: 1 Kit; Refill: 0  - THYROID CASCADE PROFILE  - MICROALBUMIN, UR, RAND W/ MICROALB/CREAT RATIO  - URINALYSIS W/ REFLEX CULTURE  - METABOLIC PANEL, COMPREHENSIVE    3. Abnormal fasting glucose    - CBC WITH AUTOMATED DIFF; Future  - METABOLIC PANEL, COMPREHENSIVE; Future  - URINALYSIS W/ REFLEX CULTURE; Future  - HEMOGLOBIN A1C WITH EAG; Future  - MICROALBUMIN, UR, RAND W/ MICROALB/CREAT RATIO; Future  - MICROALBUMIN, UR, RAND W/ MICROALB/CREAT RATIO  - HEMOGLOBIN A1C WITH EAG  - URINALYSIS W/ REFLEX CULTURE  - CBC WITH AUTOMATED DIFF  - METABOLIC PANEL, COMPREHENSIVE    4. Visual acuity reduced    - REFERRAL TO OPHTHALMOLOGY    5. Idiopathic progressive polyneuropathy    - CBC WITH AUTOMATED DIFF; Future  - CBC WITH AUTOMATED DIFF    6. Gastroesophageal reflux disease without esophagitis    - pantoprazole (PROTONIX) 40 mg tablet; Take 1 Tablet by mouth daily. Dispense: 90 Tablet;  Refill: 3        Follow-up and Dispositions    · Return in about 6 months (around 11/20/2021) for follow up. I ADVISED PATIENT TO GO TO ER IF SYMPTOMS WORSEN , CHANGE OR FAILS TO IMPROVE. I have discussed the diagnosis with the patient and the intended plan as seen in the above orders. The patient has received an after-visit summary and questions were answered concerning future plans. I have discussed medication side effects and warnings with the patient as well. The patient agrees and understands above plan.            Perez Clark MD

## 2021-05-20 NOTE — PROGRESS NOTES
Patient stated name &     Chief Complaint   Patient presents with    Complete Physical        Health Maintenance Due   Topic    DTaP/Tdap/Td series (1 - Tdap)    Shingrix Vaccine Age 50> (1 of 2)    A1C test (Diabetic or Prediabetic)        Wt Readings from Last 3 Encounters:   21 221 lb 9.6 oz (100.5 kg)   10/08/20 222 lb (100.7 kg)   20 223 lb (101.2 kg)     Temp Readings from Last 3 Encounters:   21 97 °F (36.1 °C) (Temporal)   10/08/20 97.9 °F (36.6 °C) (Oral)   20 97.6 °F (36.4 °C) (Oral)     BP Readings from Last 3 Encounters:   21 (!) 141/93   10/08/20 122/85   20 133/87     Pulse Readings from Last 3 Encounters:   21 68   10/08/20 82   20 (!) 113         Learning Assessment:  :     Learning Assessment 2015 2015 2015 2015 12/10/2014 2014   PRIMARY LEARNER Patient - Patient Patient Patient Patient   HIGHEST LEVEL OF EDUCATION - PRIMARY LEARNER  - - - - - > 4 YEARS OF COLLEGE   BARRIERS PRIMARY LEARNER - - - - - [address] CAREGIVER - - - - - No   PRIMARY LANGUAGE ENGLISH ENGLISH ENGLISH ENGLISH ENGLISH ENGLISH   LEARNER PREFERENCE PRIMARY DEMONSTRATION - DEMONSTRATION READING READING VIDEOS   ANSWERED BY patient - patient Yoni Hernández   RELATIONSHIP SELF - SELF SELF SELF SELF       Depression Screening:  :     3 most recent [facility] 36 Screens 2021   Little interest or pleasure in doing things Not at all   Feeling down, depressed, irritable, or hopeless Not at all   Total Score PHQ 2 0   Trouble falling or staying asleep, or sleeping too much -   Feeling tired or having little energy -   Poor appetite, weight loss, or overeating -   Feeling bad about yourself - or that you are a failure or have let yourself or your family down -   Trouble concentrating on things such as school, work, reading, or watching TV -   Moving or speaking so slowly that other people could have noticed; or the opposite being so fidgety that others notice -   Thoughts of being better off dead, or hurting yourself in some way -   PHQ 9 Score -       Fall Risk Assessment:  :     Fall Risk Assessment, last 12 mths 5/20/2021   Able to walk? Yes   Fall in past 12 months? 0   Do you feel unsteady? 0   Are you worried about falling 0   Number of falls in past 12 months -   Fall with injury? -       Abuse Screening:  :     Abuse Screening Questionnaire 8/8/2019 12/15/2017   Do you ever feel afraid of your partner? N N   Are you in a relationship with someone who physically or mentally threatens you? N N   Is it safe for you to go home? Y Y       Coordination of Care Questionnaire:  :     1) Have you been to an emergency room, urgent care clinic since your last visit? No  Hospitalized since your last visit? No             2) Have you seen or consulted any other health care providers outside of [address] [place] [address] since your last visit? No  (Include any pap smears or colon screenings in this section.)    Patient is accompanied by self I have received verbal consent from David Strong to discuss any/all medical information while they are present in the room.

## 2021-05-25 LAB — TSH SERPL-ACNC: 1.89 UIU/ML (ref 0.45–4.5)

## 2021-08-28 DIAGNOSIS — I10 ESSENTIAL HYPERTENSION: ICD-10-CM

## 2021-08-30 RX ORDER — LISINOPRIL AND HYDROCHLOROTHIAZIDE 10; 12.5 MG/1; MG/1
TABLET ORAL
Qty: 180 TABLET | Refills: 1 | Status: SHIPPED | OUTPATIENT
Start: 2021-08-30 | End: 2022-05-17 | Stop reason: SDUPTHER

## 2021-10-07 ENCOUNTER — TELEPHONE (OUTPATIENT)
Dept: FAMILY MEDICINE CLINIC | Age: 76
End: 2021-10-07

## 2021-10-07 NOTE — TELEPHONE ENCOUNTER
Phone call from  Julio Damon. He opened a can, with a flip top. Part of the metal broke off and went into his mouth to the back of his throat. \"It is very uncomfortable\"  Occurred last nite. No blood, no short of breath, not chocking. Breathing ok, able to talk. Refused to go to ER. I suggested [facility] near his home. He agreed to this.

## 2021-10-09 ENCOUNTER — APPOINTMENT (OUTPATIENT)
Dept: GENERAL RADIOLOGY | Age: 76
End: 2021-10-09
Attending: STUDENT IN AN ORGANIZED HEALTH CARE EDUCATION/TRAINING PROGRAM
Payer: MEDICARE

## 2021-10-09 ENCOUNTER — HOSPITAL ENCOUNTER (EMERGENCY)
Age: 76
Discharge: HOME | End: 2021-10-09
Attending: EMERGENCY MEDICINE
Payer: MEDICARE

## 2021-10-09 VITALS
OXYGEN SATURATION: 97 % | WEIGHT: 221 LBS | BODY MASS INDEX: 32.64 KG/M2 | SYSTOLIC BLOOD PRESSURE: 140 MMHG | TEMPERATURE: 98.3 F | RESPIRATION RATE: 16 BRPM | DIASTOLIC BLOOD PRESSURE: 85 MMHG | HEART RATE: 61 BPM

## 2021-10-09 DIAGNOSIS — R91.1 LUNG NODULE SEEN ON IMAGING STUDY: ICD-10-CM

## 2021-10-09 DIAGNOSIS — R09.89 FOREIGN BODY SENSATION IN THROAT: ICD-10-CM

## 2021-10-09 DIAGNOSIS — R07.0 THROAT PAIN: Primary | ICD-10-CM

## 2021-10-09 PROCEDURE — 99283 EMERGENCY DEPT VISIT LOW MDM: CPT

## 2021-10-09 PROCEDURE — 74011250637 HC RX REV CODE- 250/637: Performed by: STUDENT IN AN ORGANIZED HEALTH CARE EDUCATION/TRAINING PROGRAM

## 2021-10-09 PROCEDURE — 74018 RADEX ABDOMEN 1 VIEW: CPT

## 2021-10-09 PROCEDURE — 74011000250 HC RX REV CODE- 250: Performed by: STUDENT IN AN ORGANIZED HEALTH CARE EDUCATION/TRAINING PROGRAM

## 2021-10-09 PROCEDURE — 70360 X-RAY EXAM OF NECK: CPT

## 2021-10-09 PROCEDURE — 71046 X-RAY EXAM CHEST 2 VIEWS: CPT

## 2021-10-09 RX ADMIN — LIDOCAINE HYDROCHLORIDE 40 ML: 20 SOLUTION ORAL; TOPICAL at 11:01

## 2021-10-09 NOTE — DISCHARGE INSTRUCTIONS
Your xrays do not show any radiopaque foreign body within the neck, chest or abdomen. This is reassuring that you have passed the material given it is metal.     Chest xray did note a 0.5cm nodule in your lung. Recommend calling your primary care physician to see if this has been present compared to prior imaging.

## 2021-10-09 NOTE — ED PROVIDER NOTES
Patient is a 51-year-old male with a past medical history of neck pain, GERD, hyperlipidemia, dementia who presents to ED with wife due to throat pain and possible foreign body x4 days. Patient reports he was opening a large bottle of coffee creamer and removing the metal top when a less than centimeter sized piece of the metal top may have been swallowed. States since then he has had intermittent symptoms of a foreign body sensation in his throat. He was seen by urgent care who advised them they did not have a scope and referred to the ED, wife states patient symptoms then resolved so she did not feel he needed to be seen at that time. States yesterday he was asymptomatic and continues to be able to eat and drink. He woke up this morning and had a scratchy sensation in his throat and is unsure if it could be a foreign body or is related to his GERD. He denies any difficulty swallowing, inability to eat or drink, abdominal pain, nausea, vomiting, shortness of breath, difficulty breathing and constipation.             Past Medical History:   Diagnosis Date    Arthritis     cervical spine    Calculus of kidney     Charcot-Lucero-Tooth disease     type 2    GERD (gastroesophageal reflux disease)     Hypercholesterolemia     Memory change     Peripheral sensory-motor axonal polyneuropathy     Skin cancer     SCC on chest    Sun-damaged skin     Sunburn, blistering     Tinnitus        Past Surgical History:   Procedure Laterality Date    HX ORTHOPAEDIC      R grt toe surgery         Family History:   Problem Relation Age of Onset    Heart Disease Mother     Diabetes Maternal Uncle     Dementia Maternal Grandmother     Diabetes Other     Cancer Neg Hx     Hypertension Neg Hx        Social History     Socioeconomic History    Marital status:      Spouse name: Not on file    Number of children: Not on file    Years of education: Not on file    Highest education level: Not on file Occupational History    Not on file   Tobacco Use    Smoking status: Former Smoker     Packs/day: 0.00     Years: 2.00     Pack years: 0.00     Types: Cigars     Quit date: 1997     Years since quittin.9    Smokeless tobacco: Never Used   Vaping Use    Vaping Use: Never used   Substance and Sexual Activity    Alcohol use: Yes     Alcohol/week: 7.0 standard drinks     Types: 7 Glasses of wine per week     Comment: 1 glass of wine daily     Drug use: No    Sexual activity: Not Currently     Partners: Female   Other Topics Concern    Not on file   Social History Narrative    Not on file     Social Determinants of Health     Financial Resource Strain:     Difficulty of Paying Living Expenses:    Food Insecurity:     Worried About Running Out of Food in the Last Year:     Ran Out of Food in the Last Year:    Transportation Needs:     Lack of Transportation (Medical):  Lack of Transportation (Non-Medical):    Physical Activity:     Days of Exercise per Week:     Minutes of Exercise per Session:    Stress:     Feeling of Stress :    Social Connections:     Frequency of Communication with Friends and Family:     Frequency of Social Gatherings with Friends and Family:     Attends [employer] Services:     Active Member of Clubs or Organizations:     Attends Club or Organization Meetings:     Marital Status:    Intimate Partner Violence:     Fear of Current or Ex-Partner:     Emotionally Abused:     Physically Abused:     Sexually Abused: ALLERGIES: Patient has no known allergies. Review of Systems   Constitutional: Negative for activity change, appetite change, chills and fever. HENT: Positive for sore throat. Negative for congestion, dental problem, drooling, trouble swallowing and voice change. Eyes: Negative for pain and visual disturbance. Respiratory: Negative for cough and shortness of breath.     Cardiovascular: Negative for chest pain, palpitations and leg swelling. Gastrointestinal: Negative for abdominal distention, abdominal pain, constipation, diarrhea, nausea and vomiting. Genitourinary: Negative for decreased urine volume, dysuria, flank pain, frequency and urgency. Musculoskeletal: Negative for back pain and neck pain. Skin: Negative for rash and wound. Allergic/Immunologic: Negative for immunocompromised state. Neurological: Negative for dizziness, syncope, weakness, light-headedness, numbness and headaches. Psychiatric/Behavioral: Negative for confusion. All other systems reviewed and are negative. Vitals:    10/09/21 1017   BP: (!) 140/85   Pulse: 61   Resp: 16   Temp: 98.3 °F (36.8 °C)   SpO2: 97%   Weight: 100.2 kg (221 lb)            Physical Exam  Vitals and nursing note reviewed. Constitutional:       General: He is not in acute distress. Appearance: Normal appearance. He is well-developed. He is not toxic-appearing. HENT:      Head: Normocephalic and atraumatic. Nose: Nose normal.      Mouth/Throat:      Mouth: Mucous membranes are moist.      Pharynx: No oropharyngeal exudate or posterior oropharyngeal erythema. Comments: No visualized foreign body in oropharynx. Tolerating oral secretions without difficulty. Eyes:      General: Lids are normal.      Extraocular Movements: Extraocular movements intact. Conjunctiva/sclera: Conjunctivae normal.   Cardiovascular:      Rate and Rhythm: Normal rate and regular rhythm. Pulses: Normal pulses. Heart sounds: Normal heart sounds, S1 normal and S2 normal.   Pulmonary:      Effort: Pulmonary effort is normal. No accessory muscle usage. Breath sounds: Normal breath sounds. Abdominal:      Palpations: Abdomen is soft. Musculoskeletal:         General: Normal range of motion. Cervical back: Normal range of motion and neck supple. Skin:     General: Skin is warm and dry. Capillary Refill: Capillary refill takes less than 2 seconds. Neurological:      General: No focal deficit present. Mental Status: He is alert and oriented to person, place, and time. Mental status is at baseline. Psychiatric:         Attention and Perception: Attention normal.         Mood and Affect: Mood and affect normal.         Speech: Speech normal.         Behavior: Behavior is cooperative. Thought Content: Thought content normal.         Cognition and Memory: Cognition normal.         Judgment: Judgment normal.          MDM  Number of Diagnoses or Management Options  Foreign body sensation in throat  Lung nodule seen on imaging study  Throat pain  Diagnosis management comments: X-rays of the soft tissue neck, chest and abdomen were performed and there is no radiopaque foreign body visualized. 5 cm lung nodule was noted and no prior imaging to compare to Will advise patient to follow-up with primary care physician regarding this finding. Given patient is tolerating solids and liquids and in no acute distress we will plan to discharge patient given no foreign body is seen and at this point he may have passed it given it has been 4 days. Return to ER warnings discussed in detail. Patient and wife understand agree with plan.        Amount and/or Complexity of Data Reviewed  Tests in the radiology section of CPT®: reviewed  Discuss the patient with other providers: yes (Dr. Prince Freeman, ED Attending )           Procedures

## 2021-10-09 NOTE — ED TRIAGE NOTES
Pt reports trying to open creamer bottle wednesday or Thursday morning when he swallowed a small part of the foil top. Pt reports able to eat and drink since then. Called pcp and was seen at urgent care.

## 2021-10-11 ENCOUNTER — TELEPHONE (OUTPATIENT)
Dept: FAMILY MEDICINE CLINIC | Age: 76
End: 2021-10-11

## 2021-10-11 NOTE — TELEPHONE ENCOUNTER
Pt scheduled         ----- Message from Rosie Galvin sent at 10/11/2021  3:20 PM EDT -----  Regarding: Dr. Colmenares Quest: [phone]  Appointment not available    Caller's first and last name and relationship to patient (if not the patient): N/A      Best contact number: [phone]      Preferred date and time: This week or the week of the 25th      Scheduled appointment date and time:N/A      Reason for appointment: \"Nueropathy and Dementia issues\"      Details to clarify the request: PT states he is also having issues with his toenails - does not have strength to cut them anymore.        Rosie Galvin

## 2021-10-25 ENCOUNTER — OFFICE VISIT (OUTPATIENT)
Dept: FAMILY MEDICINE CLINIC | Age: 76
End: 2021-10-25
Payer: MEDICARE

## 2021-10-25 VITALS
HEART RATE: 77 BPM | OXYGEN SATURATION: 98 % | RESPIRATION RATE: 20 BRPM | BODY MASS INDEX: 31.84 KG/M2 | TEMPERATURE: 97.8 F | DIASTOLIC BLOOD PRESSURE: 90 MMHG | SYSTOLIC BLOOD PRESSURE: 132 MMHG | HEIGHT: 69 IN | WEIGHT: 215 LBS

## 2021-10-25 DIAGNOSIS — R41.3 MEMORY CHANGES: ICD-10-CM

## 2021-10-25 DIAGNOSIS — R73.03 PREDIABETES: ICD-10-CM

## 2021-10-25 DIAGNOSIS — I10 ESSENTIAL HYPERTENSION: ICD-10-CM

## 2021-10-25 DIAGNOSIS — Z00.00 MEDICARE ANNUAL WELLNESS VISIT, SUBSEQUENT: ICD-10-CM

## 2021-10-25 DIAGNOSIS — E78.2 MIXED HYPERLIPIDEMIA: Primary | ICD-10-CM

## 2021-10-25 DIAGNOSIS — F91.9 BEHAVIOR DISTURBANCE: ICD-10-CM

## 2021-10-25 PROCEDURE — G8417 CALC BMI ABV UP PARAM F/U: HCPCS | Performed by: FAMILY MEDICINE

## 2021-10-25 PROCEDURE — G8427 DOCREV CUR MEDS BY ELIG CLIN: HCPCS | Performed by: FAMILY MEDICINE

## 2021-10-25 PROCEDURE — 1101F PT FALLS ASSESS-DOCD LE1/YR: CPT | Performed by: FAMILY MEDICINE

## 2021-10-25 PROCEDURE — G8510 SCR DEP NEG, NO PLAN REQD: HCPCS | Performed by: FAMILY MEDICINE

## 2021-10-25 PROCEDURE — G8755 DIAS BP > OR = 90: HCPCS | Performed by: FAMILY MEDICINE

## 2021-10-25 PROCEDURE — G0439 PPPS, SUBSEQ VISIT: HCPCS | Performed by: FAMILY MEDICINE

## 2021-10-25 PROCEDURE — G8752 SYS BP LESS 140: HCPCS | Performed by: FAMILY MEDICINE

## 2021-10-25 PROCEDURE — 3017F COLORECTAL CA SCREEN DOC REV: CPT | Performed by: FAMILY MEDICINE

## 2021-10-25 PROCEDURE — G8536 NO DOC ELDER MAL SCRN: HCPCS | Performed by: FAMILY MEDICINE

## 2021-10-25 RX ORDER — AMLODIPINE BESYLATE 10 MG/1
10 TABLET ORAL DAILY
Qty: 90 TABLET | Refills: 3 | Status: SHIPPED
Start: 2021-10-25 | End: 2021-12-01 | Stop reason: DRUGHIGH

## 2021-10-25 RX ORDER — ROSUVASTATIN CALCIUM 10 MG/1
10 TABLET, COATED ORAL
Qty: 90 TABLET | Refills: 3 | Status: SHIPPED | OUTPATIENT
Start: 2021-10-25 | End: 2022-05-17 | Stop reason: SDUPTHER

## 2021-10-25 NOTE — PROGRESS NOTES
Patient stated name &     Chief Complaint   Patient presents with    Referral Follow Up     Neuropathy & Dementia issues        Health Maintenance Due   Topic    DTaP/Tdap/Td series (1 - Tdap)    Shingrix Vaccine Age 49> (1 of 2)    COVID-19 Vaccine (3 - Pfizer booster)    Flu Vaccine (1)    Medicare Yearly Exam        Wt Readings from Last 3 Encounters:   10/25/21 215 lb (97.5 kg)   10/09/21 221 lb (100.2 kg)   21 221 lb 9.6 oz (100.5 kg)     Temp Readings from Last 3 Encounters:   10/25/21 97.8 °F (36.6 °C) (Temporal)   10/09/21 98.3 °F (36.8 °C)   21 97 °F (36.1 °C) (Temporal)     BP Readings from Last 3 Encounters:   10/25/21 (!) 132/90   10/09/21 (!) 140/85   21 (!) 141/93     Pulse Readings from Last 3 Encounters:   10/25/21 77   10/09/21 61   21 68         Learning Assessment:  :     Learning Assessment 2015 2015 2015 2015 12/10/2014 2014   PRIMARY LEARNER Patient - Patient Patient Patient Patient   HIGHEST LEVEL OF EDUCATION - PRIMARY LEARNER  - - - - - > 4 YEARS OF COLLEGE   BARRIERS PRIMARY LEARNER - - - - - [address] CAREGIVER - - - - - No   PRIMARY LANGUAGE ENGLISH ENGLISH ENGLISH ENGLISH ENGLISH ENGLISH   LEARNER PREFERENCE PRIMARY DEMONSTRATION - DEMONSTRATION READING READING VIDEOS   ANSWERED BY patient - patient Huber Stanford   RELATIONSHIP SELF - SELF SELF SELF SELF       Depression Screening:  :     3 most recent PHQ Screens 10/25/2021   Little interest or pleasure in doing things Not at all   Feeling down, depressed, irritable, or hopeless Not at all   Total Score PHQ 2 0   Trouble falling or staying asleep, or sleeping too much -   Feeling tired or having little energy -   Poor appetite, weight loss, or overeating -   Feeling bad about yourself - or that you are a failure or have let yourself or your family down -   Trouble concentrating on things such as school, work, reading, or watching TV -   Moving or speaking so slowly that other people could have noticed; or the opposite being so fidgety that others notice -   Thoughts of being better off dead, or hurting yourself in some way -   PHQ 9 Score -       Fall Risk Assessment:  :     Fall Risk Assessment, last 12 mths 10/25/2021   Able to walk? Yes   Fall in past 12 months? 0   Do you feel unsteady? 0   Are you worried about falling 0   Number of falls in past 12 months -   Fall with injury? -       Abuse Screening:  :     Abuse Screening Questionnaire 8/8/2019 12/15/2017   Do you ever feel afraid of your partner? N N   Are you in a relationship with someone who physically or mentally threatens you? N N   Is it safe for you to go home? Y Y       Coordination of Care Questionnaire:  :     1) Have you been to an emergency room, urgent care clinic since your last visit? No    Hospitalized since your last visit? No             2) Have you seen or consulted any other health care providers outside of [address] [place] [address] since your last visit? No  (Include any pap smears or colon screenings in this section.)    3) Do you have an Advance Directive on file? Yes    Patient is accompanied by wife I have received verbal consent from Wilfredo Bennett to discuss any/all medical information while they are present in the room.

## 2021-10-25 NOTE — PATIENT INSTRUCTIONS
Medicare Wellness Visit, Male    The best way to live healthy is to have a lifestyle where you eat a well-balanced diet, exercise regularly, limit alcohol use, and quit all forms of tobacco/nicotine, if applicable. Regular preventive services are another way to keep healthy. Preventive services (vaccines, screening tests, monitoring & exams) can help personalize your care plan, which helps you manage your own care. Screening tests can find health problems at the earliest stages, when they are easiest to treat. [facility] follows the current, evidence-based guidelines published by the [facility] ([facility]) when recommending preventive services for our patients. Because we follow these guidelines, sometimes recommendations change over time as research supports it. (For example, a prostate screening blood test is no longer routinely recommended for men with no symptoms). Of course, you and your doctor may decide to screen more often for some diseases, based on your risk and co-morbidities (chronic disease you are already diagnosed with). Preventive services for you include:  - Medicare offers their members a free annual wellness visit, which is time for you and your primary care provider to discuss and plan for your preventive service needs. Take advantage of this benefit every year!  -All adults over age 72 should receive the recommended pneumonia vaccines. Current USPSTF guidelines recommend a series of two vaccines for the best pneumonia protection.   -All adults should have a flu vaccine yearly and tetanus vaccine every 10 years.  -All adults age 48 and older should receive the shingles vaccines (series of two vaccines).        -All adults age 38-68 who are overweight should have a diabetes screening test once every three years.   -Other screening tests & preventive services for persons with diabetes include: an eye exam to screen for diabetic retinopathy, a kidney function test, a foot exam, and stricter control over your cholesterol.   -Cardiovascular screening for adults with routine risk involves an electrocardiogram (ECG) at intervals determined by the provider.   -Colorectal cancer screening should be done for adults age 54-65 with no increased risk factors for colorectal cancer. There are a number of acceptable methods of screening for this type of cancer. Each test has its own benefits and drawbacks. Discuss with your provider what is most appropriate for you during your annual wellness visit. The different tests include: colonoscopy (considered the best screening method), a fecal occult blood test, a fecal DNA test, and sigmoidoscopy.  -All adults born between [facility] should be screened once for Hepatitis C.  -An Abdominal Aortic Aneurysm (AAA) Screening is recommended for men age 73-68 who has ever smoked in their lifetime.      Here is a list of your current Health Maintenance items (your personalized list of preventive services) with a due date:  Health Maintenance Due   Topic Date Due    DTaP/Tdap/Td  (1 - Tdap) Never done    Shingles Vaccine (1 of 2) Never done    COVID-19 Vaccine (3 - Pfizer booster) 08/27/2021    Yearly Flu Vaccine (1) 09/01/2021

## 2021-10-25 NOTE — PROGRESS NOTES
This is the Subsequent Medicare Annual Wellness Exam, performed 12 months or more after the Initial AWV or the last Subsequent AWV    I have reviewed the patient's medical history in detail and updated the computerized patient record. Assessment/Plan   Education and counseling provided:  Are appropriate based on today's review and evaluation    1. Mixed hyperlipidemia  -     rosuvastatin (CRESTOR) 10 mg tablet; Take 1 Tablet by mouth nightly., Normal, Disp-90 Tablet, R-3  2. Essential hypertension  -     amLODIPine (NORVASC) 10 mg tablet; Take 1 Tablet by mouth daily. , Normal, Disp-90 Tablet, R-3  3. Memory changes  -     REFERRAL TO NEUROLOGY  4. Behavior disturbance  -     REFERRAL TO NEUROLOGY  5. Medicare annual wellness visit, subsequent  6. Prediabetes  -     REFERRAL TO PODIATRY       Depression Risk Factor Screening     3 most recent PHQ Screens 10/25/2021   Little interest or pleasure in doing things Not at all   Feeling down, depressed, irritable, or hopeless Not at all   Total Score PHQ 2 0   Trouble falling or staying asleep, or sleeping too much -   Feeling tired or having little energy -   Poor appetite, weight loss, or overeating -   Feeling bad about yourself - or that you are a failure or have let yourself or your family down -   Trouble concentrating on things such as school, work, reading, or watching TV -   Moving or speaking so slowly that other people could have noticed; or the opposite being so fidgety that others notice -   Thoughts of being better off dead, or hurting yourself in some way -   PHQ 9 Score -       Alcohol Risk Screen    Do you average more than 1 drink per night or more than 7 drinks a week: No    In the past three months have you have had more than 4 drinks containing alcohol on one occasion: No        Functional Ability and Level of Safety    Hearing: The patient needs further evaluation. Activities of Daily Living:   The home contains: Has accessible bathroom  Patient needs help with:  transportation, shopping, preparing meals and managing medications      Ambulation: with difficulty, uses a walker     Fall Risk:  Fall Risk Assessment, last 12 mths 10/25/2021   Able to walk? Yes   Fall in past 12 months? 0   Do you feel unsteady? 0   Are you worried about falling 0   Number of falls in past 12 months -   Fall with injury? -      Abuse Screen:  Patient is not abused       Cognitive Screening    Has your family/caregiver stated any concerns about your memory: yes - Has chronic memory loss. Recently family noticed worsening symptoms. Cognitive Screening: Abnormal - Has short-term memory loss    Review of Systems   Constitutional: Negative. HENT: Negative. Eyes: Negative. Respiratory: Negative. Cardiovascular: Negative. Gastrointestinal: Negative. Genitourinary: Negative. Musculoskeletal: Negative. Skin: Negative. Neurological: Negative. Endo/Heme/Allergies: Negative. Psychiatric/Behavioral: Positive for hallucinations and memory loss. Physical Exam  Vitals and nursing note reviewed. HENT:      Head: Normocephalic and atraumatic. Right Ear: External ear normal.      Left Ear: External ear normal.      Nose: Nose normal.   Eyes:      Conjunctiva/sclera: Conjunctivae normal.   Cardiovascular:      Rate and Rhythm: Normal rate and regular rhythm. Pulmonary:      Effort: Pulmonary effort is normal.      Breath sounds: Normal breath sounds. Abdominal:      General: Bowel sounds are normal. There is no distension. Palpations: Abdomen is soft. Tenderness: There is no abdominal tenderness. Musculoskeletal:         General: Normal range of motion. Cervical back: Normal range of motion and neck supple. Right lower leg: No edema. Left lower leg: No edema.    Feet:      Right foot:      Skin integrity: Skin integrity normal.      Toenail Condition: Right toenails are abnormally thick, long and ingrown. Left foot:      Skin integrity: Skin integrity normal.      Toenail Condition: Left toenails are abnormally thick, long and ingrown. Lymphadenopathy:      Cervical: No cervical adenopathy. Skin:     General: Skin is warm and dry. Neurological:      Mental Status: He is alert. Health Maintenance Due     Health Maintenance Due   Topic Date Due    DTaP/Tdap/Td series (1 - Tdap) Never done    Shingrix Vaccine Age 50> (1 of 2) Never done    COVID-19 Vaccine (3 - Pfizer booster) 08/27/2021    Flu Vaccine (1) 09/01/2021       Patient Care Team   Patient Care Team:  Moise Anderson MD as PCP - General (Family Medicine)  Moise Anderson MD as PCP - [facility] Empaneled Provider    History     Patient Active Problem List   Diagnosis Code    GERD (gastroesophageal reflux disease) K21.9    Kidney stones N20.0    Squamous cell skin cancer C44.92    Prediabetes R73.03    Fracture, cervical vertebra ([address] S12. 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    Advanced care planning/counseling discussion Z71.89    Hyperlipidemia LDL goal <100 E78.5    Elevated glucose R73.09    Memory change R41.3    Borderline high serum cholesterol E78.9     Past Medical History:   Diagnosis Date    Arthritis     cervical spine    Calculus of kidney     Charcot-Lucero-Tooth disease     type 2    GERD (gastroesophageal reflux disease)     Hypercholesterolemia     Memory change     Peripheral sensory-motor axonal polyneuropathy     Skin cancer     SCC on chest    Sun-damaged skin     Sunburn, blistering     Tinnitus       Past Surgical History:   Procedure Laterality Date    HX ORTHOPAEDIC      R grt toe surgery     Current Outpatient Medications   Medication Sig Dispense Refill    amLODIPine (NORVASC) 10 mg tablet Take 1 Tablet by mouth daily. 90 Tablet 3    rosuvastatin (CRESTOR) 10 mg tablet Take 1 Tablet by mouth nightly. 90 Tablet 3    lisinopril-hydroCHLOROthiazide (PRINZIDE, ZESTORETIC) 10-12.5 mg per tablet TAKE ONE TABLET BY MOUTH TWICE A  Tablet 1    pantoprazole (PROTONIX) 40 mg tablet Take 1 Tablet by mouth daily. 90 Tablet 3    Blood Pressure Test Kit-Large ([employer] Arm BP Monitor) kit Check bp daily (Patient not taking: Reported on 10/25/2021) 1 Kit 0     No Known Allergies    Family History   Problem Relation Age of Onset    Heart Disease Mother     Diabetes Maternal Uncle     Dementia Maternal Grandmother     Diabetes Other     Cancer Neg Hx     Hypertension Neg Hx      Social History     Tobacco Use    Smoking status: Former Smoker     Packs/day: 0.00     Years: 2.00     Pack years: 0.00     Types: Cigars     Quit date: 1997     Years since quittin.9    Smokeless tobacco: Never Used   Substance Use Topics    Alcohol use:  Yes     Alcohol/week: 7.0 standard drinks     Types: 7 Glasses of wine per week     Comment: 1 glass of wine daily          Babar Burton MD

## 2021-11-08 ENCOUNTER — OFFICE VISIT (OUTPATIENT)
Dept: FAMILY MEDICINE CLINIC | Age: 76
End: 2021-11-08
Payer: MEDICARE

## 2021-11-08 VITALS
HEART RATE: 79 BPM | OXYGEN SATURATION: 97 % | TEMPERATURE: 97 F | BODY MASS INDEX: 33.03 KG/M2 | DIASTOLIC BLOOD PRESSURE: 76 MMHG | WEIGHT: 223 LBS | RESPIRATION RATE: 16 BRPM | HEIGHT: 69 IN | SYSTOLIC BLOOD PRESSURE: 133 MMHG

## 2021-11-08 DIAGNOSIS — I10 ESSENTIAL HYPERTENSION: ICD-10-CM

## 2021-11-08 DIAGNOSIS — K21.00 GASTROESOPHAGEAL REFLUX DISEASE WITH ESOPHAGITIS WITHOUT HEMORRHAGE: Primary | ICD-10-CM

## 2021-11-08 PROCEDURE — 3017F COLORECTAL CA SCREEN DOC REV: CPT | Performed by: FAMILY MEDICINE

## 2021-11-08 PROCEDURE — 1101F PT FALLS ASSESS-DOCD LE1/YR: CPT | Performed by: FAMILY MEDICINE

## 2021-11-08 PROCEDURE — G8536 NO DOC ELDER MAL SCRN: HCPCS | Performed by: FAMILY MEDICINE

## 2021-11-08 PROCEDURE — G8427 DOCREV CUR MEDS BY ELIG CLIN: HCPCS | Performed by: FAMILY MEDICINE

## 2021-11-08 PROCEDURE — 99213 OFFICE O/P EST LOW 20 MIN: CPT | Performed by: FAMILY MEDICINE

## 2021-11-08 PROCEDURE — G8510 SCR DEP NEG, NO PLAN REQD: HCPCS | Performed by: FAMILY MEDICINE

## 2021-11-08 PROCEDURE — G8417 CALC BMI ABV UP PARAM F/U: HCPCS | Performed by: FAMILY MEDICINE

## 2021-11-08 PROCEDURE — G8752 SYS BP LESS 140: HCPCS | Performed by: FAMILY MEDICINE

## 2021-11-08 PROCEDURE — G8754 DIAS BP LESS 90: HCPCS | Performed by: FAMILY MEDICINE

## 2021-11-08 RX ORDER — SUCRALFATE 1 G/1
1 TABLET ORAL 3 TIMES DAILY
Qty: 90 TABLET | Refills: 5 | Status: SHIPPED | OUTPATIENT
Start: 2021-11-08 | End: 2022-02-10

## 2021-11-08 NOTE — PROGRESS NOTES
1. Have you been to the ER, urgent care clinic since your last visit? Hospitalized since your last visit? No    2. Have you seen or consulted any other health care providers outside of the [address] [place] [address] since your last visit? Include any pap smears or colon screening. No    Health Maintenance Due   Topic Date Due    DTaP/Tdap/Td series (1 - Tdap) Never done    Shingrix Vaccine Age 50> (1 of 2) Never done    COVID-19 Vaccine (3 - Pfizer booster) 08/27/2021    Flu Vaccine (1) 09/01/2021       Chief Complaint   Patient presents with    Follow-up    Heartburn    Hypertension     3 most recent PHQ Screens 11/8/2021   Little interest or pleasure in doing things Not at all   Feeling down, depressed, irritable, or hopeless Not at all   Total Score PHQ 2 0   Trouble falling or staying asleep, or sleeping too much -   Feeling tired or having little energy -   Poor appetite, weight loss, or overeating -   Feeling bad about yourself - or that you are a failure or have let yourself or your family down -   Trouble concentrating on things such as school, work, reading, or watching TV -   Moving or speaking so slowly that other people could have noticed; or the opposite being so fidgety that others notice -   Thoughts of being better off dead, or hurting yourself in some way -   PHQ 9 Score -     Abuse Screening Questionnaire 11/8/2021   Do you ever feel afraid of your partner? N   Are you in a relationship with someone who physically or mentally threatens you? N   Is it safe for you to go home?  Y     Visit Vitals  /76 (BP 1 Location: Left arm, BP Patient Position: Sitting, BP Cuff Size: Adult)   Pulse 79   Temp 97 °F (36.1 °C)   Resp 16   Ht 5' 9\" (1.753 m)   Wt 223 lb (101.2 kg)   SpO2 97%   BMI 32.93 kg/m²     Learning Assessment 8/17/2015   PRIMARY LEARNER Patient   HIGHEST LEVEL OF EDUCATION - PRIMARY LEARNER  -   BARRIERS PRIMARY LEARNER -   [address]  CAREGIVER -   PRIMARY LANGUAGE ENGLISH   LEARNER PREFERENCE PRIMARY DEMONSTRATION   ANSWERED BY patient   RELATIONSHIP SELF

## 2021-11-08 NOTE — PROGRESS NOTES
2021   Jana Escalera (: 1945) is a 78 y.o. male, established patient, here for evaluation of the following chief complaint(s):  Follow-up, Heartburn, and Hypertension     ASSESSMENT/PLAN:  Below is the assessment and plan developed based on review of pertinent history, physical exam, labs, studies, and medications. 1. Gastroesophageal reflux disease with esophagitis without hemorrhage  -     sucralfate (CARAFATE) 1 gram tablet; Take 1 Tablet by mouth three (3) times daily. , Normal, Disp-90 Tablet, R-5  2. Essential hypertension    Return in about 6 months (around 2022) for follow up. SUBJECTIVE/OBJECTIVE:  HPI   1. Gastroesophageal reflux disease with esophagitis without hemorrhage  Patient reports symptoms of heartburn. He is currently taking Protonix 40 mg daily. Reports he takes medication daily without side effects. Reports his symptoms are not under control with Protonix. I will add sucralfate. 2. Essential hypertension  Patient is here for follow-up of elevated blood pressure. He is currently taking amlodipine 10 mg in addition to lisinopril and HCTZ. He reports no side effects from current dose. Does not check blood pressure at home. Lab Results   Component Value Date/Time    Sodium 136 2021 09:33 AM    Potassium 4.4 2021 09:33 AM    Chloride 102 2021 09:33 AM    CO2 28 2021 09:33 AM    Anion gap 6 2021 09:33 AM    Glucose 111 (H) 2021 09:33 AM    BUN 12 2021 09:33 AM    Creatinine 0.84 2021 09:33 AM    BUN/Creatinine ratio 14 2021 09:33 AM    GFR est AA >60 2021 09:33 AM    GFR est non-AA >60 2021 09:33 AM    Calcium 9.5 2021 09:33 AM     Lab Results   Component Value Date/Time    Microalbumin/Creat ratio (mg/g creat) 6 2021 09:33 AM    Microalbumin,urine random 0.52 2021 09:33 AM         No results found for any visits on 21. Review of Systems   Constitutional: Negative. HENT: Negative. Eyes: Negative. Respiratory: Negative. Cardiovascular: Negative. Gastrointestinal: Positive for heartburn. Genitourinary: Negative. Musculoskeletal: Negative. Skin: Negative. Neurological: Negative. Endo/Heme/Allergies: Negative. Psychiatric/Behavioral: Positive for memory loss. Physical Exam  Vitals and nursing note reviewed. HENT:      Head: Normocephalic and atraumatic. Right Ear: External ear normal.      Left Ear: External ear normal.      Nose: Nose normal.   Eyes:      Conjunctiva/sclera: Conjunctivae normal.   Cardiovascular:      Rate and Rhythm: Normal rate and regular rhythm. Pulmonary:      Effort: Pulmonary effort is normal.      Breath sounds: Normal breath sounds. Abdominal:      General: Bowel sounds are normal. There is no distension. Palpations: Abdomen is soft. Tenderness: There is no abdominal tenderness. Musculoskeletal:         General: Normal range of motion. Cervical back: Normal range of motion and neck supple. Lymphadenopathy:      Cervical: No cervical adenopathy. Skin:     General: Skin is warm and dry. Neurological:      Mental Status: He is alert. /76 (BP 1 Location: Left arm, BP Patient Position: Sitting, BP Cuff Size: Adult)   Pulse 79   Temp 97 °F (36.1 °C)   Resp 16   Ht 5' 9\" (1.753 m)   Wt 223 lb (101.2 kg)   SpO2 97%   BMI 32.93 kg/m²     No Known Allergies    Current Outpatient Medications   Medication Sig    sucralfate (CARAFATE) 1 gram tablet Take 1 Tablet by mouth three (3) times daily.  rosuvastatin (CRESTOR) 10 mg tablet Take 1 Tablet by mouth nightly.  lisinopril-hydroCHLOROthiazide (PRINZIDE, ZESTORETIC) 10-12.5 mg per tablet TAKE ONE TABLET BY MOUTH TWICE A DAY    pantoprazole (PROTONIX) 40 mg tablet Take 1 Tablet by mouth daily.  amLODIPine (NORVASC) 10 mg tablet Take 1 Tablet by mouth daily.     Blood Pressure Test Kit-Large ([employer] Arm BP Monitor) kit Check bp daily (Patient not taking: Reported on 10/25/2021)     No current facility-administered medications for this visit. Past Medical History:   Diagnosis Date    Arthritis     cervical spine    Calculus of kidney     Charcot-Lucero-Tooth disease     type 2    GERD (gastroesophageal reflux disease)     Hypercholesterolemia     Memory change     Peripheral sensory-motor axonal polyneuropathy     Skin cancer     SCC on chest    Sun-damaged skin     Sunburn, blistering     Tinnitus        Past Surgical History:   Procedure Laterality Date    HX ORTHOPAEDIC      R grt toe surgery       Social History:  reports that he quit smoking about 24 years ago. His smoking use included cigars. He smoked 0.00 packs per day for 2.00 years. He has never used smokeless tobacco. He reports current alcohol use of about 7.0 standard drinks of alcohol per week. He reports that he does not use drugs. Patient Care Team:  Chau Covarrubias MD as PCP - General (Family Medicine)  Chau Covarrubias MD as PCP - [facility] Provider    Problem List  Date Reviewed: 11/8/2021          Codes Class Noted    Borderline high serum cholesterol ICD-10-CM: E78.9  ICD-9-CM: 272.9  2/25/2020        Memory change ICD-10-CM: R41.3  ICD-9-CM: 780.93  8/8/2019        Elevated glucose ICD-10-CM: R73.09  ICD-9-CM: 790.29  12/15/2017        Hyperlipidemia LDL goal <100 ICD-10-CM: E78.5  ICD-9-CM: 272.4  6/16/2017        Advanced care planning/counseling discussion ICD-10-CM: Z71.89  ICD-9-CM: V65.49  12/16/2016    Overview Signed 12/16/2016  4:10 PM by Zane Nunez RN     Patient states that a completed AMD is at home. NN encouraged patient to bring a copy to the office for scanning into the medical record. Patient verbalized understanding & agreement.                CMT (Charcot-Lucero-Tooth disease) (Chronic) ICD-10-CM: G60.0  ICD-9-CM: 356.1  12/17/2015    Overview Signed 12/17/2015  2:54 PM by Foster Rodney Brachial neuritis or radiculitis NOS ICD-10-CM: M54.12  ICD-9-CM: 723.4  12/10/2014        Thoracic or lumbosacral neuritis or radiculitis, unspecified ICD-10-CM: [id number]  ICD-9-CM: 724.4  12/10/2014        Idiopathic progressive polyneuropathy ICD-10-CM: G60.3  ICD-9-CM: 356.4  12/10/2014        Prediabetes ICD-10-CM: R73.03  ICD-9-CM: 790.29  11/12/2013        GERD (gastroesophageal reflux disease) ICD-10-CM: K21.9  ICD-9-CM: 530.81  9/12/2013        Kidney stones (Chronic) ICD-10-CM: N20.0  ICD-9-CM: 592.0  9/12/2013        Squamous cell skin cancer ICD-10-CM: C44.92  ICD-9-CM: 173.92  9/12/2013    Overview Addendum 11/17/2014 10:33 AM by Reddy Perez     Chest -  Dr. Kurt Vega               Fracture, cervical vertebra [facility]) ICD-10-CM: S12. 9XXA  ICD-9-CM: 805.00  11/17/1969                   I ADVISED PATIENT TO GO TO ER IF SYMPTOMS WORSEN , CHANGE OR FAILS TO IMPROVE. I have discussed the diagnosis with the patient and the intended plan as seen in the above orders. The patient has received an after-visit summary and questions were answered concerning future plans. I have discussed medication side effects and warnings with the patient as well. The patient agrees and understands above plan. An electronic signature was used to authenticate this note.   -- Pooja Douglas MD

## 2021-11-16 ENCOUNTER — NURSE TRIAGE (OUTPATIENT)
Dept: OTHER | Facility: CLINIC | Age: 76
End: 2021-11-16

## 2021-11-16 NOTE — TELEPHONE ENCOUNTER
Received call from Tayla at [facility] with [employer]. Brief description of triage: Patient calls in stating he has bilateral leg/foot swelling. Triage indicates for patient to be seen in the office today. Advised caller if unable to get an appointment in the suggested time frame to go to an [facility] or walk-in clinic, caller agreeable. Care advice provided, patient verbalizes understanding; denies any other questions or concerns; instructed to call back for any new or worsening symptoms. Writer provided warm transfer to [place] at [facility] for appointment scheduling. Attention Provider: Thank you for allowing me to participate in the care of your patient. The patient was connected to triage in response to information provided to the [facility]. Please do not respond through this encounter as the response is not directed to a shared pool. Reason for Disposition   MODERATE swelling of both ankles (e.g., swelling extends up to the knees) AND new onset or worsening    Answer Assessment - Initial Assessment Questions  1. ONSET: \"When did the swelling start? \" (e.g., minutes, hours, days)      x1 week ago/several weeks     2. LOCATION: \"What part of the leg is swollen? \"  \"Are both legs swollen or just one leg? \"      Both legs/feet- below knees     3. SEVERITY: \"How bad is the swelling? \" (e.g., localized; mild, moderate, severe)   - Localized - small area of swelling localized to one leg   - MILD pedal edema - swelling limited to foot and ankle, pitting edema < 1/4 inch (6 mm) deep, rest and elevation eliminate most or all swelling   - MODERATE edema - swelling of lower leg to knee, pitting edema > 1/4 inch (6 mm) deep, rest and elevation only partially reduce swelling   - SEVERE edema - swelling extends above knee, facial or hand swelling present       Moderate     4. REDNESS: \"Does the swelling look red or infected? \"      Slight redness to both legs     5.  PAIN: \"Is the swelling painful to touch? \" If so, ask: \"How painful is it? \"   (Scale 1-10; mild, moderate or severe)      No pain, tight feeling     6. FEVER: \"Do you have a fever? \" If so, ask: \"What is it, how was it measured, and when did it start? \"       Denies     7. CAUSE: \"What do you think is causing the leg swelling? \"      Walking daily     8. MEDICAL HISTORY: \"Do you have a history of heart failure, kidney disease, liver failure, or cancer? \"      Denies     9. RECURRENT SYMPTOM: \"Have you had leg swelling before? \" If so, ask: \"When was the last time? \" \"What happened that time? \"      Denies     10. OTHER SYMPTOMS: \"Do you have any other symptoms? \" (e.g., chest pain, difficulty breathing)        Denies     11. PREGNANCY: \"Is there any chance you are pregnant? \" \"When was your last menstrual period? \"        N/A    Protocols used: LEG SWELLING AND EDEMA-ADULT-OH

## 2021-12-01 ENCOUNTER — OFFICE VISIT (OUTPATIENT)
Dept: FAMILY MEDICINE CLINIC | Age: 76
End: 2021-12-01
Payer: MEDICARE

## 2021-12-01 VITALS
DIASTOLIC BLOOD PRESSURE: 85 MMHG | OXYGEN SATURATION: 97 % | RESPIRATION RATE: 16 BRPM | HEART RATE: 91 BPM | WEIGHT: 214 LBS | BODY MASS INDEX: 31.7 KG/M2 | TEMPERATURE: 98 F | SYSTOLIC BLOOD PRESSURE: 126 MMHG | HEIGHT: 69 IN

## 2021-12-01 DIAGNOSIS — R73.03 PREDIABETES: ICD-10-CM

## 2021-12-01 DIAGNOSIS — I10 ESSENTIAL HYPERTENSION: ICD-10-CM

## 2021-12-01 DIAGNOSIS — R60.0 PEDAL EDEMA: Primary | ICD-10-CM

## 2021-12-01 PROCEDURE — G8754 DIAS BP LESS 90: HCPCS | Performed by: NURSE PRACTITIONER

## 2021-12-01 PROCEDURE — 99214 OFFICE O/P EST MOD 30 MIN: CPT | Performed by: NURSE PRACTITIONER

## 2021-12-01 PROCEDURE — G8536 NO DOC ELDER MAL SCRN: HCPCS | Performed by: NURSE PRACTITIONER

## 2021-12-01 PROCEDURE — G8417 CALC BMI ABV UP PARAM F/U: HCPCS | Performed by: NURSE PRACTITIONER

## 2021-12-01 PROCEDURE — G8752 SYS BP LESS 140: HCPCS | Performed by: NURSE PRACTITIONER

## 2021-12-01 PROCEDURE — 3017F COLORECTAL CA SCREEN DOC REV: CPT | Performed by: NURSE PRACTITIONER

## 2021-12-01 PROCEDURE — 1101F PT FALLS ASSESS-DOCD LE1/YR: CPT | Performed by: NURSE PRACTITIONER

## 2021-12-01 PROCEDURE — G8427 DOCREV CUR MEDS BY ELIG CLIN: HCPCS | Performed by: NURSE PRACTITIONER

## 2021-12-01 PROCEDURE — G8432 DEP SCR NOT DOC, RNG: HCPCS | Performed by: NURSE PRACTITIONER

## 2021-12-01 RX ORDER — AMLODIPINE BESYLATE 5 MG/1
5 TABLET ORAL DAILY
Qty: 30 TABLET | Refills: 0 | Status: SHIPPED
Start: 2021-12-01 | End: 2022-05-17

## 2021-12-01 NOTE — PATIENT INSTRUCTIONS
Decrease dose of Amlodipine to 5 mg daily. You may break the remaining tablets in half or just use the new prescription. We are going from 10mg to 5 mg. Swelling of feet is a common side affect and it co insides with when you began taking this medication a month ago. Elevate feet, walk more, drink water, decrease salt intake. Please monitor blood pressure at home. Goal is 140/90 and below. Please schedule a return visit in 2 weeks to evaluate swelling and Blood pressure control. Sooner if swelling increases. Leg and Ankle Edema: Care Instructions  Your Care Instructions  Swelling in the legs, ankles, and feet is called edema. It is common after you sit or stand for a while. Long plane flights or car rides often cause swelling in the legs and feet. You may also have swelling if you have to stand for long periods of time at your job. Problems with the veins in the legs (varicose veins) and changes in hormones can also cause swelling. Sometimes the swelling in the ankles and feet is caused by a more serious problem, such as heart failure, infection, blood clots, or liver or kidney disease. Follow-up care is a key part of your treatment and safety. Be sure to make and go to all appointments, and call your doctor if you are having problems. It's also a good idea to know your test results and keep a list of the medicines you take. How can you care for yourself at home? · If your doctor gave you medicine, take it as prescribed. Call your doctor if you think you are having a problem with your medicine. · Whenever you are resting, raise your legs up. Try to keep the swollen area higher than the level of your heart. · Take breaks from standing or sitting in one position. ? Walk around to increase the blood flow in your lower legs. ? Move your feet and ankles often while you stand, or tighten and relax your leg muscles. · Wear support stockings. Put them on in the morning, before swelling gets worse.   · Eat a balanced diet. Lose weight if you need to. · Limit the amount of salt (sodium) in your diet. Salt holds fluid in the body and may increase swelling. When should you call for help? Call 911 anytime you think you may need emergency care. For example, call if:    · You have symptoms of a blood clot in your lung (called a pulmonary embolism). These may include:  ? Sudden chest pain. ? Trouble breathing. ? Coughing up blood. Call your doctor now or seek immediate medical care if:    · You have signs of a blood clot, such as:  ? Pain in your calf, back of the knee, thigh, or groin. ? Redness and swelling in your leg or groin.     · You have symptoms of infection, such as:  ? Increased pain, swelling, warmth, or redness. ? Red streaks or pus. ? A fever. Watch closely for changes in your health, and be sure to contact your doctor if:    · Your swelling is getting worse.     · You have new or worsening pain in your legs.     · You do not get better as expected. Where can you learn more? Go to http://www.gray.com/  Enter B415 in the search box to learn more about \"Leg and Ankle Edema: Care Instructions. \"  Current as of: July 1, 2021               Content Version: 13.0  © 2006-2021 [employer]. Care instructions adapted under license by [employer] (which disclaims liability or warranty for this information). If you have questions about a medical condition or this instruction, always ask your healthcare professional. [place] [address] any warranty or liability for your use of this information. Home Blood Pressure Test: About This Test  What is it? A home blood pressure test allows you to keep track of your blood pressure at home. Blood pressure is a measure of the force of blood against the walls of your arteries. Blood pressure readings include two numbers, such as 130/80 (say \"130 over 80\").  The first number is the systolic pressure. The second number is the diastolic pressure. Why is this test done? You may do this test at home to:  · Find out if you have high blood pressure. · Track your blood pressure if you have high blood pressure. · Track how well medicine is working to reduce high blood pressure. · Check how lifestyle changes, such as weight loss and exercise, are affecting blood pressure. How do you prepare for the test?  For at least 30 minutes before you take your blood pressure, don't exercise, drink caffeine, or smoke. Empty your bladder before the test. Sit quietly with your back straight and both feet on the floor for at least 5 minutes. This helps you take your blood pressure while you feel comfortable and relaxed. How is the test done? · If your doctor recommends it, take your blood pressure twice a day. Take it in the morning and evening. · Sit with your arm slightly bent and resting on a table so that your upper arm is at the same level as your heart. · Use the same arm each time you take your blood pressure. · Place the blood pressure cuff on the bare skin of your upper arm. You may have to roll up your sleeve, remove your arm from the sleeve, or take your shirt off. · Wrap the blood pressure cuff around your upper arm so that the lower edge of the cuff is about 1 inch above the bend of your elbow. · Do not move, talk, or text while you take your blood pressure. Follow the instructions that came with your blood pressure monitor. They might be different from the following. · Press the on/off button on the automatic monitor. Then you may need to wait until the screen says the monitor is ready. · Press the start button. The cuff will inflate and deflate by itself. · Your blood pressure numbers will appear on the screen. · Wait one minute and take your blood pressure again. · If your monitor does not automatically save your numbers, write them in your log book, along with the date and time.   Follow-up care is a key part of your treatment and safety. Be sure to make and go to all appointments, and call your doctor if you are having problems. It's also a good idea to keep a list of the medicines you take. Where can you learn more? Go to http://www.phipps.com/  Enter C427 in the search box to learn more about \"Home Blood Pressure Test: About This Test.\"  Current as of: April 29, 2021               Content Version: 13.0  © 2006-2021 [employer]. Care instructions adapted under license by [employer] (which disclaims liability or warranty for this information). If you have questions about a medical condition or this instruction, always ask your healthcare professional. [address] any warranty or liability for your use of this information. Low Sodium Diet (2,000 Milligram): Care Instructions  Overview     Limiting sodium can be an important part of managing some health problems. The most common source of sodium is salt. People get most of the salt in their diet from canned, prepared, and packaged foods. Fast food and restaurant meals also are very high in sodium. Your doctor will probably limit your sodium to less than 2,000 milligrams (mg) a day. This limit counts all the sodium in prepared and packaged foods and any salt you add to your food. Follow-up care is a key part of your treatment and safety. Be sure to make and go to all appointments, and call your doctor if you are having problems. It's also a good idea to know your test results and keep a list of the medicines you take. How can you care for yourself at home? Read food labels  · Read labels on cans and food packages. The labels tell you how much sodium is in each serving. Make sure that you look at the serving size. If you eat more than the serving size, you have eaten more sodium. · Food labels also tell you the Percent Daily Value for sodium.  Choose products with low Percent Daily Values for sodium. · Be aware that sodium can come in forms other than salt, including monosodium glutamate (MSG), sodium citrate, and sodium bicarbonate (baking soda). MSG is often added to Asian food. When you eat out, you can sometimes ask for food without MSG or added salt. Buy low-sodium foods  · Buy foods that are labeled \"unsalted\" (no salt added), \"sodium-free\" (less than 5 mg of sodium per serving), or \"low-sodium\" (140 mg or less of sodium per serving). Foods labeled \"reduced-sodium\" and \"light sodium\" may still have too much sodium. Be sure to read the label to see how much sodium you are getting. · Buy fresh vegetables, or frozen vegetables without added sauces. Buy low-sodium versions of canned vegetables, soups, and other canned goods. Prepare low-sodium meals  · Cut back on the amount of salt you use in cooking. This will help you adjust to the taste. Do not add salt after cooking. One teaspoon of salt has about 2,300 mg of sodium. · Take the salt shaker off the table. · Flavor your food with garlic, lemon juice, onion, vinegar, herbs, and spices. Do not use soy sauce, lite soy sauce, steak sauce, onion salt, garlic salt, celery salt, or ketchup on your food. · Use low-sodium salad dressings, sauces, and ketchup. Or make your own salad dressings and sauces without adding salt. · Use less salt (or none) when recipes call for it. You can often use half the salt a recipe calls for without losing flavor. Other foods such as rice, pasta, and grains do not need added salt. · Rinse canned vegetables, and cook them in fresh water. This removes some--but not all--of the salt. · Avoid water that is naturally high in sodium or that has been treated with water softeners, which add sodium. If you buy bottled water, read the label and choose a sodium-free brand. Avoid high-sodium foods  · Avoid eating:  ? Smoked, cured, salted, and canned meat, fish, and poultry.   ? Ham, lopez, hot dogs, and luncheon meats. ? Regular, hard, and processed cheese and regular peanut butter. ? Crackers with salted tops, and other salted snack foods such as pretzels, chips, and salted popcorn. ? Frozen prepared meals, unless labeled low-sodium. ? Canned and dried soups, broths, and bouillon, unless labeled sodium-free or low-sodium. ? Canned vegetables, unless labeled sodium-free or low-sodium. ? Western Teresa fries, pizza, tacos, and other fast foods. ? Pickles, olives, ketchup, and other condiments, especially soy sauce, unless labeled sodium-free or low-sodium. Where can you learn more? Go to http://www.gray.com/  Enter V843 in the search box to learn more about \"Low Sodium Diet (2,000 Milligram): Care Instructions. \"  Current as of: December 17, 2020               Content Version: 13.0  © 3710-4960 [employer]. Care instructions adapted under license by [employer] (which disclaims liability or warranty for this information). If you have questions about a medical condition or this instruction, always ask your healthcare professional. [place] [address] any warranty or liability for your use of this information.

## 2021-12-01 NOTE — PROGRESS NOTES
Identified pt with two pt identifiers(name and ). Reviewed record in preparation for visit and have obtained necessary documentation. Chief Complaint   Patient presents with    Foot Swelling     for x2-3 weeks; bilateral swollen feet        Health Maintenance Due   Topic    DTaP/Tdap/Td series (1 - Tdap)    Shingrix Vaccine Age 49> (1 of 2)    COVID-19 Vaccine (3 - Booster for Soler Peter series)    Flu Vaccine (1)       Coordination of Care Questionnaire:  :   1) Have you been to an emergency room, urgent care, or hospitalized since your last visit? If yes, where when, and reason for visit? no      2. Have seen or consulted any other health care provider since your last visit? If yes, where when, and reason for visit?  no        Patient is accompanied by self I have received verbal consent from Osiel Moreland to discuss any/all medical information while they are present in the room.

## 2021-12-02 NOTE — PROGRESS NOTES
Humberto Angela is a 78 y.o. male who presents to clinic today for the following:    Chief Complaint   Patient presents with    Foot Swelling     for x2-3 weeks; bilateral swollen feet       Vitals:    12/01/21 1402   BP: 126/85   Pulse: 91   Resp: 16   Temp: 98 °F (36.7 °C)   TempSrc: Temporal   SpO2: 97%   Weight: 214 lb (97.1 kg)   Height: 5' 9\" (1.753 m)       Body mass index is 31.6 kg/m². Patients past medical, surgical and family histories were reviewed. Allergies and Medications reviewed and updated. Current Outpatient Medications:     amLODIPine (NORVASC) 5 mg tablet, Take 1 Tablet by mouth daily. Indications: high blood pressure, Disp: 30 Tablet, Rfl: 0    sucralfate (CARAFATE) 1 gram tablet, Take 1 Tablet by mouth three (3) times daily. , Disp: 90 Tablet, Rfl: 5    rosuvastatin (CRESTOR) 10 mg tablet, Take 1 Tablet by mouth nightly., Disp: 90 Tablet, Rfl: 3    lisinopril-hydroCHLOROthiazide (PRINZIDE, ZESTORETIC) 10-12.5 mg per tablet, TAKE ONE TABLET BY MOUTH TWICE A DAY, Disp: 180 Tablet, Rfl: 1    pantoprazole (PROTONIX) 40 mg tablet, Take 1 Tablet by mouth daily. , Disp: 90 Tablet, Rfl: 3    Blood Pressure Test Kit-Large ([employer] Arm BP Monitor) kit, Check bp daily (Patient not taking: Reported on 10/25/2021), Disp: 1 Kit, Rfl: 0    No Known Allergies    Past Medical History:   Diagnosis Date    Arthritis     cervical spine    Calculus of kidney     Charcot-Lucero-Tooth disease     type 2    GERD (gastroesophageal reflux disease)     Hypercholesterolemia     Memory change     Peripheral sensory-motor axonal polyneuropathy     Skin cancer     SCC on chest    Sun-damaged skin     Sunburn, blistering     Tinnitus        Past Surgical History:   Procedure Laterality Date    HX ORTHOPAEDIC      R grt toe surgery       Family History   Problem Relation Age of Onset    Heart Disease Mother     Diabetes Maternal Uncle     Dementia Maternal Grandmother     Diabetes Other  Cancer Neg Hx     Hypertension Neg Hx        Social History     Socioeconomic History    Marital status:      Spouse name: Not on file    Number of children: Not on file    Years of education: Not on file    Highest education level: Not on file   Occupational History    Not on file   Tobacco Use    Smoking status: Former Smoker     Packs/day: 0.00     Years: 2.00     Pack years: 0.00     Types: Cigars     Quit date: 1997     Years since quittin.0    Smokeless tobacco: Never Used   Vaping Use    Vaping Use: Never used   Substance and Sexual Activity    Alcohol use: Yes     Alcohol/week: 7.0 standard drinks     Types: 7 Glasses of wine per week     Comment: 1 glass of wine daily     Drug use: No    Sexual activity: Not Currently     Partners: Female   Other Topics Concern    Not on file   Social History Narrative    Not on file     Social Determinants of Health     Financial Resource Strain:     Difficulty of Paying Living Expenses: Not on file   Food Insecurity:     Worried About Running Out of Food in the Last Year: Not on file    Millie of Food in the Last Year: Not on file   Transportation Needs:     Lack of Transportation (Medical): Not on file    Lack of Transportation (Non-Medical):  Not on file   Physical Activity:     Days of Exercise per Week: Not on file    Minutes of Exercise per Session: Not on file   Stress:     Feeling of Stress : Not on file   Social Connections:     Frequency of Communication with Friends and Family: Not on file    Frequency of Social Gatherings with Friends and Family: Not on file    Attends Rastafari Services: Not on file    Active Member of Clubs or Organizations: Not on file    Attends Club or Organization Meetings: Not on file    Marital Status: Not on file   Intimate Partner Violence:     Fear of Current or Ex-Partner: Not on file    Emotionally Abused: Not on file    Physically Abused: Not on file    Sexually Abused: Not on file   Housing Stability:     Unable to Pay for Housing in the Last Year: Not on file    Number of Places Lived in the Last Year: Not on file    Unstable Housing in the Last Year: Not on file           Physical Exam  Vitals and nursing note reviewed. Constitutional:       Appearance: He is obese. HENT:      Head: Normocephalic. Nose: Nose normal.      Mouth/Throat:      Mouth: Mucous membranes are moist.   Eyes:      Pupils: Pupils are equal, round, and reactive to light. Cardiovascular:      Rate and Rhythm: Normal rate and regular rhythm. Pulses: Normal pulses. Heart sounds: Normal heart sounds. Pulmonary:      Effort: Pulmonary effort is normal.      Breath sounds: Normal breath sounds. Abdominal:      General: Abdomen is flat. Musculoskeletal:      Cervical back: Normal range of motion. Right lower leg: Edema present. Left lower leg: Edema present. Skin:     General: Skin is warm. Capillary Refill: Capillary refill takes less than 2 seconds. Neurological:      General: No focal deficit present. Mental Status: He is alert and oriented to person, place, and time. Psychiatric:         Mood and Affect: Mood normal.         Behavior: Behavior normal.          Patient was seen in office for evaluation of bilateral pedal edema. On exam patient does have swollen feet just above the ankle line. There is some redness. Does not appear to be any infection. Patient was started on amlodipine 10 mg a month ago. Patient reports the swelling began about 2 to 3 weeks ago. Possibly could be in relation to amlodipine. He denies any shortness of breath, his lungs are clear. Again there is no signs on symptoms of cellulitis. I have decreased his dose of amlodipine to 5 mg and encouraged him to elevate his feet, wear compression stockings and decrease sodium intake. I have asked him to monitor his blood pressure at home.   I have asked him to follow-up in 2 weeks with blood pressure readings and for evaluation. If edema continues or worsens he should be seen sooner. He has no other complaints at this time. Review of Systems   Constitutional: Negative for fever and malaise/fatigue. HENT: Negative for congestion, sinus pain and sore throat. Eyes: Negative. Respiratory: Negative for cough and shortness of breath. Cardiovascular: Positive for leg swelling. Negative for chest pain. Gastrointestinal: Negative for diarrhea, nausea and vomiting. Genitourinary: Negative for dysuria. Musculoskeletal: Negative. Skin: Negative. Neurological: Negative for dizziness and headaches. Endo/Heme/Allergies: Negative for environmental allergies. Psychiatric/Behavioral: Negative. No results found. No results found for this or any previous visit (from the past 24 hour(s)). Assessment and Plan:    Encounter Diagnoses   Name Primary?  Pedal edema Yes    Essential hypertension                 I have discussed the diagnosis with the patient and the intended plan as seen in the above orders. he has expressed understanding. The patient has received an after-visit summary and questions were answered concerning future plans. I have discussed medication side effects and warnings with the patient as well.         Electronically Signed: Jez Jackson NP

## 2021-12-10 ENCOUNTER — TELEPHONE (OUTPATIENT)
Dept: FAMILY MEDICINE CLINIC | Age: 76
End: 2021-12-10

## 2021-12-10 NOTE — TELEPHONE ENCOUNTER
Faxed to number [phone]        ----- Message from Dean Ford sent at 12/10/2021 12:16 PM EST -----  Subject: Referral Request    QUESTIONS   Reason for referral request? Requesting referral be sent to a neurologist,   Berto Morgan for [email or web address].   Has the physician seen you for this condition before? No   Preferred Specialist (if applicable)? Do you already have an appointment scheduled? Yes  Select Scheduled Date? 2021-12-14  Select Scheduled Physician? Outside Physician - Vicki Epst  Additional Information for Provider?   ---------------------------------------------------------------------------  --------------  [address]  What is the best way for the office to contact you? OK to leave message on   voicemail  Preferred Call Back Phone Number?  [phone]

## 2021-12-14 ENCOUNTER — OFFICE VISIT (OUTPATIENT)
Dept: NEUROLOGY | Age: 76
End: 2021-12-14
Payer: MEDICARE

## 2021-12-14 VITALS
RESPIRATION RATE: 16 BRPM | OXYGEN SATURATION: 98 % | HEART RATE: 67 BPM | WEIGHT: 215 LBS | TEMPERATURE: 97.2 F | SYSTOLIC BLOOD PRESSURE: 109 MMHG | HEIGHT: 69 IN | DIASTOLIC BLOOD PRESSURE: 70 MMHG | BODY MASS INDEX: 31.84 KG/M2

## 2021-12-14 DIAGNOSIS — G60.0 CMT (CHARCOT-MARIE-TOOTH DISEASE): ICD-10-CM

## 2021-12-14 DIAGNOSIS — R41.3 MEMORY DIFFICULTY: ICD-10-CM

## 2021-12-14 DIAGNOSIS — R41.89 COGNITIVE DECLINE: Primary | ICD-10-CM

## 2021-12-14 DIAGNOSIS — R41.0 CONFUSION: ICD-10-CM

## 2021-12-14 PROCEDURE — G8432 DEP SCR NOT DOC, RNG: HCPCS | Performed by: PSYCHIATRY & NEUROLOGY

## 2021-12-14 PROCEDURE — G8417 CALC BMI ABV UP PARAM F/U: HCPCS | Performed by: PSYCHIATRY & NEUROLOGY

## 2021-12-14 PROCEDURE — 1101F PT FALLS ASSESS-DOCD LE1/YR: CPT | Performed by: PSYCHIATRY & NEUROLOGY

## 2021-12-14 PROCEDURE — 95816 EEG AWAKE AND DROWSY: CPT | Performed by: PSYCHIATRY & NEUROLOGY

## 2021-12-14 PROCEDURE — G8427 DOCREV CUR MEDS BY ELIG CLIN: HCPCS | Performed by: PSYCHIATRY & NEUROLOGY

## 2021-12-14 PROCEDURE — G8752 SYS BP LESS 140: HCPCS | Performed by: PSYCHIATRY & NEUROLOGY

## 2021-12-14 PROCEDURE — G8754 DIAS BP LESS 90: HCPCS | Performed by: PSYCHIATRY & NEUROLOGY

## 2021-12-14 PROCEDURE — G8536 NO DOC ELDER MAL SCRN: HCPCS | Performed by: PSYCHIATRY & NEUROLOGY

## 2021-12-14 PROCEDURE — 3017F COLORECTAL CA SCREEN DOC REV: CPT | Performed by: PSYCHIATRY & NEUROLOGY

## 2021-12-14 PROCEDURE — 99204 OFFICE O/P NEW MOD 45 MIN: CPT | Performed by: PSYCHIATRY & NEUROLOGY

## 2021-12-14 NOTE — PROGRESS NOTES
[facility] Neurology Clinics and [address] at [facility] Neurology Clinics at [address] [facility], [address] [facility] [address] [facility], [address] [place] [address]  [phone] Office  [phone]           Referring: Helene Green MD  [address] [facility],  [address]    Chief Complaint   Patient presents with    New Patient    Dementia     issues with memory and \"losing touch with reality\"    Peripheral Neuropathy     62-year-old gentleman presents today accompanied by his wife for evaluation of progressive cognitive decline/confusion. Both provide history. The patient has a degree in engineering and a masters degree in business. He has had progressive cognitive decline short-term greater than long-term. Is been ongoing for several years but worsening of late. Wife notes that this become quite stressful and significantly worse. He says that he is having trouble remembering conversations. He repeats things. He has not been driving. They sold his car in January but he still has his license to use his ID. He has difficulty remembering people's names. Wife says that he will get [employer] cards and does not remember who the people are that sent them. These are people that he went to school with and has had long-term relationships with. He will tell the same story. He asked the same questions over and over. He gets in arguments with the neighbors. He has become paranoid thinking that she is having an affair. He also has Charcot-Lucero-Tooth. He was followed down at [facility]. He saw about 6 different neurologist.  He has not had any evaluation for memory. His mother and maternal grandmother had dementia. Record review finds patient was seen by Dr. Sky Meza back in February 2015 with EMG demonstrating Charcot-Lucero-Tooth and he was referred down to [facility] neuromuscular department.   This examination demonstrated full strength except weakness in the intrinsic hand muscles 4 - and plantar flexors of the feet. He also had atrophy of the intrinsic hand muscles and muscles below the knees bilaterally. He had diminished sensation. Laboratory analysis May 2138  Metabolic panel unremarkable  CBC unremarkable  Urinalysis unremarkable  Hemoglobin A1c 6.1  TSH normal  Lipid panel with   Past Medical History:   Diagnosis Date    Arthritis     cervical spine    Calculus of kidney     Charcot-Lucero-Tooth disease     type 2    GERD (gastroesophageal reflux disease)     Hypercholesterolemia     Memory change     Peripheral sensory-motor axonal polyneuropathy     Skin cancer     SCC on chest    Sun-damaged skin     Sunburn, blistering     Tinnitus        Past Surgical History:   Procedure Laterality Date    HX ORTHOPAEDIC      R grt toe surgery       Current Outpatient Medications   Medication Sig Dispense Refill    amLODIPine (NORVASC) 5 mg tablet Take 1 Tablet by mouth daily. Indications: high blood pressure 30 Tablet 0    sucralfate (CARAFATE) 1 gram tablet Take 1 Tablet by mouth three (3) times daily. 90 Tablet 5    rosuvastatin (CRESTOR) 10 mg tablet Take 1 Tablet by mouth nightly. 90 Tablet 3    lisinopril-hydroCHLOROthiazide (PRINZIDE, ZESTORETIC) 10-12.5 mg per tablet TAKE ONE TABLET BY MOUTH TWICE A  Tablet 1    pantoprazole (PROTONIX) 40 mg tablet Take 1 Tablet by mouth daily. 90 Tablet 3    Blood Pressure Test Kit-Large ([employer] Arm BP Monitor) kit Check bp daily (Patient not taking: Reported on 10/25/2021) 1 Kit 0        No Known Allergies    Social History     Tobacco Use    Smoking status: Former Smoker     Packs/day: 0.00     Years: 2.00     Pack years: 0.00     Types: Cigars     Quit date: 1997     Years since quittin.1    Smokeless tobacco: Never Used   Vaping Use    Vaping Use: Never used   Substance Use Topics    Alcohol use:  Yes     Alcohol/week: 7.0 standard drinks     Types: 7 Glasses of wine per week     Comment: 1 glass of wine daily     Drug use: No       Family History   Problem Relation Age of Onset    Heart Disease Mother     Dementia Mother     Diabetes Maternal Uncle     Alzheimer's Disease Maternal Grandmother     Diabetes Other     Cancer Neg Hx     Hypertension Neg Hx        Review of Systems  Pertinent positives and negatives as noted. Examination  Visit Vitals  /70 (BP 1 Location: Left upper arm, BP Patient Position: Sitting, BP Cuff Size: Adult)   Pulse 67   Temp 97.2 °F (36.2 °C)   Resp 16   Ht 5' 9\" (1.753 m)   Wt 97.5 kg (215 lb)   SpO2 98%   BMI 31.75 kg/m²     He is is pleasant and interactive. He is oriented to December 17, 2021. He recalls the current president is Chip and the previous president is Barbara. Registration 3/3 recall 0/3 but when I give him clues 2/3. He correctly calculates the number of quarters in $1.75. He correctly identifies the floor. Knows were in [place]. Spells the word house forward and backward. Follows all commands. Cranial nerves intact 2 through 12. No nystagmus. No pronation and no drift. Muscle atrophy in the hand muscles and proximal upper extremities. Resists fully in all muscle groups except for hands visit hand muscles. Also has bilateral foot drops. Reflexes depressed globally. No ataxia. Uses a walker. Impression/Plan  Progressive cognitive decline worsening as above with differential diagnosis being age-related cognitive decline versus mild cognitive impairment versus dementing process versus structural versus vascular versus psychological versus other  MRI of the brain  EEG  Carotid Doppler  Formal cognitive eval    Charcot-Lucero-Tooth  Continue with assistive device    Follow-up after testing    Maude Lees MD          This note was created using voice recognition software. Despite editing, there may be syntax errors.

## 2021-12-14 NOTE — PROGRESS NOTES
Chief Complaint   Patient presents with    New Patient    Dementia     issues with memory and \"losing touch with reality\"    Peripheral Neuropathy

## 2021-12-14 NOTE — PATIENT INSTRUCTIONS
RESULT POLICY      If we have ordered testing for you, know that; \"NO NEWS IS GOOD NEWS! \"     It is our policy that we no longer call patients with results, nor do we  give test results over the phone. We schedule follow up appointments so that your results can be discussed in person. This allows you to address any questions you have regarding the results. If you choose to go to an imaging center outside of [facility], it is your responsibility to bring imaging report and disc to follow up appointment. If something of concern is revealed on your test, we will contact you to discuss the matter and if needed schedule a sooner follow up appointment. Additionally, results may be found by using the My Chart feature and one of our patient service representatives at the  can give you instructions on how to access this feature to utilize our electronic medical record system. Thank you for your understanding. [facility]   Statement to Patients  April 1, 2014      In an effort to ensure the large volume of patient prescription refills is processed in the most efficient and expeditious manner, we are asking our patients to assist us by calling your Pharmacy for all prescription refills, this will include also your  Mail Order Pharmacy. The pharmacy will contact our office electronically to continue the refill process. Please do not wait until the last minute to call your pharmacy. We need at least 48 hours (2days) to fill prescriptions. We also encourage you to call your pharmacy before going to  your prescription to make sure it is ready. With regard to controlled substance prescription refill requests (narcotic refills) that need to be picked up at our office, we ask your cooperation by providing us with at least 72 hours (3days) notice that you will need a refill.     We will not refill narcotic prescription refill requests after 4:00pm on any weekday, Monday through Thursday, or after 2:00pm on Fridays, or on the weekends. We encourage everyone to explore another way of getting your prescription refill request processed using [employer], our patient web portal through our electronic medical record system. [employer] is an efficient and effective way to communicate your medication request directly to the office and  downloadable as an alice on your smart phone . [employer] also features a review functionality that allows you to view your medication list as well as leave messages for your physician. Are you ready to get connected? If so please review the attatched instructions or speak to any of our staff to get you set up right away! Thank you so much for your cooperation. Should you have any questions please contact our Practice Administrator.

## 2021-12-28 ENCOUNTER — HOSPITAL ENCOUNTER (OUTPATIENT)
Dept: MRI IMAGING | Age: 76
Discharge: HOME | End: 2021-12-28
Attending: PSYCHIATRY & NEUROLOGY
Payer: MEDICARE

## 2021-12-28 DIAGNOSIS — R41.0 CONFUSION: ICD-10-CM

## 2021-12-28 DIAGNOSIS — R41.3 MEMORY DIFFICULTY: ICD-10-CM

## 2021-12-28 DIAGNOSIS — R41.89 COGNITIVE DECLINE: ICD-10-CM

## 2021-12-28 PROCEDURE — 70551 MRI BRAIN STEM W/O DYE: CPT

## 2022-01-04 ENCOUNTER — OFFICE VISIT (OUTPATIENT)
Dept: NEUROLOGY | Age: 77
End: 2022-01-04

## 2022-01-04 DIAGNOSIS — R41.0 CONFUSION: Primary | ICD-10-CM

## 2022-01-07 NOTE — PROCEDURES
EEG:      Date:  01/04/22    Requesting Physician:  Yin Painter MD     An EEG is requested in this 63-year-old man with confusion to evaluate for epileptiform abnormality. Medications:  Medications are said to include Crestor, Protonix, Prinzide, Carafate, Norvasc. This tracing is obtained during the awake state. During wakefulness there are intermittent runs of posteriorly dominant and symmetric low to medium amplitude 10 cycle per second activities, which attenuate with eye opening. Lower voltage, faster frequency activities are seen symmetrically over the anterior head regions. Hyperventilation is not performed. Intermittent photic stimulation little alters the tracing. Sleep is not attained. Interpretation:   This EEG recorded during the awake state is normal.

## 2022-02-10 DIAGNOSIS — K21.00 GASTROESOPHAGEAL REFLUX DISEASE WITH ESOPHAGITIS WITHOUT HEMORRHAGE: ICD-10-CM

## 2022-02-10 RX ORDER — SUCRALFATE 1 G/1
TABLET ORAL
Qty: 270 TABLET | Refills: 5 | Status: SHIPPED | OUTPATIENT
Start: 2022-02-10

## 2022-03-10 ENCOUNTER — OFFICE VISIT (OUTPATIENT)
Dept: NEUROLOGY | Age: 77
End: 2022-03-10
Payer: MEDICARE

## 2022-03-10 VITALS
WEIGHT: 215 LBS | SYSTOLIC BLOOD PRESSURE: 117 MMHG | HEART RATE: 73 BPM | TEMPERATURE: 97.9 F | RESPIRATION RATE: 14 BRPM | DIASTOLIC BLOOD PRESSURE: 72 MMHG | BODY MASS INDEX: 31.75 KG/M2 | OXYGEN SATURATION: 96 %

## 2022-03-10 DIAGNOSIS — R41.0 CONFUSION: ICD-10-CM

## 2022-03-10 DIAGNOSIS — R41.3 MEMORY DIFFICULTY: Primary | ICD-10-CM

## 2022-03-10 DIAGNOSIS — G30.9 ALZHEIMER'S DISEASE, UNSPECIFIED (CODE) (HCC): ICD-10-CM

## 2022-03-10 PROCEDURE — G8510 SCR DEP NEG, NO PLAN REQD: HCPCS | Performed by: PSYCHIATRY & NEUROLOGY

## 2022-03-10 PROCEDURE — G8417 CALC BMI ABV UP PARAM F/U: HCPCS | Performed by: PSYCHIATRY & NEUROLOGY

## 2022-03-10 PROCEDURE — 1101F PT FALLS ASSESS-DOCD LE1/YR: CPT | Performed by: PSYCHIATRY & NEUROLOGY

## 2022-03-10 PROCEDURE — G8536 NO DOC ELDER MAL SCRN: HCPCS | Performed by: PSYCHIATRY & NEUROLOGY

## 2022-03-10 PROCEDURE — G8427 DOCREV CUR MEDS BY ELIG CLIN: HCPCS | Performed by: PSYCHIATRY & NEUROLOGY

## 2022-03-10 PROCEDURE — 99214 OFFICE O/P EST MOD 30 MIN: CPT | Performed by: PSYCHIATRY & NEUROLOGY

## 2022-03-10 PROCEDURE — 3017F COLORECTAL CA SCREEN DOC REV: CPT | Performed by: PSYCHIATRY & NEUROLOGY

## 2022-03-10 RX ORDER — MEMANTINE HYDROCHLORIDE 10 MG/1
10 TABLET ORAL 2 TIMES DAILY
Qty: 60 TABLET | Refills: 3 | Status: SHIPPED | OUTPATIENT
Start: 2022-03-10 | End: 2022-07-04

## 2022-03-10 RX ORDER — MEMANTINE HYDROCHLORIDE 5 MG/1
TABLET ORAL
Qty: 42 TABLET | Refills: 0 | Status: SHIPPED | OUTPATIENT
Start: 2022-03-10

## 2022-03-10 NOTE — PROGRESS NOTES
[facility] Neurology Clinics and  [address] at [facility] Neurology Clinics at [address] [address] [address] [facility], [address] [place] [address]   [phone]              Chief Complaint   Patient presents with    Altered mental status     episodes of hallucinations, anxiety, anxiousness, and confusion becoming more frequent     Current Outpatient Medications   Medication Sig Dispense Refill    memantine (Namenda) 5 mg tablet 1 po every day x 7 then 1 po bid x 7 the 1 qam and 2 qpm x 7 then start 10mg bid  Titration Rx DO NOT REFILL 42 Tablet 0    memantine (Namenda) 10 mg tablet Take 1 Tablet by mouth two (2) times a day. 60 Tablet 3    sucralfate (CARAFATE) 1 gram tablet TAKE ONE TABLET BY MOUTH THREE TIMES A  Tablet 5    amLODIPine (NORVASC) 5 mg tablet Take 1 Tablet by mouth daily. Indications: high blood pressure 30 Tablet 0    rosuvastatin (CRESTOR) 10 mg tablet Take 1 Tablet by mouth nightly. 90 Tablet 3    lisinopril-hydroCHLOROthiazide (PRINZIDE, ZESTORETIC) 10-12.5 mg per tablet TAKE ONE TABLET BY MOUTH TWICE A  Tablet 1    pantoprazole (PROTONIX) 40 mg tablet Take 1 Tablet by mouth daily. 90 Tablet 3    Blood Pressure Test Kit-Large ([employer] Arm BP Monitor) kit Check bp daily (Patient not taking: Reported on 10/25/2021) 1 Kit 0      No Known Allergies  Social History     Tobacco Use    Smoking status: Former Smoker     Packs/day: 0.00     Years: 2.00     Pack years: 0.00     Types: Cigars     Quit date: 1997     Years since quittin.3    Smokeless tobacco: Never Used   Vaping Use    Vaping Use: Never used   Substance Use Topics    Alcohol use: Yes     Alcohol/week: 7.0 standard drinks     Types: 7 Glasses of wine per week     Comment: 1 glass of wine daily     Drug use:  No     79-year-old gentleman returns today for follow-up after his recent initial consultation for complaints of cognitive decline and confusion. We sent him for several tests  Carotid Doppler just completed and less than 50% stenosis bilaterally  MRI of the brain personally reviewed and unremarkable although he has some atrophy in the bilateral hippocampi mesial temporal lobes  EEG was normal with a 10 cps background  He has a neuropsychological evaluation but that is not scheduled until August  His wife is with him today. She notes that symptoms are worsening. More cognitive decline and confusion. Some agitation. He says his short-term memory is poor    Examination  Visit Vitals  /72 (BP 1 Location: Left upper arm, BP Patient Position: Sitting, BP Cuff Size: Adult)   Pulse 73   Temp 97.9 °F (36.6 °C)   Resp 14   Wt 97.5 kg (215 lb)   SpO2 96%   BMI 31.75 kg/m²   Awake and alert. Engaging. Uses a walker. Impression/Plan  Progressive cognitive decline with worsening as noted  Evaluation thus far unremarkable  Await neuropsychological evaluation however since that is going to be sometime from now we will Sonnye 81 and certainly if the neuropsych eval comes back not demonstrative of a dementing process no harm is done but I would rather have him on medicine to help stabilize memory while we await completion of his evaluation    Debbie Ramirez MD        This note was created using voice recognition software. Despite editing, there may be syntax errors.

## 2022-03-18 PROBLEM — E78.9 BORDERLINE HIGH SERUM CHOLESTEROL: Status: ACTIVE | Noted: 2020-02-25

## 2022-03-19 PROBLEM — R73.09 ELEVATED GLUCOSE: Status: ACTIVE | Noted: 2017-12-15

## 2022-03-19 PROBLEM — G30.9 ALZHEIMER'S DISEASE, UNSPECIFIED (CODE) (HCC): Status: ACTIVE | Noted: 2022-03-10

## 2022-03-20 PROBLEM — R41.3 MEMORY CHANGE: Status: ACTIVE | Noted: 2019-08-08

## 2022-03-20 PROBLEM — E78.5 HYPERLIPIDEMIA LDL GOAL <100: Status: ACTIVE | Noted: 2017-06-16

## 2022-05-17 ENCOUNTER — OFFICE VISIT (OUTPATIENT)
Dept: FAMILY MEDICINE CLINIC | Age: 77
End: 2022-05-17
Payer: MEDICARE

## 2022-05-17 VITALS
RESPIRATION RATE: 18 BRPM | TEMPERATURE: 97.9 F | DIASTOLIC BLOOD PRESSURE: 54 MMHG | HEART RATE: 69 BPM | HEIGHT: 69 IN | OXYGEN SATURATION: 97 % | WEIGHT: 198 LBS | SYSTOLIC BLOOD PRESSURE: 109 MMHG | BODY MASS INDEX: 29.33 KG/M2

## 2022-05-17 DIAGNOSIS — Z23 ENCOUNTER FOR IMMUNIZATION: ICD-10-CM

## 2022-05-17 DIAGNOSIS — E78.2 MIXED HYPERLIPIDEMIA: ICD-10-CM

## 2022-05-17 DIAGNOSIS — I10 ESSENTIAL HYPERTENSION: ICD-10-CM

## 2022-05-17 DIAGNOSIS — R73.03 PREDIABETES: Primary | ICD-10-CM

## 2022-05-17 DIAGNOSIS — R60.0 PEDAL EDEMA: ICD-10-CM

## 2022-05-17 DIAGNOSIS — R35.0 BENIGN PROSTATIC HYPERPLASIA WITH URINARY FREQUENCY: ICD-10-CM

## 2022-05-17 DIAGNOSIS — N40.1 BENIGN PROSTATIC HYPERPLASIA WITH URINARY FREQUENCY: ICD-10-CM

## 2022-05-17 DIAGNOSIS — R73.01 IFG (IMPAIRED FASTING GLUCOSE): ICD-10-CM

## 2022-05-17 DIAGNOSIS — Z12.5 SCREENING FOR MALIGNANT NEOPLASM OF PROSTATE: ICD-10-CM

## 2022-05-17 PROCEDURE — 99214 OFFICE O/P EST MOD 30 MIN: CPT | Performed by: FAMILY MEDICINE

## 2022-05-17 PROCEDURE — 3017F COLORECTAL CA SCREEN DOC REV: CPT | Performed by: FAMILY MEDICINE

## 2022-05-17 PROCEDURE — G8417 CALC BMI ABV UP PARAM F/U: HCPCS | Performed by: FAMILY MEDICINE

## 2022-05-17 PROCEDURE — 1101F PT FALLS ASSESS-DOCD LE1/YR: CPT | Performed by: FAMILY MEDICINE

## 2022-05-17 PROCEDURE — G8754 DIAS BP LESS 90: HCPCS | Performed by: FAMILY MEDICINE

## 2022-05-17 PROCEDURE — G8752 SYS BP LESS 140: HCPCS | Performed by: FAMILY MEDICINE

## 2022-05-17 PROCEDURE — G8536 NO DOC ELDER MAL SCRN: HCPCS | Performed by: FAMILY MEDICINE

## 2022-05-17 PROCEDURE — G8510 SCR DEP NEG, NO PLAN REQD: HCPCS | Performed by: FAMILY MEDICINE

## 2022-05-17 PROCEDURE — G8427 DOCREV CUR MEDS BY ELIG CLIN: HCPCS | Performed by: FAMILY MEDICINE

## 2022-05-17 RX ORDER — ZOSTER VACCINE RECOMBINANT, ADJUVANTED 50 MCG/0.5
0.5 KIT INTRAMUSCULAR ONCE
Qty: 0.5 ML | Refills: 0 | Status: SHIPPED | OUTPATIENT
Start: 2022-05-17 | End: 2022-05-17

## 2022-05-17 RX ORDER — ROSUVASTATIN CALCIUM 10 MG/1
10 TABLET, COATED ORAL
Qty: 90 TABLET | Refills: 3 | Status: SHIPPED | OUTPATIENT
Start: 2022-05-17

## 2022-05-17 RX ORDER — LISINOPRIL AND HYDROCHLOROTHIAZIDE 10; 12.5 MG/1; MG/1
2 TABLET ORAL DAILY
Qty: 180 TABLET | Refills: 1 | Status: SHIPPED | OUTPATIENT
Start: 2022-05-17

## 2022-05-17 RX ORDER — TAMSULOSIN HYDROCHLORIDE 0.4 MG/1
0.4 CAPSULE ORAL DAILY
Qty: 90 CAPSULE | Refills: 0 | Status: SHIPPED | OUTPATIENT
Start: 2022-05-17 | End: 2022-08-12 | Stop reason: SDUPTHER

## 2022-05-17 NOTE — PROGRESS NOTES
2022   Katia Francisco (: 1945) is a 78 y.o. male, established patient, here for evaluation of the following chief complaint(s): Annual Wellness Visit     ASSESSMENT/PLAN:  Below is the assessment and plan developed based on review of pertinent history, physical exam, labs, studies, and medications. 1. Prediabetes  -     CBC WITH AUTOMATED DIFF; Future  -     METABOLIC PANEL, COMPREHENSIVE; Future  -     HEMOGLOBIN A1C WITH EAG; Future  -     MICROALBUMIN, UR, RAND W/ MICROALB/CREAT RATIO; Future  2. Essential hypertension  -     lisinopril-hydroCHLOROthiazide (PRINZIDE, ZESTORETIC) 10-12.5 mg per tablet; Take 2 Tablets by mouth daily. , Normal, Disp-180 Tablet, R-1  -     THYROID CASCADE PROFILE; Future  3. Mixed hyperlipidemia  -     rosuvastatin (CRESTOR) 10 mg tablet; Take 1 Tablet by mouth nightly., Normal, Disp-90 Tablet, R-3  -     METABOLIC PANEL, COMPREHENSIVE; Future  -     LIPID PANEL; Future  4. Pedal edema  -     Comp Stocking,Knee,Regular,Sml misc; Use daily for leg swelling. Compression [id number], Print, Disp-2 Each, R-0  5. IFG (impaired fasting glucose)  -     CBC WITH AUTOMATED DIFF; Future  -     METABOLIC PANEL, COMPREHENSIVE; Future  -     URINALYSIS W/ REFLEX CULTURE; Future  -     HEMOGLOBIN A1C WITH EAG; Future  -     MICROALBUMIN, UR, RAND W/ MICROALB/CREAT RATIO; Future  6. Benign prostatic hyperplasia with urinary frequency  -     tamsulosin (Flomax) 0.4 mg capsule; Take 1 Capsule by mouth daily. , Normal, Disp-90 Capsule, R-0  -     REFERRAL TO UROLOGY  -     PSA SCREENING (SCREENING); Future  7.  Encounter for immunization  -     diph,Pertuss,Acell,,Tet Vac-PF (ADACEL) 2 Lf-(2.5-5-3-5 mcg)-5Lf/0.5 mL susp; 0.5 mL by IntraMUSCular route once for 1 dose., Print, Disp-1 Each, R-0  -     pneumococcal 23-valent (PNEUMOVAX 23) 25 mcg/0.5 mL injection; 0.5 mL by IntraMUSCular route once for 1 dose., Normal, Disp-0.5 mL, R-0  -     varicella-zoster recombinant, PF, (Shingrix, PF,) 50 mcg/0.5 mL susr injection; 0.5 mL by IntraMUSCular route once for 1 dose., Print, Disp-0.5 mL, R-0  8. Screening for malignant neoplasm of prostate  -     PSA SCREENING (SCREENING); Future    Return in about 6 months (around 11/17/2022) for [address] [facility] [address]:  HPI   1. Essential hypertension  Advised patient to stop amlodipine due to bilateral ankle swelling. Keep blood pressure log. Follow-up if no improvement. 2. Mixed hyperlipidemia  Check labs. Currently taking Crestor. Does not exercise. 3. Pedal edema  Continues to have ankle swelling bilaterally. I will stop amlodipine. Start compression stockings. Advised to follow-up if no improvement. 4. Prediabetes  5. IFG (impaired fasting glucose)  Check labs. 6. Benign prostatic hyperplasia with urinary frequency  Patient reports symptoms of increased urinary frequency, nighttime urination. Patient also complains of pelvic pressure. Denies dysuria. I will start Flomax. Check urine for culture. Refer to urologist.  7. Encounter for immunization  Not up-to-date on immunizations    8. Screening for malignant neoplasm of prostate  Check PSA      No results found for any visits on 05/17/22. Review of Systems   Constitutional: Negative. HENT: Negative. Eyes: Negative. Respiratory: Negative. Cardiovascular: Negative. Gastrointestinal: Negative. Genitourinary: Positive for frequency. Musculoskeletal: Negative. Skin: Negative. Neurological: Negative. Endo/Heme/Allergies: Negative. Psychiatric/Behavioral: Negative. Physical Exam  Vitals and nursing note reviewed. HENT:      Head: Normocephalic and atraumatic. Right Ear: External ear normal.      Left Ear: External ear normal.      Nose: Nose normal.   Eyes:      Conjunctiva/sclera: Conjunctivae normal.   Cardiovascular:      Rate and Rhythm: Normal rate and regular rhythm.    Pulmonary:      Effort: Pulmonary effort is normal. Breath sounds: Normal breath sounds. Abdominal:      General: Bowel sounds are normal. There is no distension. Palpations: Abdomen is soft. Tenderness: There is no abdominal tenderness. Musculoskeletal:         General: Normal range of motion. Cervical back: Normal range of motion and neck supple. Right lower leg: Edema present. Left lower leg: Edema present. Skin:     General: Skin is warm and dry. Neurological:      General: No focal deficit present. Mental Status: He is alert. BP (!) 109/54   Pulse 69   Temp 97.9 °F (36.6 °C) (Temporal)   Resp 18   Ht 5' 9\" (1.753 m)   Wt 198 lb (89.8 kg)   SpO2 97%   BMI 29.24 kg/m²     No Known Allergies    Current Outpatient Medications   Medication Sig    tamsulosin (Flomax) 0.4 mg capsule Take 1 Capsule by mouth daily.  lisinopril-hydroCHLOROthiazide (PRINZIDE, ZESTORETIC) 10-12.5 mg per tablet Take 2 Tablets by mouth daily.  rosuvastatin (CRESTOR) 10 mg tablet Take 1 Tablet by mouth nightly.  diph,Pertuss,Acell,,Tet Vac-PF (ADACEL) 2 Lf-(2.5-5-3-5 mcg)-5Lf/0.5 mL susp 0.5 mL by IntraMUSCular route once for 1 dose.  pneumococcal 23-valent (PNEUMOVAX 23) 25 mcg/0.5 mL injection 0.5 mL by IntraMUSCular route once for 1 dose.  varicella-zoster recombinant, PF, (Shingrix, PF,) 50 mcg/0.5 mL susr injection 0.5 mL by IntraMUSCular route once for 1 dose.  Comp Stocking,Knee,Regular,Sml misc Use daily for leg swelling. Compression [id number]    pantoprazole (PROTONIX) 40 mg tablet Take 1 Tablet by mouth daily.  memantine (Namenda) 5 mg tablet 1 po every day x 7 then 1 po bid x 7 the 1 qam and 2 qpm x 7 then start 10mg bid  Titration Rx DO NOT REFILL    memantine (Namenda) 10 mg tablet Take 1 Tablet by mouth two (2) times a day.     sucralfate (CARAFATE) 1 gram tablet TAKE ONE TABLET BY MOUTH THREE TIMES A DAY    Blood Pressure Test Kit-Large ([employer] Arm BP Monitor) kit Check bp daily (Patient not taking: Reported on 10/25/2021)     No current facility-administered medications for this visit. Past Medical History:   Diagnosis Date    Arthritis     cervical spine    Calculus of kidney     Charcot-Lucero-Tooth disease     type 2    GERD (gastroesophageal reflux disease)     Hypercholesterolemia     Memory change     Peripheral sensory-motor axonal polyneuropathy     Skin cancer     SCC on chest    Sun-damaged skin     Sunburn, blistering     Tinnitus        Past Surgical History:   Procedure Laterality Date    HX ORTHOPAEDIC      R grt toe surgery       Social History:  reports that he quit smoking about 24 years ago. His smoking use included cigars. He smoked 0.00 packs per day for 2.00 years. He has never used smokeless tobacco. He reports current alcohol use of about 7.0 standard drinks of alcohol per week. He reports that he does not use drugs. Patient Care Team:  Susan Nolasco MD as PCP - General (Family Medicine)  Susan Nolasco MD as PCP - [address] [place] [address] Dr Leblanc Provider    Problem List  Date Reviewed: 12/2/2021          Codes Class Noted    Alzheimer's disease, unspecified ICD-10-CM: G30.9  ICD-9-CM: 331.0  3/10/2022        Borderline high serum cholesterol ICD-10-CM: E78.9  ICD-9-CM: 272.9  2/25/2020        Memory change ICD-10-CM: R41.3  ICD-9-CM: 780.93  8/8/2019        Elevated glucose ICD-10-CM: R73.09  ICD-9-CM: 790.29  12/15/2017        Hyperlipidemia LDL goal <100 ICD-10-CM: E78.5  ICD-9-CM: 272.4  6/16/2017        Advanced care planning/counseling discussion ICD-10-CM: Z71.89  ICD-9-CM: V65.49  12/16/2016    Overview Signed 12/16/2016  4:10 PM by Betty Ford RN     Patient states that a completed AMD is at home. NN encouraged patient to bring a copy to the office for scanning into the medical record. Patient verbalized understanding & agreement.                CMT (Charcot-Lucero-Tooth disease) (Chronic) ICD-10-CM: G60.0  ICD-9-CM: 356.1  12/17/2015    Overview Signed 12/17/2015  2:54 PM by Henrietta Leos             Brachial neuritis or radiculitis NOS ICD-10-CM: M54.12  ICD-9-CM: 723.4  12/10/2014        Thoracic or lumbosacral neuritis or radiculitis, unspecified ICD-10-CM: [id number]  ICD-9-CM: 724.4  12/10/2014        Idiopathic progressive polyneuropathy ICD-10-CM: G60.3  ICD-9-CM: 356.4  12/10/2014        Prediabetes ICD-10-CM: R73.03  ICD-9-CM: 790.29  11/12/2013        GERD (gastroesophageal reflux disease) ICD-10-CM: K21.9  ICD-9-CM: 530.81  9/12/2013        Kidney stones (Chronic) ICD-10-CM: N20.0  ICD-9-CM: 592.0  9/12/2013        Squamous cell skin cancer ICD-10-CM: C44.92  ICD-9-CM: 173.92  9/12/2013    Overview Addendum 11/17/2014 10:33 AM by Henrietta Mccray     Chest -  Dr. Ibrahim Body               Fracture, cervical vertebra [facility]) ICD-10-CM: S12. 9XXA  ICD-9-CM: 805.00  11/17/1969                   I ADVISED PATIENT TO GO TO ER IF SYMPTOMS WORSEN , CHANGE OR FAILS TO IMPROVE. I have discussed the diagnosis with the patient and the intended plan as seen in the above orders. The patient has received an after-visit summary and questions were answered concerning future plans. I have discussed medication side effects and warnings with the patient as well. The patient agrees and understands above plan. An electronic signature was used to authenticate this note.   -- Arturo Arenas MD

## 2022-05-17 NOTE — PROGRESS NOTES
Maryann Fenton is a 78 y.o. male      Chief Complaint   Patient presents with   [facility] Wellness Visit         1. Have you been to the ER, urgent care clinic since your last visit? Hospitalized since your last visit? No       2. Have you seen or consulted any other health care providers outside of the [address] [place] [address] since your last visit? Include any pap smears or colon screening.   No

## 2022-05-19 LAB
ALBUMIN SERPL-MCNC: 4.4 G/DL (ref 3.7–4.7)
ALBUMIN/CREAT UR: 4 MG/G CREAT (ref 0–29)
ALBUMIN/GLOB SERPL: 1.7 {RATIO} (ref 1.2–2.2)
ALP SERPL-CCNC: 68 IU/L (ref 44–121)
ALT SERPL-CCNC: 19 IU/L (ref 0–44)
APPEARANCE UR: CLEAR
AST SERPL-CCNC: 21 IU/L (ref 0–40)
BACTERIA #/AREA URNS HPF: NORMAL /[HPF]
BASOPHILS # BLD AUTO: 0.1 X10E3/UL (ref 0–0.2)
BASOPHILS NFR BLD AUTO: 1 %
BILIRUB SERPL-MCNC: 0.4 MG/DL (ref 0–1.2)
BILIRUB UR QL STRIP: NEGATIVE
BUN SERPL-MCNC: 12 MG/DL (ref 8–27)
BUN/CREAT SERPL: 16 (ref 10–24)
CALCIUM SERPL-MCNC: 9.6 MG/DL (ref 8.6–10.2)
CASTS URNS QL MICRO: NORMAL /LPF
CHLORIDE SERPL-SCNC: 95 MMOL/L (ref 96–106)
CHOLEST SERPL-MCNC: 133 MG/DL (ref 100–199)
CO2 SERPL-SCNC: 22 MMOL/L (ref 20–29)
COLOR UR: YELLOW
CREAT SERPL-MCNC: 0.77 MG/DL (ref 0.76–1.27)
CREAT UR-MCNC: 90.4 MG/DL
EGFR: 93 ML/MIN/1.73
EOSINOPHIL # BLD AUTO: 0.1 X10E3/UL (ref 0–0.4)
EOSINOPHIL NFR BLD AUTO: 1 %
EPI CELLS #/AREA URNS HPF: NORMAL /HPF (ref 0–10)
ERYTHROCYTE [DISTWIDTH] IN BLOOD BY AUTOMATED COUNT: 12.4 % (ref 11.6–15.4)
EST. AVERAGE GLUCOSE BLD GHB EST-MCNC: 126 MG/DL
GLOBULIN SER CALC-MCNC: 2.6 G/DL (ref 1.5–4.5)
GLUCOSE SERPL-MCNC: 112 MG/DL (ref 65–99)
GLUCOSE UR QL STRIP: NEGATIVE
HBA1C MFR BLD: 6 % (ref 4.8–5.6)
HCT VFR BLD AUTO: 43.1 % (ref 37.5–51)
HDLC SERPL-MCNC: 61 MG/DL
HGB BLD-MCNC: 14.6 G/DL (ref 13–17.7)
HGB UR QL STRIP: NEGATIVE
IMM GRANULOCYTES # BLD AUTO: 0 X10E3/UL (ref 0–0.1)
IMM GRANULOCYTES NFR BLD AUTO: 0 %
KETONES UR QL STRIP: NEGATIVE
LDLC SERPL CALC-MCNC: 60 MG/DL (ref 0–99)
LEUKOCYTE ESTERASE UR QL STRIP: NEGATIVE
LYMPHOCYTES # BLD AUTO: 1.6 X10E3/UL (ref 0.7–3.1)
LYMPHOCYTES NFR BLD AUTO: 20 %
MCH RBC QN AUTO: 31.6 PG (ref 26.6–33)
MCHC RBC AUTO-ENTMCNC: 33.9 G/DL (ref 31.5–35.7)
MCV RBC AUTO: 93 FL (ref 79–97)
MICRO URNS: NORMAL
MICRO URNS: NORMAL
MICROALBUMIN UR-MCNC: 3.4 UG/ML
MONOCYTES # BLD AUTO: 0.8 X10E3/UL (ref 0.1–0.9)
MONOCYTES NFR BLD AUTO: 10 %
NEUTROPHILS # BLD AUTO: 5.4 X10E3/UL (ref 1.4–7)
NEUTROPHILS NFR BLD AUTO: 68 %
NITRITE UR QL STRIP: NEGATIVE
PH UR STRIP: 7.5 [PH] (ref 5–7.5)
PLATELET # BLD AUTO: 318 X10E3/UL (ref 150–450)
POTASSIUM SERPL-SCNC: 4.5 MMOL/L (ref 3.5–5.2)
PROT SERPL-MCNC: 7 G/DL (ref 6–8.5)
PROT UR QL STRIP: NEGATIVE
PSA SERPL-MCNC: 1.6 NG/ML (ref 0–4)
RBC # BLD AUTO: 4.62 X10E6/UL (ref 4.14–5.8)
RBC #/AREA URNS HPF: NORMAL /HPF (ref 0–2)
SODIUM SERPL-SCNC: 131 MMOL/L (ref 134–144)
SP GR UR STRIP: 1.01 (ref 1–1.03)
TRIGL SERPL-MCNC: 58 MG/DL (ref 0–149)
TSH SERPL DL<=0.005 MIU/L-ACNC: 1.28 UIU/ML (ref 0.45–4.5)
URINALYSIS REFLEX, 377202: NORMAL
UROBILINOGEN UR STRIP-MCNC: 0.2 MG/DL (ref 0.2–1)
VLDLC SERPL CALC-MCNC: 12 MG/DL (ref 5–40)
WBC # BLD AUTO: 7.8 X10E3/UL (ref 3.4–10.8)
WBC #/AREA URNS HPF: NORMAL /HPF (ref 0–5)

## 2022-05-23 DIAGNOSIS — E87.1 HYPONATREMIA: Primary | ICD-10-CM

## 2022-05-23 NOTE — PROGRESS NOTES
Please call inform patient, sodium level is low, patient to follow fluid restriction 800 mils per day. Follow-up in 1 week to repeat sodium labs. Labs ordered in chart.   Thanks

## 2022-05-24 ENCOUNTER — TELEPHONE (OUTPATIENT)
Dept: FAMILY MEDICINE CLINIC | Age: 77
End: 2022-05-24

## 2022-05-24 NOTE — TELEPHONE ENCOUNTER
Please call inform patient, sodium level is low, patient to follow fluid restriction 800 mils per day. Follow-up in 1 week to repeat sodium labs. Labs ordered in chart. Thanks          Mr Percy Stephens was notified and will return for repeat labs.

## 2022-06-02 LAB
BUN SERPL-MCNC: 12 MG/DL (ref 8–27)
BUN/CREAT SERPL: 15 (ref 10–24)
CALCIUM SERPL-MCNC: 10.1 MG/DL (ref 8.6–10.2)
CHLORIDE SERPL-SCNC: 103 MMOL/L (ref 96–106)
CO2 SERPL-SCNC: 27 MMOL/L (ref 20–29)
CREAT SERPL-MCNC: 0.78 MG/DL (ref 0.76–1.27)
EGFR: 93 ML/MIN/1.73
GLUCOSE SERPL-MCNC: 106 MG/DL (ref 65–99)
POTASSIUM SERPL-SCNC: 5.1 MMOL/L (ref 3.5–5.2)
SODIUM SERPL-SCNC: 142 MMOL/L (ref 134–144)

## 2022-06-25 DIAGNOSIS — K21.9 GASTROESOPHAGEAL REFLUX DISEASE WITHOUT ESOPHAGITIS: ICD-10-CM

## 2022-06-27 RX ORDER — PANTOPRAZOLE SODIUM 40 MG/1
TABLET, DELAYED RELEASE ORAL
Qty: 90 TABLET | Refills: 3 | Status: SHIPPED | OUTPATIENT
Start: 2022-06-27

## 2022-07-04 RX ORDER — MEMANTINE HYDROCHLORIDE 10 MG/1
TABLET ORAL
Qty: 60 TABLET | Refills: 3 | Status: SHIPPED | OUTPATIENT
Start: 2022-07-04 | End: 2022-11-02

## 2022-08-10 ENCOUNTER — OFFICE VISIT (OUTPATIENT)
Dept: NEUROLOGY | Age: 77
End: 2022-08-10
Payer: MEDICARE

## 2022-08-10 DIAGNOSIS — F22 PARANOIA (HCC): ICD-10-CM

## 2022-08-10 DIAGNOSIS — F06.4 ANXIETY DISORDER DUE TO GENERAL MEDICAL CONDITION WITH PANIC ATTACK: ICD-10-CM

## 2022-08-10 DIAGNOSIS — F41.0 ANXIETY DISORDER DUE TO GENERAL MEDICAL CONDITION WITH PANIC ATTACK: ICD-10-CM

## 2022-08-10 DIAGNOSIS — G60.0 CMT (CHARCOT-MARIE-TOOTH DISEASE): ICD-10-CM

## 2022-08-10 DIAGNOSIS — G30.9 ALZHEIMER'S DISEASE, UNSPECIFIED (CODE) (HCC): Primary | ICD-10-CM

## 2022-08-10 DIAGNOSIS — R41.3 MEMORY CHANGE: ICD-10-CM

## 2022-08-10 PROCEDURE — 1123F ACP DISCUSS/DSCN MKR DOCD: CPT | Performed by: CLINICAL NEUROPSYCHOLOGIST

## 2022-08-10 PROCEDURE — 90791 PSYCH DIAGNOSTIC EVALUATION: CPT | Performed by: CLINICAL NEUROPSYCHOLOGIST

## 2022-08-10 NOTE — PROGRESS NOTES
[address] [facility],[address]Th [address]  [address]   [place] [address] [facility] [place] [address] [address]   [phone] Office   [phone] Fax      Neuropsychology    Initial Diagnostic Interview Note      Referral:  Vasiliy Rea MD,  Janel Howard is a 78 y.o. right handed   male  who was accompanied by his spouse to the initial clinical interview on  8/10/22. Please refer to his medical records for details pertaining to his history. At the start of the appointment, I reviewed the patient's [facility] Epic Chart (including Media scanned in from previous providers) for the active Problem List, all pertinent Past Medical Hx, medications, recent radiologic and laboratory findings. In addition, I reviewed pt's documented Immunization Record and Encounter History. [facility]) without history of previously diagnosed LD and/or receipt of special education services. 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 He has no known background stroke, meningitis/encephalitis, TAI Fever, Lupus, Lyme, sz. He has had many head injuries. He has progressive memory decline. He has a Master's Degree as well. He first started noticing memory issues about 20 years ago. She says that memory issues have been going on for 20 years and it has been a gradual and it has been much faster now. Forgets conversations. Misplaces things. Starts tasks and does not complete. He is starting to argue with people in the parking lot and was in the neighborhood thinking that others were out to get him. He would panic to the point of shaking. This would last four to fiive minutes. He has not been driving. Wife says that he will get [employer] cards and does not remember who the people are that sent them. He has been on medication for mood since he saw Dr. Kimmy Danielson. He was thinking spouse was having an affair.  He has a hard time using a cell phone.   He also has Charcot-Lucero-Tooth. He has been seen at [facility]. He has seen 7 or so neurologists. His mother and her mother had dementia, starting in their 45s and 46s, per patient. Spouse says it was more in their 62s. 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 Sold car 18 months ago due to memory problems. Doesn't always recall meds. Spouse does the finances. Does his ADLs. He puts the wrong password in all the time. He has a history of being on SSRI for panic? And as on prilosec also for ongoing heartburn and must wonder about somatic anxiety. Record review finds patient was seen by Dr. Ricardo Esposito in 2015 with EMG demonstrating Charcot-Lucero-Tooth and he was referred down to [facility] neuromuscular department. This examination demonstrated full strength except weakness in the intrinsic hand muscles 4 - and plantar flexors of the feet. He also had atrophy of the intrinsic hand muscles and muscles below the knees bilaterally. He had diminished sensation. Has been on Namenda also since they have seen Dr. Hossein Garrett. EXAM: MRI BRAIN WO CONT     INDICATION: cognitive decline/confusion     COMPARISON: CT head 10/26/2013. CONTRAST: None. TECHNIQUE:    Multiplanar multisequence acquisition without contrast of the brain. FINDINGS:  The ventricles are normal in size and position. There is mild disproportionate  atrophy of the bilateral hippocampi and mesial temporal lobes (series 5 image 10  and series 6 image 10). The brain parenchyma otherwise has normal signal  characteristics for age. There is no acute infarct, hemorrhage, extra-axial  fluid collection, or mass effect. There is no cerebellar tonsillar herniation. Expected arterial flow-voids are present. Mild mucosal thickening in the bilateral ethmoidal air cells and right maxillary  sinus. The mastoid air cells and middle ears are clear. The orbital contents are  within normal limits.  No significant osseous or scalp lesions are identified. IMPRESSION     1. Mild disproportionate atrophy of the bilateral hippocampi and mesial temporal  lobes, which may suggest Alzheimer's dementia. Correlate clinically. 2. Otherwise unremarkable brain MRI.          Neuropsychological Mental Status Exam (NMSE):      Historian: fair for short term info  Praxis: No UE apraxia  R/L Orientation: Intact to self and to other  Dress: within normal limits   Weight: within normal limits   Appearance/Hygiene: within normal limits   Gait: slow, bilateral foot drop, with rollator  Assistive Devices: rollator  Mood: within normal limits   Affect: within normal limits   Comprehension: within normal limits   Thought Process: within normal limits   Expressive Language: within normal limits   Receptive Language: within normal limits   Motor:  No cognitive or motor perseveration  ETOH:  rarely, not to excess  Tobacco: Denied  Marijuana: Denied  Illicit: Denied  SI/HI:  Denied  Psychosis: Denied  Insight: Fair  Judgment: Within normal limits  Other Psych: Son has bipolar, daughter with psychotic breaks      Past Medical History:   Diagnosis Date    Arthritis     cervical spine    Calculus of kidney     Charcot-Lucero-Tooth disease     type 2    GERD (gastroesophageal reflux disease)     Hypercholesterolemia     Memory change     Peripheral sensory-motor axonal polyneuropathy     Skin cancer     SCC on chest    Sun-damaged skin     Sunburn, blistering     Tinnitus        Past Surgical History:   Procedure Laterality Date    HX ORTHOPAEDIC      R grt toe surgery       No Known Allergies    Family History   Problem Relation Age of Onset    Heart Disease Mother     Dementia Mother     Diabetes Maternal Uncle     Alzheimer's Disease Maternal Grandmother     Diabetes Other     Cancer Neg Hx     Hypertension Neg Hx        Social History     Tobacco Use    Smoking status: Former     Packs/day: 0.00     Years: 2.00     Pack years: 0.00 Types: Cigars, Cigarettes     Quit date: 1997     Years since quittin.7    Smokeless tobacco: Never   Vaping Use    Vaping Use: Never used   Substance Use Topics    Alcohol use: Yes     Alcohol/week: 7.0 standard drinks     Types: 7 Glasses of wine per week     Comment: 1 glass of wine daily     Drug use: No       Current Outpatient Medications   Medication Sig Dispense Refill    memantine (NAMENDA) 10 mg tablet TAKE ONE TABLET BY MOUTH TWICE A DAY 60 Tablet 3    pantoprazole (PROTONIX) 40 mg tablet TAKE ONE TABLET BY MOUTH DAILY 90 Tablet 3    tamsulosin (Flomax) 0.4 mg capsule Take 1 Capsule by mouth daily. 90 Capsule 0    lisinopril-hydroCHLOROthiazide (PRINZIDE, ZESTORETIC) 10-12.5 mg per tablet Take 2 Tablets by mouth daily. 180 Tablet 1    rosuvastatin (CRESTOR) 10 mg tablet Take 1 Tablet by mouth nightly. 90 Tablet 3    Comp Stocking,Knee,Regular,Sml misc Use daily for leg swelling. Compression [id number] 2 Each 0    memantine (Namenda) 5 mg tablet 1 po every day x 7 then 1 po bid x 7 the 1 qam and 2 qpm x 7 then start 10mg bid  Titration Rx DO NOT REFILL 42 Tablet 0    sucralfate (CARAFATE) 1 gram tablet TAKE ONE TABLET BY MOUTH THREE TIMES A  Tablet 5    Blood Pressure Test Kit-Large ([employer] Arm BP Monitor) kit Check bp daily (Patient not taking: Reported on 10/25/2021) 1 Kit 0         Plan:  Obtain authorization for testing from [employer]. Report to follow once testing, scoring, and interpretation completed. ? Organic based neurocognitive issues versus mood disorder or combination of same. ? Problems organic, functional, or both? This note will not be viewable in [address]

## 2022-08-12 DIAGNOSIS — N40.1 BENIGN PROSTATIC HYPERPLASIA WITH URINARY FREQUENCY: ICD-10-CM

## 2022-08-12 DIAGNOSIS — R35.0 BENIGN PROSTATIC HYPERPLASIA WITH URINARY FREQUENCY: ICD-10-CM

## 2022-08-12 RX ORDER — TAMSULOSIN HYDROCHLORIDE 0.4 MG/1
0.4 CAPSULE ORAL DAILY
Qty: 90 CAPSULE | Refills: 0 | Status: SHIPPED | OUTPATIENT
Start: 2022-08-12

## 2022-08-12 NOTE — TELEPHONE ENCOUNTER
PCP: Violet Collins MD    Last appt: 5/17/2022  Future Appointments   Date Time Provider Amy Belcher   8/22/2022  9:45 AM Valdemar Oviedo MD [facility] BS AMB       Requested Prescriptions     Pending Prescriptions Disp Refills    tamsulosin (Flomax) 0.4 mg capsule 90 Capsule 0     Sig: Take 1 Capsule by mouth in the morning.        Prior labs and Blood pressures:  BP Readings from Last 3 Encounters:   05/17/22 (!) 109/54   03/10/22 117/72   12/14/21 109/70     Lab Results   Component Value Date/Time    Sodium 142 06/01/2022 01:30 PM    Potassium 5.1 06/01/2022 01:30 PM    Chloride 103 06/01/2022 01:30 PM    CO2 27 06/01/2022 01:30 PM    Anion gap 6 05/20/2021 09:33 AM    Glucose 106 (H) 06/01/2022 01:30 PM    BUN 12 06/01/2022 01:30 PM    Creatinine 0.78 06/01/2022 01:30 PM    BUN/Creatinine ratio 15 06/01/2022 01:30 PM    GFR est AA >60 05/20/2021 09:33 AM    GFR est non-AA >60 05/20/2021 09:33 AM    Calcium 10.1 06/01/2022 01:30 PM     Lab Results   Component Value Date/Time    Hemoglobin A1c 6.0 (H) 05/18/2022 08:55 AM    Hemoglobin A1c (POC) 5.7 06/16/2017 01:00 AM    Hemoglobin A1c, External 5.9 05/18/2015 12:00 AM     Lab Results   Component Value Date/Time    Cholesterol, total 133 05/18/2022 08:55 AM    HDL Cholesterol 61 05/18/2022 08:55 AM    LDL, calculated 60 05/18/2022 08:55 AM    LDL, calculated 131.4 (H) 05/20/2021 09:33 AM    VLDL, calculated 12 05/18/2022 08:55 AM    VLDL, calculated 37.6 05/20/2021 09:33 AM    Triglyceride 58 05/18/2022 08:55 AM    CHOL/HDL Ratio 4.8 05/20/2021 09:33 AM     No results found for: VANGIE Medrano    Lab Results   Component Value Date/Time    TSH 1.280 05/18/2022 08:55 AM    TSH 2.680 12/15/2014 09:38 AM

## 2022-08-22 ENCOUNTER — OFFICE VISIT (OUTPATIENT)
Dept: NEUROLOGY | Age: 77
End: 2022-08-22
Payer: MEDICARE

## 2022-08-22 VITALS
OXYGEN SATURATION: 96 % | DIASTOLIC BLOOD PRESSURE: 76 MMHG | RESPIRATION RATE: 16 BRPM | SYSTOLIC BLOOD PRESSURE: 124 MMHG | HEIGHT: 69 IN | BODY MASS INDEX: 28.14 KG/M2 | HEART RATE: 68 BPM | WEIGHT: 190 LBS

## 2022-08-22 DIAGNOSIS — R41.89 COGNITIVE DECLINE: Primary | ICD-10-CM

## 2022-08-22 PROCEDURE — G8752 SYS BP LESS 140: HCPCS | Performed by: PSYCHIATRY & NEUROLOGY

## 2022-08-22 PROCEDURE — G8754 DIAS BP LESS 90: HCPCS | Performed by: PSYCHIATRY & NEUROLOGY

## 2022-08-22 PROCEDURE — 99214 OFFICE O/P EST MOD 30 MIN: CPT | Performed by: PSYCHIATRY & NEUROLOGY

## 2022-08-22 PROCEDURE — 1101F PT FALLS ASSESS-DOCD LE1/YR: CPT | Performed by: PSYCHIATRY & NEUROLOGY

## 2022-08-22 PROCEDURE — 3017F COLORECTAL CA SCREEN DOC REV: CPT | Performed by: PSYCHIATRY & NEUROLOGY

## 2022-08-22 PROCEDURE — G8536 NO DOC ELDER MAL SCRN: HCPCS | Performed by: PSYCHIATRY & NEUROLOGY

## 2022-08-22 PROCEDURE — G8427 DOCREV CUR MEDS BY ELIG CLIN: HCPCS | Performed by: PSYCHIATRY & NEUROLOGY

## 2022-08-22 PROCEDURE — G8510 SCR DEP NEG, NO PLAN REQD: HCPCS | Performed by: PSYCHIATRY & NEUROLOGY

## 2022-08-22 PROCEDURE — G8417 CALC BMI ABV UP PARAM F/U: HCPCS | Performed by: PSYCHIATRY & NEUROLOGY

## 2022-08-22 PROCEDURE — 1123F ACP DISCUSS/DSCN MKR DOCD: CPT | Performed by: PSYCHIATRY & NEUROLOGY

## 2022-08-22 RX ORDER — FINASTERIDE 5 MG/1
5 TABLET, FILM COATED ORAL DAILY
COMMUNITY
Start: 2022-06-10

## 2022-08-22 NOTE — PROGRESS NOTES
[facility] and  [address] at [facility] at [address] [place] [address] [address] [place], [address] [place] [address] [address]   [phone]              Chief Complaint   Patient presents with    Memory Loss     Follow up - patient and wife states memory is about the same, it has gotten any better or any worse. Current Outpatient Medications   Medication Sig Dispense Refill    finasteride (PROSCAR) 5 mg tablet Take 5 mg by mouth daily. memantine (NAMENDA) 10 mg tablet TAKE ONE TABLET BY MOUTH TWICE A DAY 60 Tablet 3    pantoprazole (PROTONIX) 40 mg tablet TAKE ONE TABLET BY MOUTH DAILY 90 Tablet 3    lisinopril-hydroCHLOROthiazide (PRINZIDE, ZESTORETIC) 10-12.5 mg per tablet Take 2 Tablets by mouth daily. 180 Tablet 1    rosuvastatin (CRESTOR) 10 mg tablet Take 1 Tablet by mouth nightly. 90 Tablet 3    Comp Stocking,Knee,Regular,Sml misc Use daily for leg swelling. Compression [id number] 2 Each 0    sucralfate (CARAFATE) 1 gram tablet TAKE ONE TABLET BY MOUTH THREE TIMES A  Tablet 5    tamsulosin (Flomax) 0.4 mg capsule Take 1 Capsule by mouth in the morning. (Patient not taking: Reported on 2022) 90 Capsule 0    memantine (Namenda) 5 mg tablet 1 po every day x 7 then 1 po bid x 7 the 1 qam and 2 qpm x 7 then start 10mg bid  Titration Rx DO NOT REFILL (Patient not taking: Reported on 2022) 42 Tablet 0    Blood Pressure Test Kit-Large (SureLife Arm BP Monitor) kit Check bp daily (Patient not taking: No sig reported) 1 Kit 0      No Known Allergies  Social History     Tobacco Use    Smoking status: Former     Packs/day: 0.00     Years: 2.00     Pack years: 0.00     Types: Cigars, Cigarettes     Quit date: 1997     Years since quittin.7    Smokeless tobacco: Never   Vaping Use    Vaping Use: Never used   Substance Use Topics    Alcohol use:  Yes     Alcohol/week: 7.0 standard drinks     Types: 7 Glasses of wine per week     Comment: 1 glass of wine daily     Drug use: No   21-year-old gentleman who returns today with his wife for follow-up progressive cognitive decline. Last visit we instituted Namenda  He underwent several tests  MRI of the brain December 28, 2021 with atrophy of the bilateral hippocampi mesial temporal lobes  Carotid Doppler unremarkable  EEG was normal  Formal neuropsychological evaluation is to be scheduled    Tolerating [place]da    Examination  Visit Vitals  /76   Pulse 68   Resp 16   Ht 5' 9\" (1.753 m)   Wt 86.2 kg (190 lb)   SpO2 96%   BMI 28.06 kg/m²     Pleasant gentleman. Awake alert and oriented    Impression/Plan  Progressive cognitive decline  Maintain Percy  Discussed his test results as well as the atrophy particularly on MRI  Follow-up after neuropsychological eval and if he does indeed have dementia/Alzheimer's we will add Aricept    Maude Lees MD        This note was created using voice recognition software. Despite editing, there may be syntax errors.

## 2022-09-14 ENCOUNTER — DOCUMENTATION ONLY (OUTPATIENT)
Dept: NEUROLOGY | Age: 77
End: 2022-09-14

## 2022-09-14 ENCOUNTER — OFFICE VISIT (OUTPATIENT)
Dept: NEUROLOGY | Age: 77
End: 2022-09-14

## 2022-09-14 DIAGNOSIS — G30.9 ALZHEIMER'S DISEASE, UNSPECIFIED (CODE) (HCC): Primary | ICD-10-CM

## 2022-09-21 ENCOUNTER — OFFICE VISIT (OUTPATIENT)
Dept: NEUROLOGY | Age: 77
End: 2022-09-21
Payer: MEDICARE

## 2022-09-21 DIAGNOSIS — F41.0 PANIC: ICD-10-CM

## 2022-09-21 DIAGNOSIS — G30.1 LATE ONSET ALZHEIMER'S DEMENTIA WITH BEHAVIORAL DISTURBANCE (HCC): Primary | ICD-10-CM

## 2022-09-21 DIAGNOSIS — F22 PARANOIA (HCC): ICD-10-CM

## 2022-09-21 DIAGNOSIS — F02.818 LATE ONSET ALZHEIMER'S DEMENTIA WITH BEHAVIORAL DISTURBANCE (HCC): Primary | ICD-10-CM

## 2022-09-21 PROCEDURE — 96137 PSYCL/NRPSYC TST PHY/QHP EA: CPT | Performed by: CLINICAL NEUROPSYCHOLOGIST

## 2022-09-21 PROCEDURE — 96138 PSYCL/NRPSYC TECH 1ST: CPT | Performed by: CLINICAL NEUROPSYCHOLOGIST

## 2022-09-21 PROCEDURE — 96136 PSYCL/NRPSYC TST PHY/QHP 1ST: CPT | Performed by: CLINICAL NEUROPSYCHOLOGIST

## 2022-09-21 PROCEDURE — 96133 NRPSYC TST EVAL PHYS/QHP EA: CPT | Performed by: CLINICAL NEUROPSYCHOLOGIST

## 2022-09-21 PROCEDURE — 96139 PSYCL/NRPSYC TST TECH EA: CPT | Performed by: CLINICAL NEUROPSYCHOLOGIST

## 2022-09-21 PROCEDURE — 96132 NRPSYC TST EVAL PHYS/QHP 1ST: CPT | Performed by: CLINICAL NEUROPSYCHOLOGIST

## 2022-09-21 NOTE — LETTER
9/27/2022    Patient: Mike Oden   YOB: 1945   Date of Visit: 9/21/2022     Babar Burton MD  [address] [address]  Via In Basket    Dear Babar Burton MD,      Thank you for referring Mr. Lexy Ricci to [facility] for evaluation. My notes for this consultation are attached. If you have questions, please do not hesitate to call me. I look forward to following your patient along with you.       Sincerely,    Link Douglas PsyD

## 2022-09-27 NOTE — PROGRESS NOTES
[address] [facility],5Th Floor  [address]   [address] [place] [address] Formerly [facility], [facility]0 [facility], [facility]14 [address]   [phone] Office   [phone] Fax      Neuropsychological Evaluation Report    Referral:  Sharad Talamantes MD, .  Jose is a 78 y.o. right handed   male  who was accompanied by his spouse to the initial clinical interview on  8/10/22. Please refer to his medical records for details pertaining to his history. At the start of the appointment, I reviewed the patient's [facility] Epic Chart (including Media scanned in from previous providers) for the active Problem List, all pertinent Past Medical Hx, medications, recent radiologic and laboratory findings. In addition, I reviewed pt's documented Immunization Record and Encounter History. [facility]) without history of previously diagnosed LD and/or receipt of special education services. 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 He has no known background stroke, meningitis/encephalitis, TAI Fever, Lupus, Lyme, sz. He has had many head injuries. He has progressive memory decline. He has a Master's Degree as well. He first started noticing memory issues about 20 years ago. She says that memory issues have been going on for 20 years and it has been a gradual and it has been much faster now. Forgets conversations. Misplaces things. Starts tasks and does not complete. He is starting to argue with people in the parking lot and was in the neighborhood thinking that others were out to get him. He would panic to the point of shaking. This would last four to fiive minutes. He has not been driving. Wife says that he will get Dig[employer] cards and does not remember who the people are that sent them. He has been on medication for mood since he saw Dr. Rae Orozco. He was thinking spouse was having an affair. He has a hard time using a cell phone.    He also has Charcot-Lucero-Tooth. He has been seen at [address] [place] [address]. He has seen 7 or so neurologists. His mother and her mother had dementia, starting in their 45s and 46s, per patient. Spouse says it was more in their 62s. 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 Sold car 18 months ago due to memory problems. Doesn't always recall meds. Spouse does the finances. Does his ADLs. He puts the wrong password in all the time. He has a history of being on SSRI for panic? And as on prilosec also for ongoing heartburn and must wonder about somatic anxiety. Record review finds patient was seen by Dr. Azra Robertson in 2015 with EMG demonstrating Charcot-Lucero-Tooth and he was referred down to [facility] neuromuscular department. This examination demonstrated full strength except weakness in the intrinsic hand muscles 4 - and plantar flexors of the feet. He also had atrophy of the intrinsic hand muscles and muscles below the knees bilaterally. He had diminished sensation. Has been on Namenda also since they have seen Dr. Jayjay Park. EXAM: MRI BRAIN WO CONT     INDICATION: cognitive decline/confusion     COMPARISON: CT head 10/26/2013. CONTRAST: None. TECHNIQUE:    Multiplanar multisequence acquisition without contrast of the brain. FINDINGS:  The ventricles are normal in size and position. There is mild disproportionate  atrophy of the bilateral hippocampi and mesial temporal lobes (series 5 image 10  and series 6 image 10). The brain parenchyma otherwise has normal signal  characteristics for age. There is no acute infarct, hemorrhage, extra-axial  fluid collection, or mass effect. There is no cerebellar tonsillar herniation. Expected arterial flow-voids are present. Mild mucosal thickening in the bilateral ethmoidal air cells and right maxillary  sinus. The mastoid air cells and middle ears are clear. The orbital contents are  within normal limits.  No significant osseous or scalp lesions are identified. IMPRESSION     1. Mild disproportionate atrophy of the bilateral hippocampi and mesial temporal  lobes, which may suggest Alzheimer's dementia. Correlate clinically. 2. Otherwise unremarkable brain MRI.          Neuropsychological Mental Status Exam (NMSE):      Historian: fair for short term info  Praxis: No UE apraxia  R/L Orientation: Intact to self and to other  Dress: within normal limits   Weight: within normal limits   Appearance/Hygiene: within normal limits   Gait: slow, bilateral foot drop, with rollator  Assistive Devices: rollator  Mood: within normal limits   Affect: within normal limits   Comprehension: within normal limits   Thought Process: within normal limits   Expressive Language: within normal limits   Receptive Language: within normal limits   Motor:  No cognitive or motor perseveration  ETOH:  rarely, not to excess  Tobacco: Denied  Marijuana: Denied  Illicit: Denied  SI/HI:  Denied  Psychosis: Denied  Insight: Fair  Judgment: Within normal limits  Other Psych: Son has bipolar, daughter with psychotic breaks      Past Medical History:   Diagnosis Date    Arthritis     cervical spine    Calculus of kidney     Charcot-Lucero-Tooth disease     type 2    GERD (gastroesophageal reflux disease)     Hypercholesterolemia     Memory change     Peripheral sensory-motor axonal polyneuropathy     Skin cancer     SCC on chest    Sun-damaged skin     Sunburn, blistering     Tinnitus        Past Surgical History:   Procedure Laterality Date    HX ORTHOPAEDIC      R grt toe surgery       No Known Allergies    Family History   Problem Relation Age of Onset    Heart Disease Mother     Dementia Mother     Diabetes Maternal Uncle     Alzheimer's Disease Maternal Grandmother     Diabetes Other     Cancer Neg Hx     Hypertension Neg Hx        Social History     Tobacco Use    Smoking status: Former     Packs/day: 0.00     Years: 2.00     Pack years: 0.00     Types: Cigars, Cigarettes     Quit date: 1997     Years since quittin.8    Smokeless tobacco: Never   Vaping Use    Vaping Use: Never used   Substance Use Topics    Alcohol use: Yes     Alcohol/week: 7.0 standard drinks     Types: 7 Glasses of wine per week     Comment: 1 glass of wine daily     Drug use: No       Current Outpatient Medications   Medication Sig Dispense Refill    finasteride (PROSCAR) 5 mg tablet Take 5 mg by mouth daily. tamsulosin (Flomax) 0.4 mg capsule Take 1 Capsule by mouth in the morning. (Patient not taking: Reported on 2022) 90 Capsule 0    memantine (NAMENDA) 10 mg tablet TAKE ONE TABLET BY MOUTH TWICE A DAY 60 Tablet 3    pantoprazole (PROTONIX) 40 mg tablet TAKE ONE TABLET BY MOUTH DAILY 90 Tablet 3    lisinopril-hydroCHLOROthiazide (PRINZIDE, ZESTORETIC) 10-12.5 mg per tablet Take 2 Tablets by mouth daily. 180 Tablet 1    rosuvastatin (CRESTOR) 10 mg tablet Take 1 Tablet by mouth nightly. 90 Tablet 3    Comp Stocking,Knee,Regular,Sml misc Use daily for leg swelling. Compression [id number] 2 Each 0    memantine (Namenda) 5 mg tablet 1 po every day x 7 then 1 po bid x 7 the 1 qam and 2 qpm x 7 then start 10mg bid  Titration Rx DO NOT REFILL (Patient not taking: Reported on 2022) 42 Tablet 0    sucralfate (CARAFATE) 1 gram tablet TAKE ONE TABLET BY MOUTH THREE TIMES A  Tablet 5    Blood Pressure Test Kit-Large (SureLife Arm BP Monitor) kit Check bp daily (Patient not taking: No sig reported) 1 Kit 0         Plan:  Obtain authorization for testing from [employer]. Report to follow once testing, scoring, and interpretation completed. ? Organic based neurocognitive issues versus mood disorder or combination of same. ? Problems organic, functional, or both? This note will not be viewable in [address]         Neuropsychological Test Results  Patient Testing 22 and 22 Report Completed 22  A Psychometrist Assisted w/ portions of this evaluation while under my direct  supervision    The following evaluation procedures/tests were administered:      Neuropsychologist Administered/Interpreted:  Neuropsychological Mental Status Exam, Revised Memory & Behavior Checklist,  Mini Mental Status Exam, Clock Drawing Test, Test Of Premorbid Functioning, Dieter-Melzack Pain Questionnaire, History Taking  & Clinical Interview With The Patient, Additional History Taking w/ The Patient's Spouse, ABAS-3, CASE, LUCIE, CPT-III, Review Of Available Records. Psychometrist Administered under Neuropsychologist Supervision & Neuropsychologist Interpreted:  Verbal Fluency Tests, [facility] - Revised, Trailmaking Test Parts A & B, Wechsler Adult Intelligence Scale - IV, One On One Ads Learning Test - 3, Grooved Pegboard, Nath Depression Inventory - II, Nath Anxiety Inventory. Test Findings:  Test Findings:  Note:  The patients raw data have been compared with currently available norms which include demographic corrections for age, gender, and/or education. Sometimes, the patients scores are compared to demographically similar individuals as close to the patients age, education level, etc., as possible. \"Average\" is viewed as being +/- 1 standard deviation (SD) from the stated mean for a particular test score. \"Low average\" is viewed as being between 1 and 2 SD below the mean, and above average is viewed as being 1 and 2 SD above the mean. Scores falling in the borderline range (between 1-1/2 and 2 SD below the mean) are viewed with particular attention as to whether they are normal or abnormal neurocognitive test scores. Other methods of inference in analyzing the test data are also utilized, including the pattern and range of scores in the profile, bilateral motor functions, and the presence, if any, of pathognomonic signs. Behaviorally, the patient was friendly and cooperative and appeared motivated to perform well during this examination.   Within this context, the results of this evaluation are viewed as a valid reflection of the patients actual neurocognitive and emotional status. His MMSE score of 25/30 correct was impaired. In this regard, he was not oriented to date. Backwards spelling was 4/5 correct. Recall 0/3. Clock drawing was normal.        His structured word list fluency, as assessed by the FAS Test, was within the below average range with a T score of 40. Category fluency was within the moderately impaired range with a T score of 25. Confrontation naming ability, as assessed by the [facility] Test - Revised, was within the moderately impaired range (T = 26). This pattern of performance is indicative of a patient who is at increased risk for day-to-day problems with verbal fluency and confrontation naming. The patient was administered the [facility] Continuous Performance Test - III,a computer administered test of sustained attention, and review of the subscales within this instrument revealed moderate to severe concerns for inattentiveness without impulsivity. This pattern of performance is indicative of a patient who is at increased risk for day-to-day problems with sustained visual attention/concentration. The patient is not showing problems with working memory capacity (37th %ile) or processing speed (14th %ile) on the WAIS-IV. His Verbal Comprehension Index score of 87 was low average. His Perceptual Reasoning Index score of 92 was average. These scores do not reflect a major decline in functioning based on an assessment of premorbid functioning. The patient was administered the [place] Verbal Learning Test  - 3 and generated an impaired range (and flat) learning curve over five repeated auditory word list learning trials. An interference trial was within the normal range. Free and cued, short and long delayed recall were all impaired. Recognition recall was impaired, as was his forced choice recall.   No concern for malingering, however. This pattern of performance is indicative of a patient who is at increased risk for day-to-day problems with auditory learning and memory. Simple timed visual motor sequencing (Trailmaking Test Part A) was within the below average range with a T score of 43. His performance on a similar, but more complex task of timed visual motor sequencing (Trailmaking Test Part B) was within the mildly impaired range with a T score of 37. He made two sequencing errors on this latter completed test.  Taken together, this pattern of performance is not indicative of a patient who is at increased risk for day-to-day problems with executive functioning. Fine motor dexterity was within the normal range bilaterally. This pattern of performance is not  indicative of a patient who is at increased risk for day-to-day problems with bilateral motor dexterity. The patient rated his current level of pain as \"0/5- No Pain\" on the Dieter-Melzack Pain Questionnaire. His Nath Depression Inventory- II score of 8 was within the normal range. His Nath Anxiety Inventory score of 3 reflected minimal anxiety. The patient was administered the Personality Assessment Inventory review of the validity scales revealed a high level of defensiveness in responding. The spouse completed the ABAS-3 and reported borderline range concerns regarding the patient's conceptual skills (SS = 78) and social skills (SS = 75). On the CASE, the spouse reported concerns for anxiety, cognitive functioning, paranoia, and psychosis. Impressions & Recommendations: This patient generated an abnormal range Neuropsychological Evaluation with respect to neurocognitive functioning. In this regard, the generated profile is consistent with an individual who has moderate dementia with behavioral changes. He himself denied clinically significant psychopathology, though the spouse reports paranoia, anxiety/panic. In my opinion, this is likely Alzheimer's. In addition to continued medical care, my recommendations include consideration for memory management medication. Continue addressing his psychiatric concerns, including paranoia and anxiety/panic. The patient should be encouraged to remain as mentally, socially, and physically active as possible. The patient's generated cognitive difficulties are significant to the degree whereby it is my opinion that he should not live independently without appropriate supervision for those domains with a heavy memory emphasis. This includes medication management supervision and supervision of financial dealings. Marked problems with attention and reaction time raise concern regarding driving safety, and I suggest consideration for a formal evaluation of same. He retains capacity at this time. Baseline now established. Follow up yearly, or prn. Clinical correlation is, of course, indicated. I will discuss these findings with the patient and family when they follow up with me in the near future. A follow up Neuropsychological Evaluation is indicated on a prn basis. DIAGNOSES: Dementia With Behavior Changes     The above information is based upon information currently available to me. If there is any additional information of which I am currently unaware, I would be more than happy to review it upon having it made available to me. Thank you for the opportunity to see this interesting individual.     Sincerely,       Roxi Coulter. Kwaku Sanchez PsyD, EdS      Attachments:  IQ Test Results (In Media Section Of This EMR)    Cc: Moise Anderson MD    Time Documentation:    10758*6 35931*1 (60 minutes)    96138 x 1  96139 x 5 Test Administration/Data Gathering By Technician: (3 hours).  20622 x 1 (first 30 minutes), 19720 x 5 (each additional 30 minutes)    96132 x 1  96133 x 1 Testing Evaluation Services by Neuropsychologist (1 hour, 50 minutes) 96132 x 1 (first hour), 81699 x 1 (50 minutes)    Definitions:      79454/97685:  Neurobehavioral Status Exam, Clinical interview. Clinical assessment of thinking, reasoning and judgment, by neuropsychologist, both face to face time with patient and time interpreting those test results and reporting, first and subsequent hours)    49913/07074: Neuropsychological Test Administration by Technician/Psychometrist, first 30 minutes and each additional 30 minutes. The above includes: Record review. Review of history provided by patient. Review of collaborative information. Testing by Clinician. Review of raw data. Scoring. Report writing of individual tests administered by Clinician. Integration of individual tests administered by psychometrist with NSE/testing by clinician, review of records/history/collaborative information, case Conceptualization, treatment planning, clinical decision making, report writing, coordination Of Care. Psychometry test codes as time spent by psychometrist administering and scoring neurocognitive/psychological tests under supervision of neuropsychologist.  Integral services including scoring of raw data, data interpretation, case conceptualization, report writing etcetera were initiated after the patient finished testing/raw data collected and was completed on the date the report was signed. Please note that this dictation was completed with [employer] the [employer] voice recognition software. Quite often unanticipated grammatical, syntax, homophones, and other interpretive errors are inadvertently transcribed by the computer software. Please disregard these errors. Please excuse any errors that have escaped final proofreading. Thank you.

## 2022-11-02 RX ORDER — MEMANTINE HYDROCHLORIDE 10 MG/1
TABLET ORAL
Qty: 60 TABLET | Refills: 3 | Status: SHIPPED | OUTPATIENT
Start: 2022-11-02

## 2022-11-09 ENCOUNTER — OFFICE VISIT (OUTPATIENT)
Dept: NEUROLOGY | Age: 77
End: 2022-11-09
Payer: MEDICARE

## 2022-11-09 DIAGNOSIS — F41.0 ANXIETY DISORDER DUE TO GENERAL MEDICAL CONDITION WITH PANIC ATTACK: ICD-10-CM

## 2022-11-09 DIAGNOSIS — G30.1 LATE ONSET ALZHEIMER'S DEMENTIA WITH BEHAVIORAL DISTURBANCE (HCC): Primary | ICD-10-CM

## 2022-11-09 DIAGNOSIS — F41.0 PANIC: ICD-10-CM

## 2022-11-09 DIAGNOSIS — I10 ESSENTIAL HYPERTENSION: ICD-10-CM

## 2022-11-09 DIAGNOSIS — F02.818 LATE ONSET ALZHEIMER'S DEMENTIA WITH BEHAVIORAL DISTURBANCE (HCC): Primary | ICD-10-CM

## 2022-11-09 DIAGNOSIS — F06.4 ANXIETY DISORDER DUE TO GENERAL MEDICAL CONDITION WITH PANIC ATTACK: ICD-10-CM

## 2022-11-09 DIAGNOSIS — F22 PARANOIA (HCC): ICD-10-CM

## 2022-11-09 PROCEDURE — 1123F ACP DISCUSS/DSCN MKR DOCD: CPT | Performed by: CLINICAL NEUROPSYCHOLOGIST

## 2022-11-09 PROCEDURE — 90847 FAMILY PSYTX W/PT 50 MIN: CPT | Performed by: CLINICAL NEUROPSYCHOLOGIST

## 2022-11-09 RX ORDER — LISINOPRIL AND HYDROCHLOROTHIAZIDE 10; 12.5 MG/1; MG/1
TABLET ORAL
Qty: 180 TABLET | Refills: 1 | Status: SHIPPED | OUTPATIENT
Start: 2022-11-09

## 2022-11-09 NOTE — PROGRESS NOTES
Prior to seeing the patient I reviewed the records, including the previously completed report, the records in [place], and any updated visits from other providers since I saw the patient last.      Today, I engaged in a psychoeducational and supportive and cognitive/behavioral psychotherapy session with the patient. I provided psychotherapy in the form of psychoeducation and support with respect to the results of the recent Neuropsychological Evaluation, including discussing individual tests as well as patient's areas of neurocognitive strength versus weakness. We discussed, in detail, the following: This patient generated an abnormal range Neuropsychological Evaluation with respect to neurocognitive functioning. In this regard, the generated profile is consistent with an individual who has moderate dementia with behavioral changes. He himself denied clinically significant psychopathology, though the spouse reports paranoia, anxiety/panic. In my opinion, this is likely Alzheimer's. In addition to continued medical care, my recommendations include consideration for memory management medication. Continue addressing his psychiatric concerns, including paranoia and anxiety/panic. The patient should be encouraged to remain as mentally, socially, and physically active as possible. The patient's generated cognitive difficulties are significant to the degree whereby it is my opinion that he should not live independently without appropriate supervision for those domains with a heavy memory emphasis. This includes medication management supervision and supervision of financial dealings. Marked problems with attention and reaction time raise concern regarding driving safety, and I suggest consideration for a formal evaluation of same. He retains capacity at this time. Baseline now established. Follow up yearly, or prn. Clinical correlation is, of course, indicated. I will discuss these findings with the patient and family when they follow up with me in the near future. A follow up Neuropsychological Evaluation is indicated on a prn basis. DIAGNOSES: Dementia With Behavior Changes    Education was provided regarding my diagnostic impressions, and we discussed treatment plan/options. I also answered numerous questions related to the clinical findings, including discussing various methods to improve cognition and mood. Counseling provided regarding mood and cognition. CBT and supportive psychotherapy techniques were utilized. Supportive/Cognitive Behavioral/Solution Focused psychotherapy provided  Discussed rational versus irrational thinking patterns and their consequences. Discussed healthy/adaptive and unhealthy/maladaptive coping. The patient needs to follow with Dr. Carlito Livingston. He is worsening significantly. Very anxious. He thinks I implanted something in his brain. He thinks he is under house arrest.  Getting much worse. Progressing fast.   He needs to follow with Dr. Carlito Livingston. Will set that up today. She is POA. The patient had the following concerns which I deferred to their referring provider: meds for mood/cognition  Patient has no questions and no insight and anxious.     Lengthy conversation with spouse      Time spent today:30

## 2022-11-10 ENCOUNTER — OFFICE VISIT (OUTPATIENT)
Dept: NEUROLOGY | Age: 77
End: 2022-11-10
Payer: MEDICARE

## 2022-11-10 VITALS
SYSTOLIC BLOOD PRESSURE: 123 MMHG | RESPIRATION RATE: 14 BRPM | OXYGEN SATURATION: 97 % | DIASTOLIC BLOOD PRESSURE: 72 MMHG | HEART RATE: 67 BPM

## 2022-11-10 DIAGNOSIS — G30.1 LATE ONSET ALZHEIMER'S DEMENTIA WITH BEHAVIORAL DISTURBANCE (HCC): Primary | ICD-10-CM

## 2022-11-10 DIAGNOSIS — F22 PARANOIA (HCC): ICD-10-CM

## 2022-11-10 DIAGNOSIS — F02.818 LATE ONSET ALZHEIMER'S DEMENTIA WITH BEHAVIORAL DISTURBANCE (HCC): Primary | ICD-10-CM

## 2022-11-10 DIAGNOSIS — F41.9 ANXIETY: ICD-10-CM

## 2022-11-10 PROCEDURE — G8432 DEP SCR NOT DOC, RNG: HCPCS | Performed by: PSYCHIATRY & NEUROLOGY

## 2022-11-10 PROCEDURE — 3017F COLORECTAL CA SCREEN DOC REV: CPT | Performed by: PSYCHIATRY & NEUROLOGY

## 2022-11-10 PROCEDURE — G8536 NO DOC ELDER MAL SCRN: HCPCS | Performed by: PSYCHIATRY & NEUROLOGY

## 2022-11-10 PROCEDURE — 99214 OFFICE O/P EST MOD 30 MIN: CPT | Performed by: PSYCHIATRY & NEUROLOGY

## 2022-11-10 PROCEDURE — 1123F ACP DISCUSS/DSCN MKR DOCD: CPT | Performed by: PSYCHIATRY & NEUROLOGY

## 2022-11-10 PROCEDURE — 1101F PT FALLS ASSESS-DOCD LE1/YR: CPT | Performed by: PSYCHIATRY & NEUROLOGY

## 2022-11-10 PROCEDURE — G8752 SYS BP LESS 140: HCPCS | Performed by: PSYCHIATRY & NEUROLOGY

## 2022-11-10 PROCEDURE — G8427 DOCREV CUR MEDS BY ELIG CLIN: HCPCS | Performed by: PSYCHIATRY & NEUROLOGY

## 2022-11-10 PROCEDURE — G8417 CALC BMI ABV UP PARAM F/U: HCPCS | Performed by: PSYCHIATRY & NEUROLOGY

## 2022-11-10 PROCEDURE — G8754 DIAS BP LESS 90: HCPCS | Performed by: PSYCHIATRY & NEUROLOGY

## 2022-11-10 RX ORDER — DONEPEZIL HYDROCHLORIDE 10 MG/1
10 TABLET, FILM COATED ORAL DAILY
Qty: 90 TABLET | Refills: 3 | Status: SHIPPED | OUTPATIENT
Start: 2022-11-10

## 2022-11-10 RX ORDER — DONEPEZIL HYDROCHLORIDE 5 MG/1
5 TABLET, FILM COATED ORAL DAILY
Qty: 30 TABLET | Refills: 0 | Status: SHIPPED | OUTPATIENT
Start: 2022-11-10 | End: 2022-12-10

## 2022-11-10 NOTE — PROGRESS NOTES
[facility] and  [address] at [facility] at [facility] [address], [address] [facility] [address] [facility], [address] [place] [address]   [phone]              Chief Complaint   Patient presents with    Results     Neurocog testing. Wife states he is having difficult morning,      Current Outpatient Medications   Medication Sig Dispense Refill    lisinopril-hydroCHLOROthiazide (PRINZIDE, ZESTORETIC) 10-12.5 mg per tablet TAKE TWO TABLETS BY MOUTH DAILY 180 Tablet 1    memantine (NAMENDA) 10 mg tablet TAKE ONE TABLET BY MOUTH TWICE A DAY 60 Tablet 3    pantoprazole (PROTONIX) 40 mg tablet TAKE ONE TABLET BY MOUTH DAILY 90 Tablet 3    rosuvastatin (CRESTOR) 10 mg tablet Take 1 Tablet by mouth nightly. 90 Tablet 3    Comp Stocking,Knee,Regular,Sml misc Use daily for leg swelling. Compression [id number] 2 Each 0    sucralfate (CARAFATE) 1 gram tablet TAKE ONE TABLET BY MOUTH THREE TIMES A  Tablet 5    Blood Pressure Test Kit-Large (SureLife Arm BP Monitor) kit Check bp daily (Patient not taking: No sig reported) 1 Kit 0      No Known Allergies  Social History     Tobacco Use    Smoking status: Former     Packs/day: 0.00     Years: 2.00     Pack years: 0.00     Types: Cigars, Cigarettes     Quit date: 1997     Years since quittin.0    Smokeless tobacco: Never   Vaping Use    Vaping Use: Never used   Substance Use Topics    Alcohol use: Yes     Alcohol/week: 7.0 standard drinks     Types: 7 Glasses of wine per week     Comment: 1 glass of wine daily     Drug use: No     79-year-old man presents today for follow-up accompanied by his wife. He is a gentleman I was seen for progressive cognitive decline question dementia. He had an MRI scan that demonstrated atrophy of the bilateral hippocampi mesial temporal lobes.   Remainder of work-up including EEG and Doppler were normal.  He had his formal neurocognitive evaluation with Dr. Fidelina Pressley and they had a feedback session with Dr. Fidelina Pressley yesterday. Neuropsychological evaluation demonstrated an abnormal evaluation and he was said to have a profile consistent with moderate dementia with behavioral changes and this was most likely an Alzheimer's type process. Is noted to have significant paranoia. He was felt to have capacity but needed supervision. He tells me today that he does not remember having an appointment with Dr. Fidelina Pressley yesterday and therefore he cannot tell me what was said. His wife notes that he is having increasing paranoia. He will not leave the house because he thinks the police will shoot him. She notes that she has children with bipolar and she believes that he may be psychotic etc.  He has anxiety. Examination  Visit Vitals  /72 (BP 1 Location: Left upper arm, BP Patient Position: Sitting, BP Cuff Size: Adult)   Pulse 67   Resp 14   SpO2 97%   He is awake and alert. No ataxia. Uses a Rollator. Impression/Plan  Alzheimer's type dementia with worsening paranoia and anxiety  Continue Namenda  Add Aricept  Refer to psychiatry for treatment of paranoia and anxiety  Follow-up in about 3-4 months    Nadeen Roque MD        This note was created using voice recognition software. Despite editing, there may be syntax errors.

## 2022-12-14 ENCOUNTER — OFFICE VISIT (OUTPATIENT)
Dept: FAMILY MEDICINE CLINIC | Age: 77
End: 2022-12-14
Payer: MEDICARE

## 2022-12-14 VITALS
DIASTOLIC BLOOD PRESSURE: 61 MMHG | RESPIRATION RATE: 16 BRPM | OXYGEN SATURATION: 99 % | HEART RATE: 53 BPM | SYSTOLIC BLOOD PRESSURE: 92 MMHG | TEMPERATURE: 96.8 F | BODY MASS INDEX: 26.08 KG/M2 | WEIGHT: 176.6 LBS

## 2022-12-14 DIAGNOSIS — K21.00 GASTROESOPHAGEAL REFLUX DISEASE WITH ESOPHAGITIS WITHOUT HEMORRHAGE: ICD-10-CM

## 2022-12-14 DIAGNOSIS — Z00.00 MEDICARE ANNUAL WELLNESS VISIT, SUBSEQUENT: Primary | ICD-10-CM

## 2022-12-14 RX ORDER — SUCRALFATE 1 G/1
1 TABLET ORAL 3 TIMES DAILY
Qty: 270 TABLET | Refills: 5 | Status: SHIPPED | OUTPATIENT
Start: 2022-12-14

## 2022-12-14 NOTE — PROGRESS NOTES
This is the Subsequent Medicare Annual Wellness Exam, performed 12 months or more after the Initial AWV or the last Subsequent AWV    I have reviewed the patient's medical history in detail and updated the computerized patient record. Assessment/Plan   Education and counseling provided:  Are appropriate based on today's review and evaluation    1. Medicare annual wellness visit, subsequent  2. Gastroesophageal reflux disease with esophagitis without hemorrhage  -     sucralfate (CARAFATE) 1 gram tablet; Take 1 Tablet by mouth three (3) times daily. , Normal, Disp-270 Tablet, R-5  Patient declines lab work today, declines immunizations today, declines colon cancer screening today, declines ENT referral today. Depression Risk Factor Screening     3 most recent PHQ Screens 12/14/2022   Little interest or pleasure in doing things Not at all   Feeling down, depressed, irritable, or hopeless Not at all   Total Score PHQ 2 0   Trouble falling or staying asleep, or sleeping too much -   Feeling tired or having little energy -   Poor appetite, weight loss, or overeating -   Feeling bad about yourself - or that you are a failure or have let yourself or your family down -   Trouble concentrating on things such as school, work, reading, or watching TV -   Moving or speaking so slowly that other people could have noticed; or the opposite being so fidgety that others notice -   Thoughts of being better off dead, or hurting yourself in some way -   PHQ 9 Score -       Alcohol & Drug Abuse Risk Screen    Do you average more than 1 drink per night or more than 7 drinks a week: No    In the past three months have you have had more than 4 drinks containing alcohol on one occasion: No          Functional Ability and Level of Safety    Hearing: The patient needs further evaluation. Activities of Daily Living:   The home contains: handrails and grab bars  Patient needs help with:  transportation, shopping, preparing meals, laundry, housework, managing medications, managing money, bathing, and bathroom needs      Ambulation: with difficulty, uses a walker     Fall Risk:  Fall Risk Assessment, last 12 mths 12/14/2022   Able to walk? Yes   Fall in past 12 months? 0   Do you feel unsteady? 0   Are you worried about falling 0   Number of falls in past 12 months -   Fall with injury? -      Abuse Screen:  Patient is not abused       Cognitive Screening    Has your family/caregiver stated any concerns about your memory: yes - h has dementia     Cognitive Screening: Diagnosed with dementia    Review of Systems   Constitutional: Negative. HENT: Negative. Eyes: Negative. Respiratory: Negative. Cardiovascular: Negative. Gastrointestinal: Negative. Genitourinary: Negative. Musculoskeletal: Negative. Skin: Negative. Neurological: Negative. Endo/Heme/Allergies: Negative. Psychiatric/Behavioral:  Positive for hallucinations and memory loss. Physical Exam  Constitutional:       Appearance: Normal appearance. HENT:      Right Ear: Tympanic membrane, ear canal and external ear normal.      Left Ear: Tympanic membrane, ear canal and external ear normal.      Mouth/Throat:      Pharynx: No oropharyngeal exudate. Eyes:      Extraocular Movements: Extraocular movements intact. Conjunctiva/sclera: Conjunctivae normal.      Pupils: Pupils are equal, round, and reactive to light. Neck:      Thyroid: No thyromegaly. Cardiovascular:      Rate and Rhythm: Normal rate and regular rhythm. Pulmonary:      Effort: Pulmonary effort is normal.      Breath sounds: Normal breath sounds. Abdominal:      General: Bowel sounds are normal. There is no distension. Palpations: Abdomen is soft. Tenderness: There is no abdominal tenderness. Musculoskeletal:         General: Normal range of motion. Cervical back: Normal range of motion and neck supple. Right lower leg: No edema.       Left lower leg: No edema. Lymphadenopathy:      Cervical: No cervical adenopathy. Skin:     General: Skin is warm and dry. Neurological:      General: No focal deficit present. Mental Status: He is alert and oriented to person, place, and time. Mental status is at baseline. Psychiatric:         Mood and Affect: Mood normal.         Behavior: Behavior normal.      Health Maintenance Due     Health Maintenance Due   Topic Date Due    DTaP/Tdap/Td series (1 - Tdap) Never done    Shingrix Vaccine Age 50> (1 of 2) Never done    COVID-19 Vaccine (4 - Booster for Pfizer series) 11/21/2021    Flu Vaccine (1) 08/01/2022       Patient Care Team   Patient Care Team:  Ashlee Jones MD as PCP - General (Family Medicine)  Ashlee Jones MD as PCP - [facility] Provider  Ania Solo MD (Gastroenterology)    History     Patient Active Problem List   Diagnosis Code    GERD (gastroesophageal reflux disease) K21.9    Kidney stones N20.0    Squamous cell skin cancer C44.92    Prediabetes R73.03    Fracture, cervical vertebra ([facility] [address] S12. 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 3    Borderline high serum cholesterol E78.9    Alzheimer's disease, unspecified G30.9     Past Medical History:   Diagnosis Date    Arthritis     cervical spine    Calculus of kidney     Charcot-Lucero-Tooth disease     type 2    GERD (gastroesophageal reflux disease)     Hypercholesterolemia     Memory change     Peripheral sensory-motor axonal polyneuropathy     Skin cancer     SCC on chest    Sun-damaged skin     Sunburn, blistering     Tinnitus       Past Surgical History:   Procedure Laterality Date    HX ORTHOPAEDIC      R grt toe surgery Current Outpatient Medications   Medication Sig Dispense Refill    sucralfate (CARAFATE) 1 gram tablet Take 1 Tablet by mouth three (3) times daily. 270 Tablet 5    donepeziL (ARICEPT) 5 mg tablet TAKE ONE TABLET BY MOUTH DAILY 30 Tablet 5    lisinopril-hydroCHLOROthiazide (PRINZIDE, ZESTORETIC) 10-12.5 mg per tablet TAKE TWO TABLETS BY MOUTH DAILY 180 Tablet 1    memantine (NAMENDA) 10 mg tablet TAKE ONE TABLET BY MOUTH TWICE A DAY 60 Tablet 3    pantoprazole (PROTONIX) 40 mg tablet TAKE ONE TABLET BY MOUTH DAILY 90 Tablet 3    rosuvastatin (CRESTOR) 10 mg tablet Take 1 Tablet by mouth nightly. 90 Tablet 3    donepeziL (Aricept) 10 mg tablet Take 1 Tablet by mouth daily. (Patient not taking: Reported on 2022) 90 Tablet 3    Comp Stocking,Knee,Regular,Sml misc Use daily for leg swelling. Compression [id number] (Patient not taking: Reported on 2022) 2 Each 0    Blood Pressure Test Kit-Large ([employer] Arm BP Monitor) kit Check bp daily (Patient not taking: No sig reported) 1 Kit 0     No Known Allergies    Family History   Problem Relation Age of Onset    Heart Disease Mother     Dementia Mother     Diabetes Maternal Uncle     Alzheimer's Disease Maternal Grandmother     Diabetes Other     Cancer Neg Hx     Hypertension Neg Hx      Social History     Tobacco Use    Smoking status: Former     Packs/day: 0.00     Years: 2.00     Pack years: 0.00     Types: Cigars, Cigarettes     Quit date: 1997     Years since quittin.1    Smokeless tobacco: Never   Substance Use Topics    Alcohol use:  Yes     Alcohol/week: 7.0 standard drinks     Types: 7 Glasses of wine per week     Comment: 1 glass of wine daily          Cammie Carey MD

## 2022-12-14 NOTE — PATIENT INSTRUCTIONS
Medicare Wellness Visit, Male    The best way to live healthy is to have a lifestyle where you eat a well-balanced diet, exercise regularly, limit alcohol use, and quit all forms of tobacco/nicotine, if applicable. Regular preventive services are another way to keep healthy. Preventive services (vaccines, screening tests, monitoring & exams) can help personalize your care plan, which helps you manage your own care. Screening tests can find health problems at the earliest stages, when they are easiest to treat. [facility] follows the current, evidence-based guidelines published by the [facility] ([facility]) when recommending preventive services for our patients. Because we follow these guidelines, sometimes recommendations change over time as research supports it. (For example, a prostate screening blood test is no longer routinely recommended for men with no symptoms). Of course, you and your doctor may decide to screen more often for some diseases, based on your risk and co-morbidities (chronic disease you are already diagnosed with). Preventive services for you include:  - Medicare offers their members a free annual wellness visit, which is time for you and your primary care provider to discuss and plan for your preventive service needs.  Take advantage of this benefit every year!    -Over the age of 72 should receive the recommended pneumonia vaccines.   -All adults should have a flu vaccine yearly.  -All adults should receive a tetanus vaccine every 10 years.  -Over the age of 48 should receive the shingles vaccines.    -All adults should be screened once for Hepatitis C.  -All adults age 38-68 who are overweight should have a diabetes screening test once every three years.   -Other screening tests & preventive services for persons with diabetes include: an eye exam to screen for diabetic retinopathy, a kidney function test, a foot exam, and stricter control over your cholesterol.   -Cardiovascular screening for adults with routine risk involves an electrocardiogram (ECG) at intervals determined by the provider.     -Colorectal cancer screening should be done for adults age 43-69 with no increased risk factors for colorectal cancer. There are a number of acceptable methods of screening for this type of cancer. Each test has its own benefits and drawbacks. Discuss with your provider what is most appropriate for you during your annual wellness visit. The different tests include: colonoscopy (considered the best screening method), a fecal occult blood test, a fecal DNA test, and sigmoidoscopy.    -Lung cancer screening is recommended annually with a low dose CT scan for adults between age 54 and 68, who have smoked at least 30 pack years (equivalent of 1 pack per day for 30 days), and who is a current smoker or quit less than 15 years ago. -An Abdominal Aortic Aneurysm (AAA) Screening is recommended for men age 73-68 who has ever smoked in their lifetime.      Here is a list of your current Health Maintenance items (your personalized list of preventive services) with a due date:  Health Maintenance Due   Topic Date Due    DTaP/Tdap/Td  (1 - Tdap) Never done    Shingles Vaccine (1 of 2) Never done    COVID-19 Vaccine (4 - Booster for [employer] series) 11/21/2021    Yearly Flu Vaccine (1) 08/01/2022

## 2022-12-14 NOTE — PROGRESS NOTES
1. Have you been to the ER, urgent care clinic since your last visit? Hospitalized since your last visit? No    2. Have you seen or consulted any other health care providers outside of the [address] [place] [address] since your last visit? Include any pap smears or colon screening.  No          Chief Complaint   Patient presents with    Annual Wellness Visit    Medication Refill

## 2023-02-27 RX ORDER — MEMANTINE HYDROCHLORIDE 10 MG/1
TABLET ORAL
Qty: 180 TABLET | Refills: 3 | Status: SHIPPED | OUTPATIENT
Start: 2023-02-27 | End: 2023-03-02 | Stop reason: SDUPTHER

## 2023-03-02 ENCOUNTER — OFFICE VISIT (OUTPATIENT)
Dept: NEUROLOGY | Age: 78
End: 2023-03-02

## 2023-03-02 VITALS
SYSTOLIC BLOOD PRESSURE: 120 MMHG | OXYGEN SATURATION: 96 % | HEART RATE: 55 BPM | RESPIRATION RATE: 14 BRPM | DIASTOLIC BLOOD PRESSURE: 62 MMHG

## 2023-03-02 DIAGNOSIS — F02.818 LATE ONSET ALZHEIMER'S DEMENTIA WITH BEHAVIORAL DISTURBANCE (HCC): Primary | ICD-10-CM

## 2023-03-02 DIAGNOSIS — G30.1 LATE ONSET ALZHEIMER'S DEMENTIA WITH BEHAVIORAL DISTURBANCE (HCC): Primary | ICD-10-CM

## 2023-03-02 RX ORDER — DONEPEZIL HYDROCHLORIDE 10 MG/1
10 TABLET, FILM COATED ORAL DAILY
Qty: 90 TABLET | Refills: 3 | Status: SHIPPED | OUTPATIENT
Start: 2023-03-02

## 2023-03-02 RX ORDER — MEMANTINE HYDROCHLORIDE 10 MG/1
10 TABLET ORAL 2 TIMES DAILY
Qty: 180 TABLET | Refills: 3 | Status: SHIPPED | OUTPATIENT
Start: 2023-03-02

## 2023-06-19 RX ORDER — PANTOPRAZOLE SODIUM 40 MG/1
TABLET, DELAYED RELEASE ORAL
Qty: 90 TABLET | Refills: 1 | Status: SHIPPED | OUTPATIENT
Start: 2023-06-19

## 2023-06-19 NOTE — TELEPHONE ENCOUNTER
PCP: Juany Allen MD    Last appt:  12/14/22        Future Appointments   Date Time Provider Jolly Seymour   6/23/2023  2:20 PM Juany Allen MD [facility] BS AMB   9/6/2023  2:45 PM Jean Carlos Lewis MD [facility] BS AMB       Requested Prescriptions     Pending Prescriptions Disp Refills    pantoprazole (PROTONIX) 40 MG tablet [Pharmacy Med Name: PANTOPRAZOLE SOD DR 40 MG TAB] 90 tablet      Sig: TAKE ONE TABLET BY MOUTH DAILY       Prior labs and Blood pressures:  BP Readings from Last 3 Encounters:   03/02/23 120/62   12/14/22 92/61   11/10/22 123/72     Lab Results   Component Value Date/Time     06/01/2022 01:30 PM    K 5.1 06/01/2022 01:30 PM     06/01/2022 01:30 PM    CO2 27 06/01/2022 01:30 PM    BUN 12 06/01/2022 01:30 PM    GFRAA >60 05/20/2021 09:33 AM     No results found for: HBA1C, [id number]  Lab Results   Component Value Date/Time    CHOL 133 05/18/2022 08:55 AM    HDL 61 05/18/2022 08:55 AM    VLDL 12 05/18/2022 08:55 AM     No results found for: VITD3, VD3RIA    Lab Results   Component Value Date/Time    TSH 1.280 05/18/2022 08:55 AM

## 2023-06-23 ENCOUNTER — OFFICE VISIT (OUTPATIENT)
Facility: CLINIC | Age: 78
End: 2023-06-23
Payer: MEDICARE

## 2023-06-23 VITALS
SYSTOLIC BLOOD PRESSURE: 102 MMHG | WEIGHT: 185 LBS | HEIGHT: 69 IN | BODY MASS INDEX: 27.4 KG/M2 | HEART RATE: 60 BPM | RESPIRATION RATE: 16 BRPM | DIASTOLIC BLOOD PRESSURE: 68 MMHG | OXYGEN SATURATION: 97 % | TEMPERATURE: 97.8 F

## 2023-06-23 DIAGNOSIS — I10 ESSENTIAL (PRIMARY) HYPERTENSION: Primary | ICD-10-CM

## 2023-06-23 DIAGNOSIS — E78.2 MIXED HYPERLIPIDEMIA: ICD-10-CM

## 2023-06-23 DIAGNOSIS — R73.03 PREDIABETES: ICD-10-CM

## 2023-06-23 DIAGNOSIS — K21.9 GASTROESOPHAGEAL REFLUX DISEASE, UNSPECIFIED WHETHER ESOPHAGITIS PRESENT: ICD-10-CM

## 2023-06-23 DIAGNOSIS — F41.9 ANXIETY: ICD-10-CM

## 2023-06-23 PROBLEM — E78.9 BORDERLINE HIGH SERUM CHOLESTEROL: Status: RESOLVED | Noted: 2020-02-25 | Resolved: 2023-06-23

## 2023-06-23 PROBLEM — R73.09 ELEVATED GLUCOSE: Status: RESOLVED | Noted: 2017-12-15 | Resolved: 2023-06-23

## 2023-06-23 PROBLEM — R41.3 MEMORY CHANGE: Status: RESOLVED | Noted: 2019-08-08 | Resolved: 2023-06-23

## 2023-06-23 PROCEDURE — G8427 DOCREV CUR MEDS BY ELIG CLIN: HCPCS | Performed by: STUDENT IN AN ORGANIZED HEALTH CARE EDUCATION/TRAINING PROGRAM

## 2023-06-23 PROCEDURE — 3078F DIAST BP <80 MM HG: CPT | Performed by: STUDENT IN AN ORGANIZED HEALTH CARE EDUCATION/TRAINING PROGRAM

## 2023-06-23 PROCEDURE — 4004F PT TOBACCO SCREEN RCVD TLK: CPT | Performed by: STUDENT IN AN ORGANIZED HEALTH CARE EDUCATION/TRAINING PROGRAM

## 2023-06-23 PROCEDURE — G8419 CALC BMI OUT NRM PARAM NOF/U: HCPCS | Performed by: STUDENT IN AN ORGANIZED HEALTH CARE EDUCATION/TRAINING PROGRAM

## 2023-06-23 PROCEDURE — 3074F SYST BP LT 130 MM HG: CPT | Performed by: STUDENT IN AN ORGANIZED HEALTH CARE EDUCATION/TRAINING PROGRAM

## 2023-06-23 PROCEDURE — 1123F ACP DISCUSS/DSCN MKR DOCD: CPT | Performed by: STUDENT IN AN ORGANIZED HEALTH CARE EDUCATION/TRAINING PROGRAM

## 2023-06-23 PROCEDURE — 99214 OFFICE O/P EST MOD 30 MIN: CPT | Performed by: STUDENT IN AN ORGANIZED HEALTH CARE EDUCATION/TRAINING PROGRAM

## 2023-06-23 RX ORDER — HYDROXYZINE HYDROCHLORIDE 10 MG/1
10 TABLET, FILM COATED ORAL EVERY 8 HOURS PRN
Qty: 30 TABLET | Refills: 0 | Status: SHIPPED | OUTPATIENT
Start: 2023-06-23 | End: 2023-07-03

## 2023-06-23 RX ORDER — LISINOPRIL 10 MG/1
10 TABLET ORAL DAILY
Qty: 90 TABLET | Refills: 1 | Status: SHIPPED | OUTPATIENT
Start: 2023-06-23

## 2023-06-23 SDOH — ECONOMIC STABILITY: FOOD INSECURITY: WITHIN THE PAST 12 MONTHS, YOU WORRIED THAT YOUR FOOD WOULD RUN OUT BEFORE YOU GOT MONEY TO BUY MORE.: NEVER TRUE

## 2023-06-23 SDOH — ECONOMIC STABILITY: FOOD INSECURITY: WITHIN THE PAST 12 MONTHS, THE FOOD YOU BOUGHT JUST DIDN'T LAST AND YOU DIDN'T HAVE MONEY TO GET MORE.: NEVER TRUE

## 2023-06-23 SDOH — ECONOMIC STABILITY: INCOME INSECURITY: HOW HARD IS IT FOR YOU TO PAY FOR THE VERY BASICS LIKE FOOD, HOUSING, MEDICAL CARE, AND HEATING?: NOT HARD AT ALL

## 2023-06-23 SDOH — ECONOMIC STABILITY: HOUSING INSECURITY
IN THE LAST 12 MONTHS, WAS THERE A TIME WHEN YOU DID NOT HAVE A STEADY PLACE TO SLEEP OR SLEPT IN A SHELTER (INCLUDING NOW)?: NO

## 2023-06-23 ASSESSMENT — ENCOUNTER SYMPTOMS
COUGH: 0
ABDOMINAL PAIN: 0
VOMITING: 0
RHINORRHEA: 0
NAUSEA: 0
SHORTNESS OF BREATH: 0

## 2023-06-23 ASSESSMENT — PATIENT HEALTH QUESTIONNAIRE - PHQ9
2. FEELING DOWN, DEPRESSED OR HOPELESS: 0
SUM OF ALL RESPONSES TO PHQ QUESTIONS 1-9: 0
SUM OF ALL RESPONSES TO PHQ QUESTIONS 1-9: 0
SUM OF ALL RESPONSES TO PHQ9 QUESTIONS 1 & 2: 0
1. LITTLE INTEREST OR PLEASURE IN DOING THINGS: 0
SUM OF ALL RESPONSES TO PHQ QUESTIONS 1-9: 0
SUM OF ALL RESPONSES TO PHQ QUESTIONS 1-9: 0

## 2023-06-23 NOTE — PROGRESS NOTES
1. Have you been to the ER, urgent care clinic since your last visit? Hospitalized since your last visit? No    2. Have you seen or consulted any other health care providers outside of the [address] [place] [address] since your last visit? Include any pap smears or colon screening. No    Chief Complaint   Patient presents with    Follow-up    Discuss Labs    Hypertension           Anxiety     Health Maintenance Due   Topic Date Due    DTaP/Tdap/Td vaccine (1 - Tdap) Never done    Shingles vaccine (1 of 2) Never done    COVID-19 Vaccine (4 - Booster for Pfizer series) 11/21/2021    Lipids  05/18/2023     PHQ-9 Total Score: 0 (6/23/2023  2:09 PM)    AMB Abuse Screening 6/23/2023   Do you ever feel afraid of your partner? N   Are you in a relationship with someone who physically or mentally threatens you? N   Is it safe for you to go home? Y     Amb Fall Risk Assessment and TUG Test 6/23/2023   Do you feel unsteady or are you worried about falling?  no   2 or more falls in past year? no   Fall with injury in past year? no   Fall in past 12 months? -   Able to walk?  -     Vitals:    06/23/23 1412   BP: 102/68   Pulse: 60   Resp: 16   Temp: 97.8 °F (36.6 °C)   SpO2: 97%

## 2023-06-23 NOTE — PROGRESS NOTES
Assessment/Plan:     Diagnoses and all orders for this visit:    Essential (primary) hypertension  -     Microalbumin / Creatinine Urine Ratio; Future  -     TSH; Future  -     Comprehensive Metabolic Panel; Future  -     CBC; Future  -     lisinopril (PRINIVIL;ZESTRIL) 10 MG tablet; Take 1 tablet by mouth daily  -Chronic. BP running slightly low on current regimen  -Patient currently on lisinopril 10 mg daily - hydrochlorothiazide 25 mg daily  -Therefore will discontinue the hydrochlorothiazide and continue lisinopril 10 mg daily only  -Check routine lab work today. -Recommend nurse visit for BP check in 2 weeks    Mixed hyperlipidemia  -     Lipid Panel; Future  -     Comprehensive Metabolic Panel; Future  -     CBC; Future  -Chronic. Presumed stable. -Check lipid panel today  -Continue on Crestor daily    Gastroesophageal reflux disease, unspecified whether esophagitis present  -Chronic. Stable. -Patient states he has had an EGD completed in the past  -Continue on pantoprazole daily  -Will occasionally use famotidine as needed as well    Prediabetes  -     Hemoglobin A1C; Future  -Due for repeat A1c today    Anxiety  -     hydrOXYzine HCl (ATARAX) 10 MG tablet; Take 1 tablet by mouth every 8 hours as needed for Anxiety  -Intermittent. Patient gets very anxious when in the car for prolonged periods  -Therefore can try low-dose hydroxyzine as needed during car ride  -Discussed medication risks and side effects including drowsiness and increased fall risk. Patient and wife state understanding         Return in about 6 months (around 12/23/2023), or if symptoms worsen or fail to improve. Discussed expected course/resolution/complications of diagnosis in detail with patient. Medication risks/benefits/costs/interactions/alternatives discussed with patient. Pt expressed understanding with the diagnosis and plan.       Subjective:      Geraldean Hashimoto is a 70 y.o. male who presents for had concerns

## 2023-06-29 DIAGNOSIS — R73.03 PREDIABETES: ICD-10-CM

## 2023-06-29 DIAGNOSIS — I10 ESSENTIAL (PRIMARY) HYPERTENSION: ICD-10-CM

## 2023-06-29 DIAGNOSIS — E78.2 MIXED HYPERLIPIDEMIA: ICD-10-CM

## 2023-06-29 LAB
ALBUMIN SERPL-MCNC: 3.6 G/DL (ref 3.5–5)
ALBUMIN/GLOB SERPL: 1.2 (ref 1.1–2.2)
ALP SERPL-CCNC: 60 U/L (ref 45–117)
ALT SERPL-CCNC: 22 U/L (ref 12–78)
ANION GAP SERPL CALC-SCNC: 5 MMOL/L (ref 5–15)
AST SERPL-CCNC: 18 U/L (ref 15–37)
BILIRUB SERPL-MCNC: 0.3 MG/DL (ref 0.2–1)
BUN SERPL-MCNC: 15 MG/DL (ref 6–20)
BUN/CREAT SERPL: 19 (ref 12–20)
CALCIUM SERPL-MCNC: 9.6 MG/DL (ref 8.5–10.1)
CHLORIDE SERPL-SCNC: 103 MMOL/L (ref 97–108)
CHOLEST SERPL-MCNC: 148 MG/DL
CO2 SERPL-SCNC: 29 MMOL/L (ref 21–32)
CREAT SERPL-MCNC: 0.77 MG/DL (ref 0.7–1.3)
CREAT UR-MCNC: 127 MG/DL
ERYTHROCYTE [DISTWIDTH] IN BLOOD BY AUTOMATED COUNT: 12.7 % (ref 11.5–14.5)
GLOBULIN SER CALC-MCNC: 3.1 G/DL (ref 2–4)
GLUCOSE SERPL-MCNC: 101 MG/DL (ref 65–100)
HCT VFR BLD AUTO: 42.7 % (ref 36.6–50.3)
HDLC SERPL-MCNC: 56 MG/DL
HDLC SERPL: 2.6 (ref 0–5)
HGB BLD-MCNC: 13.9 G/DL (ref 12.1–17)
LDLC SERPL CALC-MCNC: 67.8 MG/DL (ref 0–100)
MCH RBC QN AUTO: 30.3 PG (ref 26–34)
MCHC RBC AUTO-ENTMCNC: 32.6 G/DL (ref 30–36.5)
MCV RBC AUTO: 93 FL (ref 80–99)
MICROALBUMIN UR-MCNC: 0.61 MG/DL
MICROALBUMIN/CREAT UR-RTO: 5 MG/G (ref 0–30)
NRBC # BLD: 0 K/UL (ref 0–0.01)
NRBC BLD-RTO: 0 PER 100 WBC
PLATELET # BLD AUTO: 277 K/UL (ref 150–400)
PMV BLD AUTO: 10.6 FL (ref 8.9–12.9)
POTASSIUM SERPL-SCNC: 4.2 MMOL/L (ref 3.5–5.1)
PROT SERPL-MCNC: 6.7 G/DL (ref 6.4–8.2)
RBC # BLD AUTO: 4.59 M/UL (ref 4.1–5.7)
SODIUM SERPL-SCNC: 137 MMOL/L (ref 136–145)
TRIGL SERPL-MCNC: 121 MG/DL
TSH SERPL DL<=0.05 MIU/L-ACNC: 1.39 UIU/ML (ref 0.36–3.74)
VLDLC SERPL CALC-MCNC: 24.2 MG/DL
WBC # BLD AUTO: 6.7 K/UL (ref 4.1–11.1)

## 2023-06-30 LAB
EST. AVERAGE GLUCOSE BLD GHB EST-MCNC: 108 MG/DL
HBA1C MFR BLD: 5.4 % (ref 4–5.6)

## 2023-07-11 RX ORDER — ROSUVASTATIN CALCIUM 10 MG/1
TABLET, COATED ORAL
Qty: 30 TABLET | Refills: 0 | Status: SHIPPED | OUTPATIENT
Start: 2023-07-11

## 2023-07-11 NOTE — TELEPHONE ENCOUNTER
PCP: Agustin Coles MD    Last appt: [unfilled]  Future Appointments   Date Time Provider [address]   9/6/2023  2:45 PM Reginaldo Yu MD [facility] BS AMB       Requested Prescriptions     Pending Prescriptions Disp Refills    rosuvastatin (CRESTOR) 10 MG tablet [Pharmacy Med Name: ROSUVASTATIN CALCIUM 10 MG TAB] 90 tablet      Sig: TAKE ONE TABLET BY MOUTH ONCE NIGHTLY       Prior labs and Blood pressures:  BP Readings from Last 3 Encounters:   06/23/23 102/68   03/02/23 120/62   12/14/22 92/61     Lab Results   Component Value Date/Time     06/29/2023 09:00 AM    K 4.2 06/29/2023 09:00 AM     06/29/2023 09:00 AM    CO2 29 06/29/2023 09:00 AM    BUN 15 06/29/2023 09:00 AM    GFRAA >60 05/20/2021 09:33 AM     No results found for: HBA1C, [id number]  Lab Results   Component Value Date/Time    CHOL 148 06/29/2023 09:00 AM    HDL 56 06/29/2023 09:00 AM    VLDL 12 05/18/2022 08:55 AM     No results found for: VITD3, VD3RIA    Lab Results   Component Value Date/Time    TSH 1.280 05/18/2022 08:55 AM

## 2023-09-06 ENCOUNTER — OFFICE VISIT (OUTPATIENT)
Age: 78
End: 2023-09-06
Payer: MEDICARE

## 2023-09-06 ENCOUNTER — PROCEDURE VISIT (OUTPATIENT)
Age: 78
End: 2023-09-06

## 2023-09-06 VITALS
HEART RATE: 67 BPM | OXYGEN SATURATION: 97 % | DIASTOLIC BLOOD PRESSURE: 61 MMHG | SYSTOLIC BLOOD PRESSURE: 107 MMHG | RESPIRATION RATE: 12 BRPM

## 2023-09-06 DIAGNOSIS — G30.1 ALZHEIMER'S DISEASE WITH LATE ONSET (CODE) (HCC): ICD-10-CM

## 2023-09-06 DIAGNOSIS — F02.B0 MODERATE LATE ONSET ALZHEIMER'S DEMENTIA WITHOUT BEHAVIORAL DISTURBANCE, PSYCHOTIC DISTURBANCE, MOOD DISTURBANCE, OR ANXIETY (HCC): Primary | ICD-10-CM

## 2023-09-06 DIAGNOSIS — G30.1 MODERATE LATE ONSET ALZHEIMER'S DEMENTIA WITHOUT BEHAVIORAL DISTURBANCE, PSYCHOTIC DISTURBANCE, MOOD DISTURBANCE, OR ANXIETY (HCC): Primary | ICD-10-CM

## 2023-09-06 PROCEDURE — 3074F SYST BP LT 130 MM HG: CPT | Performed by: PSYCHIATRY & NEUROLOGY

## 2023-09-06 PROCEDURE — 3078F DIAST BP <80 MM HG: CPT | Performed by: PSYCHIATRY & NEUROLOGY

## 2023-09-06 PROCEDURE — 99214 OFFICE O/P EST MOD 30 MIN: CPT | Performed by: PSYCHIATRY & NEUROLOGY

## 2023-09-06 PROCEDURE — G8419 CALC BMI OUT NRM PARAM NOF/U: HCPCS | Performed by: PSYCHIATRY & NEUROLOGY

## 2023-09-06 PROCEDURE — 96132 NRPSYC TST EVAL PHYS/QHP 1ST: CPT | Performed by: PSYCHIATRY & NEUROLOGY

## 2023-09-06 PROCEDURE — G8427 DOCREV CUR MEDS BY ELIG CLIN: HCPCS | Performed by: PSYCHIATRY & NEUROLOGY

## 2023-09-06 PROCEDURE — 1123F ACP DISCUSS/DSCN MKR DOCD: CPT | Performed by: PSYCHIATRY & NEUROLOGY

## 2023-09-06 PROCEDURE — 1036F TOBACCO NON-USER: CPT | Performed by: PSYCHIATRY & NEUROLOGY

## 2023-09-06 PROCEDURE — 96138 PSYCL/NRPSYC TECH 1ST: CPT | Performed by: PSYCHIATRY & NEUROLOGY

## 2023-09-06 RX ORDER — MEMANTINE HYDROCHLORIDE 10 MG/1
10 TABLET ORAL 2 TIMES DAILY
Qty: 180 TABLET | Refills: 3 | Status: SHIPPED | OUTPATIENT
Start: 2023-09-06

## 2023-09-06 RX ORDER — DONEPEZIL HYDROCHLORIDE 10 MG/1
10 TABLET, FILM COATED ORAL DAILY
Qty: 90 TABLET | Refills: 3 | Status: SHIPPED | OUTPATIENT
Start: 2023-09-06

## 2023-09-06 ASSESSMENT — PATIENT HEALTH QUESTIONNAIRE - PHQ9
2. FEELING DOWN, DEPRESSED OR HOPELESS: 0
SUM OF ALL RESPONSES TO PHQ QUESTIONS 1-9: 0
SUM OF ALL RESPONSES TO PHQ9 QUESTIONS 1 & 2: 0
SUM OF ALL RESPONSES TO PHQ QUESTIONS 1-9: 0
1. LITTLE INTEREST OR PLEASURE IN DOING THINGS: 0

## 2023-09-06 NOTE — PROGRESS NOTES
[facility] and [address] at [facility] at [address], [id number] [place] [address]   [phone]              Chief Complaint   Patient presents with    Dementia     Accompanied by Sridhar Marquis, spouse today. No changes     Current Outpatient Medications   Medication Sig Dispense Refill    donepezil (ARICEPT) 10 MG tablet Take 1 tablet by mouth daily 90 tablet 3    memantine (NAMENDA) 10 MG tablet Take 1 tablet by mouth 2 times daily 180 tablet 3    rosuvastatin (CRESTOR) 10 MG tablet TAKE ONE TABLET BY MOUTH ONCE NIGHTLY 30 tablet 0    lisinopril (PRINIVIL;ZESTRIL) 10 MG tablet Take 1 tablet by mouth daily 90 tablet 1    pantoprazole (PROTONIX) 40 MG tablet TAKE ONE TABLET BY MOUTH DAILY 90 tablet 1     No current facility-administered medications for this visit. No Known Allergies  Social History     Tobacco Use    Smoking status: Former     Packs/day: 0.00     Types: Cigarettes     Quit date: 1997     Years since quittin.8    Smokeless tobacco: Never   Substance Use Topics    Alcohol use: Yes     Alcohol/week: 7.0 standard drinks    Drug use: No     70-year-old gentleman returns today for follow-up Alzheimer's type dementia. Last visit with me was in March and at that time we maintained Aricept and Namenda. Today he is with his wife. He notes that his memory is about the same. His wife notes that he has declined. She has to remind him to shower although he can do that. He does his own dressing as well. He will just stare at the television and sometimes is not even on. He is not doing the puzzles that his daughter got for him previously. He will play golf game on the computer at times. Tolerating medicines. Examination  /61   Pulse 67   Resp 12   SpO2 97%   Pleasant gentleman. He is well-appearing. He is awake and alert.   He is

## 2024-02-28 ENCOUNTER — OFFICE VISIT (OUTPATIENT)
Facility: CLINIC | Age: 79
End: 2024-02-28
Payer: MEDICARE

## 2024-02-28 VITALS
RESPIRATION RATE: 16 BRPM | HEIGHT: 69 IN | DIASTOLIC BLOOD PRESSURE: 73 MMHG | HEART RATE: 57 BPM | WEIGHT: 176 LBS | OXYGEN SATURATION: 97 % | SYSTOLIC BLOOD PRESSURE: 109 MMHG | TEMPERATURE: 97.6 F | BODY MASS INDEX: 26.07 KG/M2

## 2024-02-28 DIAGNOSIS — I10 ESSENTIAL (PRIMARY) HYPERTENSION: ICD-10-CM

## 2024-02-28 DIAGNOSIS — K21.9 GASTROESOPHAGEAL REFLUX DISEASE, UNSPECIFIED WHETHER ESOPHAGITIS PRESENT: ICD-10-CM

## 2024-02-28 DIAGNOSIS — Z00.00 MEDICARE ANNUAL WELLNESS VISIT, SUBSEQUENT: Primary | ICD-10-CM

## 2024-02-28 DIAGNOSIS — G30.1 MODERATE LATE ONSET ALZHEIMER'S DEMENTIA WITHOUT BEHAVIORAL DISTURBANCE, PSYCHOTIC DISTURBANCE, MOOD DISTURBANCE, OR ANXIETY (HCC): ICD-10-CM

## 2024-02-28 DIAGNOSIS — E78.2 MIXED HYPERLIPIDEMIA: ICD-10-CM

## 2024-02-28 DIAGNOSIS — F02.B0 MODERATE LATE ONSET ALZHEIMER'S DEMENTIA WITHOUT BEHAVIORAL DISTURBANCE, PSYCHOTIC DISTURBANCE, MOOD DISTURBANCE, OR ANXIETY (HCC): ICD-10-CM

## 2024-02-28 PROCEDURE — 99214 OFFICE O/P EST MOD 30 MIN: CPT | Performed by: STUDENT IN AN ORGANIZED HEALTH CARE EDUCATION/TRAINING PROGRAM

## 2024-02-28 PROCEDURE — G8484 FLU IMMUNIZE NO ADMIN: HCPCS | Performed by: STUDENT IN AN ORGANIZED HEALTH CARE EDUCATION/TRAINING PROGRAM

## 2024-02-28 PROCEDURE — 3074F SYST BP LT 130 MM HG: CPT | Performed by: STUDENT IN AN ORGANIZED HEALTH CARE EDUCATION/TRAINING PROGRAM

## 2024-02-28 PROCEDURE — G0439 PPPS, SUBSEQ VISIT: HCPCS | Performed by: STUDENT IN AN ORGANIZED HEALTH CARE EDUCATION/TRAINING PROGRAM

## 2024-02-28 PROCEDURE — G8427 DOCREV CUR MEDS BY ELIG CLIN: HCPCS | Performed by: STUDENT IN AN ORGANIZED HEALTH CARE EDUCATION/TRAINING PROGRAM

## 2024-02-28 PROCEDURE — 3078F DIAST BP <80 MM HG: CPT | Performed by: STUDENT IN AN ORGANIZED HEALTH CARE EDUCATION/TRAINING PROGRAM

## 2024-02-28 PROCEDURE — G8419 CALC BMI OUT NRM PARAM NOF/U: HCPCS | Performed by: STUDENT IN AN ORGANIZED HEALTH CARE EDUCATION/TRAINING PROGRAM

## 2024-02-28 PROCEDURE — 1123F ACP DISCUSS/DSCN MKR DOCD: CPT | Performed by: STUDENT IN AN ORGANIZED HEALTH CARE EDUCATION/TRAINING PROGRAM

## 2024-02-28 PROCEDURE — 1036F TOBACCO NON-USER: CPT | Performed by: STUDENT IN AN ORGANIZED HEALTH CARE EDUCATION/TRAINING PROGRAM

## 2024-02-28 ASSESSMENT — ENCOUNTER SYMPTOMS
RHINORRHEA: 0
ABDOMINAL PAIN: 0
COUGH: 0
NAUSEA: 0
SHORTNESS OF BREATH: 0
VOMITING: 0

## 2024-02-28 ASSESSMENT — PATIENT HEALTH QUESTIONNAIRE - PHQ9
2. FEELING DOWN, DEPRESSED OR HOPELESS: 0
SUM OF ALL RESPONSES TO PHQ QUESTIONS 1-9: 0
SUM OF ALL RESPONSES TO PHQ9 QUESTIONS 1 & 2: 0
SUM OF ALL RESPONSES TO PHQ QUESTIONS 1-9: 0
SUM OF ALL RESPONSES TO PHQ QUESTIONS 1-9: 0
1. LITTLE INTEREST OR PLEASURE IN DOING THINGS: 0
SUM OF ALL RESPONSES TO PHQ QUESTIONS 1-9: 0

## 2024-02-28 ASSESSMENT — LIFESTYLE VARIABLES
HOW MANY STANDARD DRINKS CONTAINING ALCOHOL DO YOU HAVE ON A TYPICAL DAY: PATIENT DOES NOT DRINK
HOW OFTEN DO YOU HAVE A DRINK CONTAINING ALCOHOL: NEVER

## 2024-02-28 NOTE — PROGRESS NOTES
Identified pt with two pt identifiers(name and ). Reviewed record in preparation for visit and have obtained necessary documentation.  Chief Complaint   Patient presents with    Medicare AWV        Health Maintenance Due   Topic    DTaP/Tdap/Td vaccine (1 - Tdap)    Shingles vaccine (1 of 2)    Respiratory Syncytial Virus (RSV) Pregnant or age 60 yrs+ (1 - 1-dose 60+ series)    Pneumococcal 65+ years Vaccine (2 - PCV)    Flu vaccine (1)    COVID-19 Vaccine ( -  season)    Annual Wellness Visit (Medicare Advantage)        Coordination of Care Questionnaire:  :   1) Have you been to an emergency room, urgent care, or hospitalized since your last visit?  If yes, where when, and reason for visit? no      2. Have seen or consulted any other health care provider since your last visit?   If yes, where when, and reason for visit?  no        Patient is accompanied by self, wife I have received verbal consent from Dev Cha to discuss any/all medical information while they are present in the room.

## 2024-02-28 NOTE — PATIENT INSTRUCTIONS
cereals and breads, oatmeal, beans, brown rice, citrus fruits, and apples.     Eat lean proteins. Heart-healthy proteins include seafood, lean meats and poultry, eggs, beans, peas, nuts, seeds, and soy products.     Limit drinks and foods with added sugar. These include candy, desserts, and soda pop.   Lifestyle changes    If your doctor recommends it, get more exercise. Walking is a good choice. Bit by bit, increase the amount you walk every day. Try for at least 30 minutes on most days of the week. You also may want to swim, bike, or do other activities.     Do not smoke. If you need help quitting, talk to your doctor about stop-smoking programs and medicines. These can increase your chances of quitting for good. Quitting smoking may be the most important step you can take to protect your heart. It is never too late to quit.     Limit alcohol to 2 drinks a day for men and 1 drink a day for women. Too much alcohol can cause health problems.     Manage other health problems such as diabetes, high blood pressure, and high cholesterol. If you think you may have a problem with alcohol or drug use, talk to your doctor.   Medicines    Take your medicines exactly as prescribed. Call your doctor if you think you are having a problem with your medicine.     If your doctor recommends aspirin, take the amount directed each day. Make sure you take aspirin and not another kind of pain reliever, such as acetaminophen (Tylenol).   When should you call for help?   Call 911 if you have symptoms of a heart attack. These may include:    Chest pain or pressure, or a strange feeling in the chest.     Sweating.     Shortness of breath.     Pain, pressure, or a strange feeling in the back, neck, jaw, or upper belly or in one or both shoulders or arms.     Lightheadedness or sudden weakness.     A fast or irregular heartbeat.   After you call 911, the  may tell you to chew 1 adult-strength or 2 to 4 low-dose aspirin. Wait for an

## 2024-02-28 NOTE — PROGRESS NOTES
Assessment/Plan:     Diagnoses and all orders for this visit:    Medicare annual wellness visit, subsequent  -Medicare wellness questionnaire reviewed  -Patient declines immunizations at this time  -Screening recommendations reviewed    Essential (primary) hypertension  -     Basic Metabolic Panel; Future  -Patient has stopped taking all of his medications over the last week  -Blood pressure is very well-controlled in office today  -Therefore can remain off the lisinopril and monitor home blood pressure readings  -Follow-up in 3 months for reevaluation to ensure stability of his blood pressure  -If noting elevation in BP at home recommend follow-up sooner    Moderate late onset Alzheimer's dementia without behavioral disturbance, psychotic disturbance, mood disturbance, or anxiety (HCC)  -Chronic.  Stable.  -Followed and managed by neurology  -Patient has stopped taking his donepezil and memantine.  Advised to discuss this with his neurologist next week    Mixed hyperlipidemia  -     Lipid Panel; Future  -Chronic.  Presumed stable.  -Patient has discontinued his Crestor.  -Would like to remain off of medication.  -Therefore repeat lipid panel in another month for reevaluation off of medication    Gastroesophageal reflux disease, unspecified whether esophagitis present  -Chronic.  Stable.  Only intermittent.  -Therefore can discontinue the pantoprazole  -Use famotidine as needed  -Return should symptoms worsen or fail to improve      Return in about 3 months (around 5/28/2024), or if symptoms worsen or fail to improve, for BP check.     Discussed expected course/resolution/complications of diagnosis in detail with patient.    Medication risks/benefits/costs/interactions/alternatives discussed with patient.    Pt expressed understanding with the diagnosis and plan      Subjective:      Dev Cha is a 78 y.o. male who presents for had concerns including Medicare AWV.     Current Outpatient Medications   Medication

## 2024-03-06 ENCOUNTER — OFFICE VISIT (OUTPATIENT)
Age: 79
End: 2024-03-06
Payer: MEDICARE

## 2024-03-06 VITALS
SYSTOLIC BLOOD PRESSURE: 118 MMHG | HEART RATE: 59 BPM | OXYGEN SATURATION: 97 % | DIASTOLIC BLOOD PRESSURE: 71 MMHG | RESPIRATION RATE: 16 BRPM

## 2024-03-06 DIAGNOSIS — G30.1 MODERATE LATE ONSET ALZHEIMER'S DEMENTIA WITHOUT BEHAVIORAL DISTURBANCE, PSYCHOTIC DISTURBANCE, MOOD DISTURBANCE, OR ANXIETY (HCC): Primary | ICD-10-CM

## 2024-03-06 DIAGNOSIS — F02.B0 MODERATE LATE ONSET ALZHEIMER'S DEMENTIA WITHOUT BEHAVIORAL DISTURBANCE, PSYCHOTIC DISTURBANCE, MOOD DISTURBANCE, OR ANXIETY (HCC): Primary | ICD-10-CM

## 2024-03-06 PROCEDURE — G8484 FLU IMMUNIZE NO ADMIN: HCPCS | Performed by: PSYCHIATRY & NEUROLOGY

## 2024-03-06 PROCEDURE — 96132 NRPSYC TST EVAL PHYS/QHP 1ST: CPT | Performed by: PSYCHIATRY & NEUROLOGY

## 2024-03-06 PROCEDURE — G8419 CALC BMI OUT NRM PARAM NOF/U: HCPCS | Performed by: PSYCHIATRY & NEUROLOGY

## 2024-03-06 PROCEDURE — 1036F TOBACCO NON-USER: CPT | Performed by: PSYCHIATRY & NEUROLOGY

## 2024-03-06 PROCEDURE — 1123F ACP DISCUSS/DSCN MKR DOCD: CPT | Performed by: PSYCHIATRY & NEUROLOGY

## 2024-03-06 PROCEDURE — G8427 DOCREV CUR MEDS BY ELIG CLIN: HCPCS | Performed by: PSYCHIATRY & NEUROLOGY

## 2024-03-06 PROCEDURE — 3078F DIAST BP <80 MM HG: CPT | Performed by: PSYCHIATRY & NEUROLOGY

## 2024-03-06 PROCEDURE — 3074F SYST BP LT 130 MM HG: CPT | Performed by: PSYCHIATRY & NEUROLOGY

## 2024-03-06 PROCEDURE — 99214 OFFICE O/P EST MOD 30 MIN: CPT | Performed by: PSYCHIATRY & NEUROLOGY

## 2024-03-06 PROCEDURE — 96138 PSYCL/NRPSYC TECH 1ST: CPT | Performed by: PSYCHIATRY & NEUROLOGY

## 2024-03-06 ASSESSMENT — PATIENT HEALTH QUESTIONNAIRE - PHQ9: DEPRESSION UNABLE TO ASSESS: FUNCTIONAL CAPACITY MOTIVATION LIMITS ACCURACY

## 2024-03-06 NOTE — PATIENT INSTRUCTIONS
Restart taking your donepezil by taking one half of a 10 mg tablet daily x 1 month then start taking a full 10 mg tablet once daily thereafter    Restart taking your memantine by taking 1/2 of a 10 mg tablet once daily x 7 then one half of a 10 mg tablet twice daily x 7 days then 1 full 10 mg tablet in the morning and one half 10 mg tablet in the evening x 7 days then start taking 110 mg tablet twice daily thereafter    Do something every day to work  Such as playing a game on the phone, word puzzles such as seek and find word searches, playing cards, putting together jigsaw puzzle, playing checkers etc. are different examples of ideas to keep your mind active
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
## 2024-04-11 RX ORDER — LISINOPRIL 10 MG/1
10 TABLET ORAL DAILY
COMMUNITY
End: 2024-04-11 | Stop reason: ALTCHOICE

## 2024-04-11 RX ORDER — LISINOPRIL 10 MG/1
10 TABLET ORAL DAILY
Qty: 90 TABLET | Refills: 0 | OUTPATIENT
Start: 2024-04-11

## 2024-04-11 NOTE — TELEPHONE ENCOUNTER
Chief Complaint   Patient presents with    Medication Refill       Requested Prescriptions     Pending Prescriptions Disp Refills    lisinopril (PRINIVIL;ZESTRIL) 10 MG tablet 90 tablet 0     Sig: Take 1 tablet by mouth daily       Allergies:  No Known Allergies    Last visit with clinic:  2/28/2024   Next visit with clinic: Visit date not found     Last visit with this provider: 2/28/2024   Next Visit with this provider: Visit date not found    Signed by Ivonne Cherry MA CMA  04/11/24  1:24 PM

## 2024-06-13 DIAGNOSIS — E78.2 MIXED HYPERLIPIDEMIA: ICD-10-CM

## 2024-06-13 DIAGNOSIS — I10 ESSENTIAL (PRIMARY) HYPERTENSION: ICD-10-CM

## 2024-06-13 LAB
ANION GAP SERPL CALC-SCNC: 4 MMOL/L (ref 5–15)
BUN SERPL-MCNC: 17 MG/DL (ref 6–20)
BUN/CREAT SERPL: 24 (ref 12–20)
CALCIUM SERPL-MCNC: 9.3 MG/DL (ref 8.5–10.1)
CHLORIDE SERPL-SCNC: 105 MMOL/L (ref 97–108)
CHOLEST SERPL-MCNC: 187 MG/DL
CO2 SERPL-SCNC: 30 MMOL/L (ref 21–32)
CREAT SERPL-MCNC: 0.71 MG/DL (ref 0.7–1.3)
GLUCOSE SERPL-MCNC: 99 MG/DL (ref 65–100)
HDLC SERPL-MCNC: 56 MG/DL
HDLC SERPL: 3.3 (ref 0–5)
LDLC SERPL CALC-MCNC: 116.2 MG/DL (ref 0–100)
POTASSIUM SERPL-SCNC: 4.1 MMOL/L (ref 3.5–5.1)
SODIUM SERPL-SCNC: 139 MMOL/L (ref 136–145)
TRIGL SERPL-MCNC: 74 MG/DL
VLDLC SERPL CALC-MCNC: 14.8 MG/DL

## 2024-06-18 ENCOUNTER — OFFICE VISIT (OUTPATIENT)
Facility: CLINIC | Age: 79
End: 2024-06-18
Payer: MEDICARE

## 2024-06-18 VITALS
HEIGHT: 69 IN | DIASTOLIC BLOOD PRESSURE: 68 MMHG | BODY MASS INDEX: 26.81 KG/M2 | TEMPERATURE: 97.3 F | HEART RATE: 62 BPM | SYSTOLIC BLOOD PRESSURE: 105 MMHG | WEIGHT: 181 LBS | OXYGEN SATURATION: 98 %

## 2024-06-18 DIAGNOSIS — I10 ESSENTIAL (PRIMARY) HYPERTENSION: Primary | ICD-10-CM

## 2024-06-18 DIAGNOSIS — B35.1 ONYCHOMYCOSIS: ICD-10-CM

## 2024-06-18 DIAGNOSIS — E78.2 MIXED HYPERLIPIDEMIA: ICD-10-CM

## 2024-06-18 DIAGNOSIS — F02.818 DEMENTIA IN OTHER DISEASES CLASSIFIED ELSEWHERE, UNSPECIFIED SEVERITY, WITH OTHER BEHAVIORAL DISTURBANCE (HCC): ICD-10-CM

## 2024-06-18 DIAGNOSIS — G60.0 CMT (CHARCOT-MARIE-TOOTH DISEASE): ICD-10-CM

## 2024-06-18 PROBLEM — F22 DELUSIONAL DISORDERS (HCC): Status: ACTIVE | Noted: 2024-06-18

## 2024-06-18 PROBLEM — F22 DELUSIONAL DISORDERS (HCC): Status: RESOLVED | Noted: 2024-06-18 | Resolved: 2024-06-18

## 2024-06-18 PROCEDURE — 1123F ACP DISCUSS/DSCN MKR DOCD: CPT | Performed by: STUDENT IN AN ORGANIZED HEALTH CARE EDUCATION/TRAINING PROGRAM

## 2024-06-18 PROCEDURE — 1036F TOBACCO NON-USER: CPT | Performed by: STUDENT IN AN ORGANIZED HEALTH CARE EDUCATION/TRAINING PROGRAM

## 2024-06-18 PROCEDURE — 3078F DIAST BP <80 MM HG: CPT | Performed by: STUDENT IN AN ORGANIZED HEALTH CARE EDUCATION/TRAINING PROGRAM

## 2024-06-18 PROCEDURE — 99213 OFFICE O/P EST LOW 20 MIN: CPT | Performed by: STUDENT IN AN ORGANIZED HEALTH CARE EDUCATION/TRAINING PROGRAM

## 2024-06-18 PROCEDURE — G8419 CALC BMI OUT NRM PARAM NOF/U: HCPCS | Performed by: STUDENT IN AN ORGANIZED HEALTH CARE EDUCATION/TRAINING PROGRAM

## 2024-06-18 PROCEDURE — G8428 CUR MEDS NOT DOCUMENT: HCPCS | Performed by: STUDENT IN AN ORGANIZED HEALTH CARE EDUCATION/TRAINING PROGRAM

## 2024-06-18 PROCEDURE — 3074F SYST BP LT 130 MM HG: CPT | Performed by: STUDENT IN AN ORGANIZED HEALTH CARE EDUCATION/TRAINING PROGRAM

## 2024-06-18 RX ORDER — PANTOPRAZOLE SODIUM 40 MG/1
40 TABLET, DELAYED RELEASE ORAL DAILY
COMMUNITY

## 2024-06-18 ASSESSMENT — PATIENT HEALTH QUESTIONNAIRE - PHQ9
SUM OF ALL RESPONSES TO PHQ QUESTIONS 1-9: 0
SUM OF ALL RESPONSES TO PHQ9 QUESTIONS 1 & 2: 0
1. LITTLE INTEREST OR PLEASURE IN DOING THINGS: NOT AT ALL
SUM OF ALL RESPONSES TO PHQ QUESTIONS 1-9: 0
2. FEELING DOWN, DEPRESSED OR HOPELESS: NOT AT ALL

## 2024-06-18 ASSESSMENT — ENCOUNTER SYMPTOMS
VOMITING: 0
RHINORRHEA: 0
SHORTNESS OF BREATH: 0
ABDOMINAL PAIN: 0
NAUSEA: 0
COUGH: 0

## 2024-06-18 NOTE — PROGRESS NOTES
Identified pt with two pt identifiers(name and ). Reviewed record in preparation for visit and have obtained necessary documentation. All patient medications has been reviewed.  Chief Complaint   Patient presents with    3 Month Follow-Up     Pt is here today for a follow up. Pt did have labs drawn and wants to go over results.    Hypertension       Vitals:    24 1402   BP: 105/68   Pulse: 62   Temp: 97.3 °F (36.3 °C)   SpO2: 98%                   Coordination of Care Questionnaire:   1) Have you been to an emergency room, urgent care, or hospitalized since your last visit?   no     2. Have seen or consulted any other health care provider since your last visit? no        Patient is accompanied by wife I have received verbal consent from Dev Cha to discuss any/all medical information while they are present in the room.

## 2024-06-18 NOTE — PROGRESS NOTES
Assessment/Plan:     Diagnoses and all orders for this visit:    Essential (primary) hypertension  -Chronic.  Well-controlled and at goal  -Patient has been off of blood pressure medication for 3 months and BP remains stable  -Continue with routine monitoring in 6 months    Dementia in other diseases classified elsewhere, unspecified severity, with other behavioral disturbance (HCC)  -Chronic.  Worsening over time.  -Patient refuses to take donepezil and memantine medication  -Continue routine follow-up with neurology    Mixed hyperlipidemia  -Chronic. LDL slightly elevated.  -Patient has previously refused medications  -Therefore continue with a healthy and well-balanced diet.  Remain off statin at this time.    CMT (Charcot-Mariangel-Tooth disease)  -     Kartik Cabrera DPM, Pod[facility]ryNabil (Lisbeth Pressley)  -Chronic.  Patient endorses previous history.  -Symptoms are stable.  -Requesting routine footcare with podiatry.  Referral placed today    Onychomycosis  -     Kartik Cabrera DPM, Podiatry, [place] (Lisbeth Pressley)  -Requesting routine footcare with podiatry.  Referral placed today         Return in about 6 months (around 12/18/2024), or if symptoms worsen or fail to improve.     Discussed expected course/resolution/complications of diagnosis in detail with patient.    Medication risks/benefits/costs/interactions/alternatives discussed with patient.    Pt expressed understanding with the diagnosis and plan      Subjective:      Dev Cha is a 79 y.o. male who presents for had concerns including 3 Month Follow-Up (Pt is here today for a follow up. Pt did have labs drawn and wants to go over results.) and Hypertension.     Current Outpatient Medications   Medication Sig Dispense Refill    pantoprazole (PROTONIX) 40 MG tablet Take 1 tablet by mouth daily       No current facility-administered medications for this visit.       No Known Allergies  Past Medical History:   Diagnosis Date

## 2024-09-11 ENCOUNTER — OFFICE VISIT (OUTPATIENT)
Age: 79
End: 2024-09-11
Payer: MEDICARE

## 2024-09-11 VITALS
BODY MASS INDEX: 26.14 KG/M2 | RESPIRATION RATE: 16 BRPM | SYSTOLIC BLOOD PRESSURE: 118 MMHG | DIASTOLIC BLOOD PRESSURE: 78 MMHG | TEMPERATURE: 96.9 F | HEART RATE: 64 BPM | OXYGEN SATURATION: 97 % | WEIGHT: 177 LBS

## 2024-09-11 DIAGNOSIS — F02.B4 MODERATE LATE ONSET ALZHEIMER'S DEMENTIA WITH ANXIETY (HCC): Primary | ICD-10-CM

## 2024-09-11 DIAGNOSIS — G30.1 MODERATE LATE ONSET ALZHEIMER'S DEMENTIA WITH ANXIETY (HCC): Primary | ICD-10-CM

## 2024-09-11 PROCEDURE — 99213 OFFICE O/P EST LOW 20 MIN: CPT | Performed by: PSYCHIATRY & NEUROLOGY

## 2024-09-11 PROCEDURE — 3074F SYST BP LT 130 MM HG: CPT | Performed by: PSYCHIATRY & NEUROLOGY

## 2024-09-11 PROCEDURE — 1123F ACP DISCUSS/DSCN MKR DOCD: CPT | Performed by: PSYCHIATRY & NEUROLOGY

## 2024-09-11 PROCEDURE — 1036F TOBACCO NON-USER: CPT | Performed by: PSYCHIATRY & NEUROLOGY

## 2024-09-11 PROCEDURE — 3078F DIAST BP <80 MM HG: CPT | Performed by: PSYCHIATRY & NEUROLOGY

## 2024-09-11 PROCEDURE — G8427 DOCREV CUR MEDS BY ELIG CLIN: HCPCS | Performed by: PSYCHIATRY & NEUROLOGY

## 2024-09-11 PROCEDURE — G8419 CALC BMI OUT NRM PARAM NOF/U: HCPCS | Performed by: PSYCHIATRY & NEUROLOGY

## 2024-09-11 RX ORDER — CALCIUM CARBONATE 500 MG/1
1 TABLET, CHEWABLE ORAL DAILY
COMMUNITY

## 2024-09-11 ASSESSMENT — PATIENT HEALTH QUESTIONNAIRE - PHQ9
SUM OF ALL RESPONSES TO PHQ QUESTIONS 1-9: 0
2. FEELING DOWN, DEPRESSED OR HOPELESS: NOT AT ALL
SUM OF ALL RESPONSES TO PHQ QUESTIONS 1-9: 0
1. LITTLE INTEREST OR PLEASURE IN DOING THINGS: NOT AT ALL
SUM OF ALL RESPONSES TO PHQ9 QUESTIONS 1 & 2: 0

## 2024-12-17 ENCOUNTER — OFFICE VISIT (OUTPATIENT)
Facility: CLINIC | Age: 79
End: 2024-12-17
Payer: MEDICARE

## 2024-12-17 VITALS
DIASTOLIC BLOOD PRESSURE: 64 MMHG | TEMPERATURE: 97.3 F | OXYGEN SATURATION: 96 % | SYSTOLIC BLOOD PRESSURE: 100 MMHG | HEIGHT: 69 IN | WEIGHT: 161 LBS | HEART RATE: 82 BPM | BODY MASS INDEX: 23.85 KG/M2 | RESPIRATION RATE: 18 BRPM

## 2024-12-17 DIAGNOSIS — H93.13 TINNITUS OF BOTH EARS: ICD-10-CM

## 2024-12-17 DIAGNOSIS — I10 ESSENTIAL (PRIMARY) HYPERTENSION: Primary | ICD-10-CM

## 2024-12-17 DIAGNOSIS — E78.2 MIXED HYPERLIPIDEMIA: ICD-10-CM

## 2024-12-17 DIAGNOSIS — H61.22 IMPACTED CERUMEN OF LEFT EAR: ICD-10-CM

## 2024-12-17 DIAGNOSIS — G30.9 ALZHEIMER'S DISEASE, UNSPECIFIED (CODE) (HCC): ICD-10-CM

## 2024-12-17 DIAGNOSIS — R73.03 PREDIABETES: ICD-10-CM

## 2024-12-17 PROCEDURE — 1159F MED LIST DOCD IN RCRD: CPT | Performed by: STUDENT IN AN ORGANIZED HEALTH CARE EDUCATION/TRAINING PROGRAM

## 2024-12-17 PROCEDURE — 1123F ACP DISCUSS/DSCN MKR DOCD: CPT | Performed by: STUDENT IN AN ORGANIZED HEALTH CARE EDUCATION/TRAINING PROGRAM

## 2024-12-17 PROCEDURE — 1160F RVW MEDS BY RX/DR IN RCRD: CPT | Performed by: STUDENT IN AN ORGANIZED HEALTH CARE EDUCATION/TRAINING PROGRAM

## 2024-12-17 PROCEDURE — 3078F DIAST BP <80 MM HG: CPT | Performed by: STUDENT IN AN ORGANIZED HEALTH CARE EDUCATION/TRAINING PROGRAM

## 2024-12-17 PROCEDURE — G8420 CALC BMI NORM PARAMETERS: HCPCS | Performed by: STUDENT IN AN ORGANIZED HEALTH CARE EDUCATION/TRAINING PROGRAM

## 2024-12-17 PROCEDURE — G8484 FLU IMMUNIZE NO ADMIN: HCPCS | Performed by: STUDENT IN AN ORGANIZED HEALTH CARE EDUCATION/TRAINING PROGRAM

## 2024-12-17 PROCEDURE — G8427 DOCREV CUR MEDS BY ELIG CLIN: HCPCS | Performed by: STUDENT IN AN ORGANIZED HEALTH CARE EDUCATION/TRAINING PROGRAM

## 2024-12-17 PROCEDURE — 3074F SYST BP LT 130 MM HG: CPT | Performed by: STUDENT IN AN ORGANIZED HEALTH CARE EDUCATION/TRAINING PROGRAM

## 2024-12-17 PROCEDURE — 1126F AMNT PAIN NOTED NONE PRSNT: CPT | Performed by: STUDENT IN AN ORGANIZED HEALTH CARE EDUCATION/TRAINING PROGRAM

## 2024-12-17 PROCEDURE — 99214 OFFICE O/P EST MOD 30 MIN: CPT | Performed by: STUDENT IN AN ORGANIZED HEALTH CARE EDUCATION/TRAINING PROGRAM

## 2024-12-17 PROCEDURE — 1036F TOBACCO NON-USER: CPT | Performed by: STUDENT IN AN ORGANIZED HEALTH CARE EDUCATION/TRAINING PROGRAM

## 2024-12-17 ASSESSMENT — ENCOUNTER SYMPTOMS
RHINORRHEA: 0
COUGH: 0
SHORTNESS OF BREATH: 0
NAUSEA: 0
VOMITING: 0
ABDOMINAL PAIN: 0

## 2024-12-17 NOTE — PROGRESS NOTES
Room:  I have reviewed all needed documentation in preparation for visit. Verified patient by name and date of birth  Chief Complaint   Patient presents with    Follow-up     6 month BP and medication       Vitals:    12/17/24 1003 12/17/24 1020   BP: (!) 89/60 (!) 80/58   Pulse: 82    Resp: 18    Temp: 97.3 °F (36.3 °C)    TempSrc: Temporal    SpO2: 96%    Weight: 73 kg (161 lb)    Height: 1.753 m (5' 9\")         Health Maintenance Due   Topic Date Due    DTaP/Tdap/Td vaccine (1 - Tdap) Never done    Shingles vaccine (1 of 2) Never done    Pneumococcal 65+ years Vaccine (2 of 2 - PCV) 09/26/2014    Respiratory Syncytial Virus (RSV) Pregnant or age 60 yrs+ (1 - 1-dose 75+ series) Never done    Flu vaccine (1) 08/01/2024    COVID-19 Vaccine (4 - 2023-24 season) 09/01/2024        1. \"Have you been to the ER, urgent care clinic since your last visit?  Hospitalized since your last visit?\" No    2. \"Have you seen or consulted any other health care providers outside of the [facility] since your last visit?\" No     
      Subjective:      Dev Cha is a 79 y.o. male who presents for had concerns including Follow-up (6 month BP and medication).     Current Outpatient Medications   Medication Sig Dispense Refill    calcium carbonate (TUMS) 500 MG chewable tablet Take 1 tablet by mouth daily       No current facility-administered medications for this visit.       No Known Allergies  Past Medical History:   Diagnosis Date    Arthritis     cervical spine    Calculus of kidney     Charcot-Mariangel-Tooth disease     type 2    GERD (gastroesophageal reflux disease)     Hypercholesterolemia     Memory change     Peripheral sensory-motor axonal polyneuropathy     Skin cancer     SCC on chest    Sun-damaged skin     Sunburn, blistering     Tinnitus       Past Surgical History:   Procedure Laterality Date    ORTHOPEDIC SURGERY      R grt toe surgery      Family History   Problem Relation Age of Onset    Hypertension Neg Hx     Cancer Neg Hx     Diabetes Other     Alzheimer's Disease Maternal Grandmother     Diabetes Maternal Uncle     Dementia Mother     Heart Disease Mother       Social History     Socioeconomic History    Marital status:      Spouse name: Not on file    Number of children: Not on file    Years of education: Not on file    Highest education level: Not on file   Occupational History    Not on file   Tobacco Use    Smoking status: Former     Current packs/day: 0.00     Types: Cigarettes     Quit date: 1997     Years since quittin.1    Smokeless tobacco: Never   Vaping Use    Vaping status: Never Used   Substance and Sexual Activity    Alcohol use: Yes     Alcohol/week: 7.0 standard drinks of alcohol    Drug use: No    Sexual activity: Not on file   Other Topics Concern    Not on file   Social History Narrative    Not on file     Social Determinants of Health     Financial Resource Strain: Low Risk  (2023)    Overall Financial Resource Strain (CARDIA)     Difficulty of Paying Living Expenses: Not

## 2025-01-29 DIAGNOSIS — I10 ESSENTIAL (PRIMARY) HYPERTENSION: ICD-10-CM

## 2025-01-29 DIAGNOSIS — R73.03 PREDIABETES: ICD-10-CM

## 2025-01-29 DIAGNOSIS — E78.2 MIXED HYPERLIPIDEMIA: ICD-10-CM

## 2025-01-29 LAB
ALBUMIN SERPL-MCNC: 3.4 G/DL (ref 3.5–5)
ALBUMIN/GLOB SERPL: 1.1 (ref 1.1–2.2)
ALP SERPL-CCNC: 56 U/L (ref 45–117)
ALT SERPL-CCNC: 16 U/L (ref 12–78)
ANION GAP SERPL CALC-SCNC: 5 MMOL/L (ref 2–12)
AST SERPL-CCNC: 17 U/L (ref 15–37)
BASOPHILS # BLD: 0.04 K/UL (ref 0–0.1)
BASOPHILS NFR BLD: 0.8 % (ref 0–1)
BILIRUB SERPL-MCNC: 0.5 MG/DL (ref 0.2–1)
BUN SERPL-MCNC: 17 MG/DL (ref 6–20)
BUN/CREAT SERPL: 22 (ref 12–20)
CALCIUM SERPL-MCNC: 9.6 MG/DL (ref 8.5–10.1)
CHLORIDE SERPL-SCNC: 105 MMOL/L (ref 97–108)
CHOLEST SERPL-MCNC: 237 MG/DL
CO2 SERPL-SCNC: 26 MMOL/L (ref 21–32)
CREAT SERPL-MCNC: 0.77 MG/DL (ref 0.7–1.3)
DIFFERENTIAL METHOD BLD: NORMAL
EOSINOPHIL # BLD: 0.12 K/UL (ref 0–0.4)
EOSINOPHIL NFR BLD: 2.5 % (ref 0–7)
ERYTHROCYTE [DISTWIDTH] IN BLOOD BY AUTOMATED COUNT: 12.8 % (ref 11.5–14.5)
EST. AVERAGE GLUCOSE BLD GHB EST-MCNC: 114 MG/DL
GLOBULIN SER CALC-MCNC: 3 G/DL (ref 2–4)
GLUCOSE SERPL-MCNC: 90 MG/DL (ref 65–100)
HBA1C MFR BLD: 5.6 % (ref 4–5.6)
HCT VFR BLD AUTO: 43.2 % (ref 36.6–50.3)
HDLC SERPL-MCNC: 64 MG/DL
HDLC SERPL: 3.7 (ref 0–5)
HGB BLD-MCNC: 14.3 G/DL (ref 12.1–17)
IMM GRANULOCYTES # BLD AUTO: 0.02 K/UL (ref 0–0.04)
IMM GRANULOCYTES NFR BLD AUTO: 0.4 % (ref 0–0.5)
LDLC SERPL CALC-MCNC: 153 MG/DL (ref 0–100)
LYMPHOCYTES # BLD: 1.65 K/UL (ref 0.8–3.5)
LYMPHOCYTES NFR BLD: 33.7 % (ref 12–49)
MCH RBC QN AUTO: 30.6 PG (ref 26–34)
MCHC RBC AUTO-ENTMCNC: 33.1 G/DL (ref 30–36.5)
MCV RBC AUTO: 92.3 FL (ref 80–99)
MONOCYTES # BLD: 0.49 K/UL (ref 0–1)
MONOCYTES NFR BLD: 10 % (ref 5–13)
NEUTS SEG # BLD: 2.57 K/UL (ref 1.8–8)
NEUTS SEG NFR BLD: 52.6 % (ref 32–75)
NRBC # BLD: 0 K/UL (ref 0–0.01)
NRBC BLD-RTO: 0 PER 100 WBC
PLATELET # BLD AUTO: 277 K/UL (ref 150–400)
PMV BLD AUTO: 10.5 FL (ref 8.9–12.9)
POTASSIUM SERPL-SCNC: 4.4 MMOL/L (ref 3.5–5.1)
PROT SERPL-MCNC: 6.4 G/DL (ref 6.4–8.2)
RBC # BLD AUTO: 4.68 M/UL (ref 4.1–5.7)
SODIUM SERPL-SCNC: 136 MMOL/L (ref 136–145)
TRIGL SERPL-MCNC: 100 MG/DL
TSH SERPL DL<=0.05 MIU/L-ACNC: 0.82 UIU/ML (ref 0.36–3.74)
VLDLC SERPL CALC-MCNC: 20 MG/DL
WBC # BLD AUTO: 4.9 K/UL (ref 4.1–11.1)

## 2025-03-12 ENCOUNTER — OFFICE VISIT (OUTPATIENT)
Age: 80
End: 2025-03-12
Payer: MEDICARE

## 2025-03-12 VITALS
RESPIRATION RATE: 16 BRPM | WEIGHT: 165 LBS | DIASTOLIC BLOOD PRESSURE: 60 MMHG | BODY MASS INDEX: 24.44 KG/M2 | HEART RATE: 63 BPM | OXYGEN SATURATION: 97 % | SYSTOLIC BLOOD PRESSURE: 105 MMHG | HEIGHT: 69 IN

## 2025-03-12 DIAGNOSIS — F02.B4 MODERATE LATE ONSET ALZHEIMER'S DEMENTIA WITH ANXIETY (HCC): Primary | ICD-10-CM

## 2025-03-12 DIAGNOSIS — G30.1 MODERATE LATE ONSET ALZHEIMER'S DEMENTIA WITH ANXIETY (HCC): Primary | ICD-10-CM

## 2025-03-12 PROCEDURE — 1126F AMNT PAIN NOTED NONE PRSNT: CPT | Performed by: PSYCHIATRY & NEUROLOGY

## 2025-03-12 PROCEDURE — 99214 OFFICE O/P EST MOD 30 MIN: CPT | Performed by: PSYCHIATRY & NEUROLOGY

## 2025-03-12 PROCEDURE — 1036F TOBACCO NON-USER: CPT | Performed by: PSYCHIATRY & NEUROLOGY

## 2025-03-12 PROCEDURE — 3074F SYST BP LT 130 MM HG: CPT | Performed by: PSYCHIATRY & NEUROLOGY

## 2025-03-12 PROCEDURE — 3078F DIAST BP <80 MM HG: CPT | Performed by: PSYCHIATRY & NEUROLOGY

## 2025-03-12 PROCEDURE — G8427 DOCREV CUR MEDS BY ELIG CLIN: HCPCS | Performed by: PSYCHIATRY & NEUROLOGY

## 2025-03-12 PROCEDURE — G8420 CALC BMI NORM PARAMETERS: HCPCS | Performed by: PSYCHIATRY & NEUROLOGY

## 2025-03-12 PROCEDURE — 1159F MED LIST DOCD IN RCRD: CPT | Performed by: PSYCHIATRY & NEUROLOGY

## 2025-03-12 PROCEDURE — 1123F ACP DISCUSS/DSCN MKR DOCD: CPT | Performed by: PSYCHIATRY & NEUROLOGY

## 2025-03-12 NOTE — PROGRESS NOTES
[facility] Neurology Clinics and Neurodiagnostic Center at [facility] Neurology Clinics at [address] [place] [address] [address] [facility] [address] [place] [address] [phone]              Chief Complaint   Patient presents with    AD     Presents w/ wife for 6 mo AD f/up // maintained off meds/ purple packet given last visit      Current Outpatient Medications   Medication Sig Dispense Refill    calcium carbonate (TUMS) 500 MG chewable tablet Take 1 tablet by mouth daily       No current facility-administered medications for this visit.      No Known Allergies  Social History     Tobacco Use    Smoking status: Former     Current packs/day: 0.00     Types: Cigarettes     Quit date: 1997     Years since quittin.3    Smokeless tobacco: Never   Vaping Use    Vaping status: Never Used   Substance Use Topics    Alcohol use: Yes     Alcohol/week: 7.0 standard drinks of alcohol    Drug use: No       History of Present Illness  79-year-old gentleman following up today for progressive Alzheimer's type dementia.  He has been off memory modifying medications as he would not take them and at his last visit I thought they had no real utility at this point in his disease.  He has continued to decline.  [facility]  His wife notes that he does go on walks in the neighborhood and the neighbors watch out for him.  We discussed Project life saver and she will reach out to the local Police Department to get the tracker.    Previous diagnostics  MRI of the brain 2021 disproportionate atrophy of the bilateral hippocampi mesial temporal lobes  Carotid Doppler unremarkable  EEG 2022 normal     Neurocognitive evaluation with Dr. Mcnally 2022 found moderate dementia with behavioral change in the pattern fit with that of an Alzheimer's type dementia.      Laboratory analysis  2025  CBC

## 2025-06-02 ENCOUNTER — OFFICE VISIT (OUTPATIENT)
Age: 80
End: 2025-06-02
Payer: MEDICARE

## 2025-06-02 VITALS
HEIGHT: 69 IN | HEART RATE: 63 BPM | DIASTOLIC BLOOD PRESSURE: 76 MMHG | RESPIRATION RATE: 18 BRPM | SYSTOLIC BLOOD PRESSURE: 102 MMHG | OXYGEN SATURATION: 96 % | BODY MASS INDEX: 24.37 KG/M2

## 2025-06-02 DIAGNOSIS — H93.13 TINNITUS OF BOTH EARS: Primary | ICD-10-CM

## 2025-06-02 DIAGNOSIS — H91.93 BILATERAL HEARING LOSS, UNSPECIFIED HEARING LOSS TYPE: ICD-10-CM

## 2025-06-02 PROCEDURE — 1159F MED LIST DOCD IN RCRD: CPT | Performed by: STUDENT IN AN ORGANIZED HEALTH CARE EDUCATION/TRAINING PROGRAM

## 2025-06-02 PROCEDURE — 1036F TOBACCO NON-USER: CPT | Performed by: STUDENT IN AN ORGANIZED HEALTH CARE EDUCATION/TRAINING PROGRAM

## 2025-06-02 PROCEDURE — G8420 CALC BMI NORM PARAMETERS: HCPCS | Performed by: STUDENT IN AN ORGANIZED HEALTH CARE EDUCATION/TRAINING PROGRAM

## 2025-06-02 PROCEDURE — 1123F ACP DISCUSS/DSCN MKR DOCD: CPT | Performed by: STUDENT IN AN ORGANIZED HEALTH CARE EDUCATION/TRAINING PROGRAM

## 2025-06-02 PROCEDURE — 3078F DIAST BP <80 MM HG: CPT | Performed by: STUDENT IN AN ORGANIZED HEALTH CARE EDUCATION/TRAINING PROGRAM

## 2025-06-02 PROCEDURE — 99203 OFFICE O/P NEW LOW 30 MIN: CPT | Performed by: STUDENT IN AN ORGANIZED HEALTH CARE EDUCATION/TRAINING PROGRAM

## 2025-06-02 PROCEDURE — G8427 DOCREV CUR MEDS BY ELIG CLIN: HCPCS | Performed by: STUDENT IN AN ORGANIZED HEALTH CARE EDUCATION/TRAINING PROGRAM

## 2025-06-02 PROCEDURE — 3074F SYST BP LT 130 MM HG: CPT | Performed by: STUDENT IN AN ORGANIZED HEALTH CARE EDUCATION/TRAINING PROGRAM
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
## 2025-06-16 ENCOUNTER — TELEPHONE (OUTPATIENT)
Facility: CLINIC | Age: 80
End: 2025-06-16

## 2025-06-16 SDOH — ECONOMIC STABILITY: FOOD INSECURITY: WITHIN THE PAST 12 MONTHS, THE FOOD YOU BOUGHT JUST DIDN'T LAST AND YOU DIDN'T HAVE MONEY TO GET MORE.: NEVER TRUE

## 2025-06-16 SDOH — ECONOMIC STABILITY: FOOD INSECURITY: WITHIN THE PAST 12 MONTHS, YOU WORRIED THAT YOUR FOOD WOULD RUN OUT BEFORE YOU GOT MONEY TO BUY MORE.: NEVER TRUE

## 2025-06-16 SDOH — HEALTH STABILITY: PHYSICAL HEALTH: ON AVERAGE, HOW MANY MINUTES DO YOU ENGAGE IN EXERCISE AT THIS LEVEL?: 20 MIN

## 2025-06-16 SDOH — ECONOMIC STABILITY: TRANSPORTATION INSECURITY
IN THE PAST 12 MONTHS, HAS LACK OF TRANSPORTATION KEPT YOU FROM MEETINGS, WORK, OR FROM GETTING THINGS NEEDED FOR DAILY LIVING?: NO

## 2025-06-16 SDOH — HEALTH STABILITY: PHYSICAL HEALTH: ON AVERAGE, HOW MANY DAYS PER WEEK DO YOU ENGAGE IN MODERATE TO STRENUOUS EXERCISE (LIKE A BRISK WALK)?: 7 DAYS

## 2025-06-16 SDOH — ECONOMIC STABILITY: INCOME INSECURITY: IN THE LAST 12 MONTHS, WAS THERE A TIME WHEN YOU WERE NOT ABLE TO PAY THE MORTGAGE OR RENT ON TIME?: NO

## 2025-06-16 SDOH — ECONOMIC STABILITY: TRANSPORTATION INSECURITY
IN THE PAST 12 MONTHS, HAS THE LACK OF TRANSPORTATION KEPT YOU FROM MEDICAL APPOINTMENTS OR FROM GETTING MEDICATIONS?: NO

## 2025-06-16 ASSESSMENT — PATIENT HEALTH QUESTIONNAIRE - PHQ9
SUM OF ALL RESPONSES TO PHQ QUESTIONS 1-9: 0
1. LITTLE INTEREST OR PLEASURE IN DOING THINGS: NOT AT ALL
SUM OF ALL RESPONSES TO PHQ QUESTIONS 1-9: 0
2. FEELING DOWN, DEPRESSED OR HOPELESS: NOT AT ALL

## 2025-06-16 ASSESSMENT — LIFESTYLE VARIABLES
HOW MANY STANDARD DRINKS CONTAINING ALCOHOL DO YOU HAVE ON A TYPICAL DAY: 0
HOW OFTEN DO YOU HAVE SIX OR MORE DRINKS ON ONE OCCASION: 1
HOW OFTEN DO YOU HAVE A DRINK CONTAINING ALCOHOL: NEVER
HOW OFTEN DO YOU HAVE A DRINK CONTAINING ALCOHOL: 1
HOW MANY STANDARD DRINKS CONTAINING ALCOHOL DO YOU HAVE ON A TYPICAL DAY: PATIENT DOES NOT DRINK

## 2025-06-17 ENCOUNTER — OFFICE VISIT (OUTPATIENT)
Facility: CLINIC | Age: 80
End: 2025-06-17
Payer: MEDICARE

## 2025-06-17 VITALS
TEMPERATURE: 97.2 F | BODY MASS INDEX: 25.62 KG/M2 | HEART RATE: 58 BPM | OXYGEN SATURATION: 98 % | DIASTOLIC BLOOD PRESSURE: 72 MMHG | WEIGHT: 173 LBS | SYSTOLIC BLOOD PRESSURE: 113 MMHG | HEIGHT: 69 IN | RESPIRATION RATE: 17 BRPM

## 2025-06-17 DIAGNOSIS — I10 ESSENTIAL (PRIMARY) HYPERTENSION: ICD-10-CM

## 2025-06-17 DIAGNOSIS — F41.9 ANXIETY: ICD-10-CM

## 2025-06-17 DIAGNOSIS — G30.9 ALZHEIMER'S DISEASE, UNSPECIFIED (CODE) (HCC): ICD-10-CM

## 2025-06-17 DIAGNOSIS — E78.2 MIXED HYPERLIPIDEMIA: ICD-10-CM

## 2025-06-17 DIAGNOSIS — Z00.00 MEDICARE ANNUAL WELLNESS VISIT, SUBSEQUENT: Primary | ICD-10-CM

## 2025-06-17 PROCEDURE — 1159F MED LIST DOCD IN RCRD: CPT | Performed by: STUDENT IN AN ORGANIZED HEALTH CARE EDUCATION/TRAINING PROGRAM

## 2025-06-17 PROCEDURE — G0439 PPPS, SUBSEQ VISIT: HCPCS | Performed by: STUDENT IN AN ORGANIZED HEALTH CARE EDUCATION/TRAINING PROGRAM

## 2025-06-17 PROCEDURE — 3078F DIAST BP <80 MM HG: CPT | Performed by: STUDENT IN AN ORGANIZED HEALTH CARE EDUCATION/TRAINING PROGRAM

## 2025-06-17 PROCEDURE — 1036F TOBACCO NON-USER: CPT | Performed by: STUDENT IN AN ORGANIZED HEALTH CARE EDUCATION/TRAINING PROGRAM

## 2025-06-17 PROCEDURE — 99213 OFFICE O/P EST LOW 20 MIN: CPT | Performed by: STUDENT IN AN ORGANIZED HEALTH CARE EDUCATION/TRAINING PROGRAM

## 2025-06-17 PROCEDURE — 3074F SYST BP LT 130 MM HG: CPT | Performed by: STUDENT IN AN ORGANIZED HEALTH CARE EDUCATION/TRAINING PROGRAM

## 2025-06-17 PROCEDURE — G8427 DOCREV CUR MEDS BY ELIG CLIN: HCPCS | Performed by: STUDENT IN AN ORGANIZED HEALTH CARE EDUCATION/TRAINING PROGRAM

## 2025-06-17 PROCEDURE — 1123F ACP DISCUSS/DSCN MKR DOCD: CPT | Performed by: STUDENT IN AN ORGANIZED HEALTH CARE EDUCATION/TRAINING PROGRAM

## 2025-06-17 PROCEDURE — G8419 CALC BMI OUT NRM PARAM NOF/U: HCPCS | Performed by: STUDENT IN AN ORGANIZED HEALTH CARE EDUCATION/TRAINING PROGRAM

## 2025-06-17 SDOH — ECONOMIC STABILITY: FOOD INSECURITY: WITHIN THE PAST 12 MONTHS, YOU WORRIED THAT YOUR FOOD WOULD RUN OUT BEFORE YOU GOT MONEY TO BUY MORE.: NEVER TRUE

## 2025-06-17 SDOH — ECONOMIC STABILITY: FOOD INSECURITY: WITHIN THE PAST 12 MONTHS, THE FOOD YOU BOUGHT JUST DIDN'T LAST AND YOU DIDN'T HAVE MONEY TO GET MORE.: NEVER TRUE

## 2025-06-17 ASSESSMENT — ENCOUNTER SYMPTOMS
SHORTNESS OF BREATH: 0
VOMITING: 0
ABDOMINAL PAIN: 0
NAUSEA: 0
COUGH: 0
RHINORRHEA: 0

## 2025-06-17 NOTE — PATIENT INSTRUCTIONS
healthcare professional. [employer], [facility], disclaims any warranty or liability for your use of this information.    Personalized Preventive Plan for Dev Cha - 6/17/2025  Medicare offers a range of preventive health benefits. Some of the tests and screenings are paid in full while other may be subject to a deductible, co-insurance, and/or copay.  Some of these benefits include a comprehensive review of your medical history including lifestyle, illnesses that may run in your family, and various assessments and screenings as appropriate.  After reviewing your medical record and screening and assessments performed today your provider may have ordered immunizations, labs, imaging, and/or referrals for you.  A list of these orders (if applicable) as well as your Preventive Care list are included within your After Visit Summary for your review.

## 2025-06-17 NOTE — PROGRESS NOTES
Assessment/Plan:     Diagnoses and all orders for this visit:    Medicare annual wellness visit, subsequent  -Annual Medicare wellness questionnaire reviewed  -Patient declines immunizations at this time  -Screening recommendations reviewed    Essential (primary) hypertension  -Chronic.  Stable.  -Patient remains off of medication at this time    Mixed hyperlipidemia  -Chronic.  Has noted some elevation on last lab results  -Patient declines medication management  -Will monitor at next routine follow-up    Alzheimer's disease, unspecified (CODE) (HCC)  -Patient has a known history of Alzheimer's disease that is progressively worsening  -Due to his underlying Alzheimer's disease his appetite and eating habits have been changing  -He refuses to take any medication management  -He continues to follow with neurology     Anxiety  -Intermittent.  Patient gets very anxious when in the car for prolonged periods  -Therefore can refill low-dose hydroxyzine as needed during car ride  -Discussed medication risks and side effects including drowsiness and increased fall risk.  Patient and wife state understanding    Please note that this dictation was completed with [employer], the computer voice recognition software. Quite often unanticipated grammatical, syntax, homophones, and other interpretive errors are inadvertently transcribed by the computer software. Please disregard these errors. Please excuse any errors that have escaped final proofreading.      Return in about 6 months (around 12/17/2025).     Discussed expected course/resolution/complications of diagnosis in detail with patient.    Medication risks/benefits/costs/interactions/alternatives discussed with patient.    Pt expressed understanding with the diagnosis and plan      Subjective:      Dev Cha is a 80 y.o. male who presents for had concerns including Medicare AWV (Patient here for a Medicare Wellness visit.).     Current Outpatient Medications   Medication

## 2025-06-17 NOTE — PROGRESS NOTES
dentified pt with two pt identifiers(name and ).    Chief Complaint   Patient presents with    Medicare AWV     Patient here for a Medicare Wellness visit.        Health Maintenance Due   Topic    Pneumococcal 50+ years Vaccine (2 of 2 - PCV)    Respiratory Syncytial Virus (RSV) Pregnant or age 60 yrs+ (1 - 1-dose 75+ series)    Shingles vaccine (2 of 2)    COVID-19 Vaccine ( season)    Annual Wellness Visit (Medicare Advantage)        Wt Readings from Last 3 Encounters:   25 78.5 kg (173 lb)   25 74.8 kg (165 lb)   24 73 kg (161 lb)     Temp Readings from Last 3 Encounters:   25 97.2 °F (36.2 °C) (Temporal)   24 97.3 °F (36.3 °C) (Temporal)   24 96.9 °F (36.1 °C)     BP Readings from Last 3 Encounters:   25 113/72   25 102/76   25 105/60     Pulse Readings from Last 3 Encounters:   25 58   25 63   25 63           Coordination of Care Questionnaire:  :   1. \"Have you been to the ER, urgent care clinic since your last visit?  Hospitalized since your last visit?\" no    2. \"Have you seen or consulted any other health care providers outside of the [facility] since your last visit?\" no     3. For patients aged 45-75: Has the patient had a colonoscopy / FIT/ Cologuard? no      If the patient is female:    4. For patients aged 40-74: Has the patient had a mammogram within the past 2 years? no      5. For patients aged 21-65: Has the patient had a pap smear? no     3) Do you have an Advance Directive on file? Yes  Are you interested in receiving information about Advance Directives? no    Patient is accompanied by Wife I have received verbal consent from Dev Cha to discuss any/all medical information while they are present in the room.

## 2025-06-18 RX ORDER — HYDROXYZINE HYDROCHLORIDE 10 MG/1
10 TABLET, FILM COATED ORAL EVERY 8 HOURS PRN
Qty: 30 TABLET | Refills: 0 | Status: SHIPPED | OUTPATIENT
Start: 2025-06-18 | End: 2025-06-28

## 2025-06-19 NOTE — PROGRESS NOTES
Chief Complaint   Patient presents with    Memory Loss     Follow up - patient and wife states memory is about the same, it has gotten any better or any worse.          Visit Vitals  /76   Pulse 68   Resp 16   Ht 5' 9\" (1.753 m)   Wt 86.2 kg (190 lb)   SpO2 96%   BMI 28.06 kg/m² Admission

## (undated) DEVICE — FORCEPS BX L240CM JAW DIA2.8MM L CAP W/ NDL MIC MESH TOOTH

## (undated) DEVICE — SYR 3ML LL TIP 1/10ML GRAD --

## (undated) DEVICE — SOLIDIFIER MEDC 1200ML -- CONVERT TO 356117

## (undated) DEVICE — SET ADMIN 16ML TBNG L100IN 2 Y INJ SITE IV PIGGY BK DISP

## (undated) DEVICE — BAG BELONG PT PERS CLEAR HANDL

## (undated) DEVICE — KIT COLON W/ 1.1OZ LUB AND 2 END

## (undated) DEVICE — BAG SPEC BIOHZRD 10 X 10 IN --

## (undated) DEVICE — 1200 GUARD II KIT W/5MM TUBE W/O VAC TUBE: Brand: GUARDIAN

## (undated) DEVICE — Device

## (undated) DEVICE — CANN NASAL O2 CAPNOGRAPHY AD -- FILTERLINE

## (undated) DEVICE — BITEBLOCK ENDOSCP 60FR MAXI WHT POLYETH STURDY W/ VELC WVN

## (undated) DEVICE — CATH IV AUTOGRD BC BLU 22GA 25 -- INSYTE

## (undated) DEVICE — ELECTRODE,RADIOTRANSLUCENT,FOAM,3PK: Brand: MEDLINE

## (undated) DEVICE — CONTAINER SPEC 20 ML LID NEUT BUFF FORMALIN 10 % POLYPR STS

## (undated) DEVICE — BASIN EMSIS 16OZ GRAPHITE PLAS KID SHP MOLD GRAD FOR ORAL